# Patient Record
Sex: FEMALE | Race: WHITE | NOT HISPANIC OR LATINO | URBAN - METROPOLITAN AREA
[De-identification: names, ages, dates, MRNs, and addresses within clinical notes are randomized per-mention and may not be internally consistent; named-entity substitution may affect disease eponyms.]

---

## 2020-11-07 ENCOUNTER — INPATIENT (INPATIENT)
Facility: HOSPITAL | Age: 55
LOS: 3 days | Discharge: HOME HEALTH | DRG: 981 | End: 2020-11-11
Attending: STUDENT IN AN ORGANIZED HEALTH CARE EDUCATION/TRAINING PROGRAM | Admitting: INTERNAL MEDICINE
Payer: MEDICARE

## 2020-11-07 VITALS
SYSTOLIC BLOOD PRESSURE: 112 MMHG | DIASTOLIC BLOOD PRESSURE: 83 MMHG | OXYGEN SATURATION: 95 % | TEMPERATURE: 98 F | RESPIRATION RATE: 6 BRPM

## 2020-11-07 LAB
ALBUMIN SERPL ELPH-MCNC: 2.3 G/DL — LOW (ref 3.3–5)
ALBUMIN SERPL ELPH-MCNC: 3 G/DL — LOW (ref 3.3–5)
ALP SERPL-CCNC: 71 U/L — SIGNIFICANT CHANGE UP (ref 40–120)
ALP SERPL-CCNC: 81 U/L — SIGNIFICANT CHANGE UP (ref 40–120)
ALT FLD-CCNC: 10 U/L — SIGNIFICANT CHANGE UP (ref 10–45)
ALT FLD-CCNC: 9 U/L — LOW (ref 10–45)
ANION GAP SERPL CALC-SCNC: 14 MMOL/L — SIGNIFICANT CHANGE UP (ref 5–17)
ANION GAP SERPL CALC-SCNC: 16 MMOL/L — SIGNIFICANT CHANGE UP (ref 5–17)
APPEARANCE UR: CLEAR — SIGNIFICANT CHANGE UP
APTT BLD: 31 SEC — SIGNIFICANT CHANGE UP (ref 27.5–35.5)
AST SERPL-CCNC: 19 U/L — SIGNIFICANT CHANGE UP (ref 10–40)
AST SERPL-CCNC: 19 U/L — SIGNIFICANT CHANGE UP (ref 10–40)
BASE EXCESS BLDA CALC-SCNC: -0.4 MMOL/L — SIGNIFICANT CHANGE UP (ref -2–3)
BASE EXCESS BLDV CALC-SCNC: 4.8 MMOL/L — SIGNIFICANT CHANGE UP
BASOPHILS # BLD AUTO: 0.01 K/UL — SIGNIFICANT CHANGE UP (ref 0–0.2)
BASOPHILS NFR BLD AUTO: 0.1 % — SIGNIFICANT CHANGE UP (ref 0–2)
BILIRUB SERPL-MCNC: 0.3 MG/DL — SIGNIFICANT CHANGE UP (ref 0.2–1.2)
BILIRUB SERPL-MCNC: 0.3 MG/DL — SIGNIFICANT CHANGE UP (ref 0.2–1.2)
BILIRUB UR-MCNC: NEGATIVE — SIGNIFICANT CHANGE UP
BUN SERPL-MCNC: 20 MG/DL — SIGNIFICANT CHANGE UP (ref 7–23)
BUN SERPL-MCNC: 20 MG/DL — SIGNIFICANT CHANGE UP (ref 7–23)
CALCIUM SERPL-MCNC: 7 MG/DL — LOW (ref 8.4–10.5)
CALCIUM SERPL-MCNC: 7.9 MG/DL — LOW (ref 8.4–10.5)
CHLORIDE SERPL-SCNC: 101 MMOL/L — SIGNIFICANT CHANGE UP (ref 96–108)
CHLORIDE SERPL-SCNC: 105 MMOL/L — SIGNIFICANT CHANGE UP (ref 96–108)
CO2 SERPL-SCNC: 23 MMOL/L — SIGNIFICANT CHANGE UP (ref 22–31)
CO2 SERPL-SCNC: 26 MMOL/L — SIGNIFICANT CHANGE UP (ref 22–31)
COLOR SPEC: YELLOW — SIGNIFICANT CHANGE UP
CREAT SERPL-MCNC: 0.68 MG/DL — SIGNIFICANT CHANGE UP (ref 0.5–1.3)
CREAT SERPL-MCNC: 0.72 MG/DL — SIGNIFICANT CHANGE UP (ref 0.5–1.3)
CRP SERPL-MCNC: 2.86 MG/DL — HIGH (ref 0–0.4)
D DIMER BLD IA.RAPID-MCNC: 282 NG/ML DDU — HIGH
DIFF PNL FLD: ABNORMAL
EOSINOPHIL # BLD AUTO: 0 K/UL — SIGNIFICANT CHANGE UP (ref 0–0.5)
EOSINOPHIL NFR BLD AUTO: 0 % — SIGNIFICANT CHANGE UP (ref 0–6)
ETHANOL SERPL-MCNC: <10 MG/DL — SIGNIFICANT CHANGE UP (ref 0–10)
FERRITIN SERPL-MCNC: 33 NG/ML — SIGNIFICANT CHANGE UP (ref 15–150)
FIBRINOGEN PPP-MCNC: 353 MG/DL — SIGNIFICANT CHANGE UP (ref 258–438)
GAS PNL BLDA: SIGNIFICANT CHANGE UP
GAS PNL BLDV: SIGNIFICANT CHANGE UP
GLUCOSE SERPL-MCNC: 124 MG/DL — HIGH (ref 70–99)
GLUCOSE SERPL-MCNC: 127 MG/DL — HIGH (ref 70–99)
GLUCOSE UR QL: NEGATIVE — SIGNIFICANT CHANGE UP
HCO3 BLDA-SCNC: 26 MMOL/L — SIGNIFICANT CHANGE UP (ref 21–28)
HCO3 BLDV-SCNC: 30 MMOL/L — HIGH (ref 20–27)
HCT VFR BLD CALC: 44.5 % — SIGNIFICANT CHANGE UP (ref 34.5–45)
HGB BLD-MCNC: 14.3 G/DL — SIGNIFICANT CHANGE UP (ref 11.5–15.5)
IMM GRANULOCYTES NFR BLD AUTO: 0.5 % — SIGNIFICANT CHANGE UP (ref 0–1.5)
INR BLD: 1.24 — HIGH (ref 0.88–1.16)
KETONES UR-MCNC: NEGATIVE — SIGNIFICANT CHANGE UP
LACTATE SERPL-SCNC: 5.3 MMOL/L — CRITICAL HIGH (ref 0.5–2)
LDH SERPL L TO P-CCNC: 287 U/L — HIGH (ref 50–242)
LEUKOCYTE ESTERASE UR-ACNC: ABNORMAL
LYMPHOCYTES # BLD AUTO: 0.4 K/UL — LOW (ref 1–3.3)
LYMPHOCYTES # BLD AUTO: 4.9 % — LOW (ref 13–44)
MCHC RBC-ENTMCNC: 32.1 GM/DL — SIGNIFICANT CHANGE UP (ref 32–36)
MCHC RBC-ENTMCNC: 32.1 PG — SIGNIFICANT CHANGE UP (ref 27–34)
MCV RBC AUTO: 100 FL — SIGNIFICANT CHANGE UP (ref 80–100)
MONOCYTES # BLD AUTO: 0.22 K/UL — SIGNIFICANT CHANGE UP (ref 0–0.9)
MONOCYTES NFR BLD AUTO: 2.7 % — SIGNIFICANT CHANGE UP (ref 2–14)
NEUTROPHILS # BLD AUTO: 7.52 K/UL — HIGH (ref 1.8–7.4)
NEUTROPHILS NFR BLD AUTO: 91.8 % — HIGH (ref 43–77)
NITRITE UR-MCNC: NEGATIVE — SIGNIFICANT CHANGE UP
NRBC # BLD: 0 /100 WBCS — SIGNIFICANT CHANGE UP (ref 0–0)
PCO2 BLDA: 48 MMHG — HIGH (ref 32–45)
PCO2 BLDV: 44 MMHG — SIGNIFICANT CHANGE UP (ref 41–51)
PCP SPEC-MCNC: SIGNIFICANT CHANGE UP
PH BLDA: 7.35 — SIGNIFICANT CHANGE UP (ref 7.35–7.45)
PH BLDV: 7.45 — HIGH (ref 7.32–7.43)
PH UR: 6.5 — SIGNIFICANT CHANGE UP (ref 5–8)
PLATELET # BLD AUTO: 212 K/UL — SIGNIFICANT CHANGE UP (ref 150–400)
PO2 BLDA: 236 MMHG — HIGH (ref 83–108)
PO2 BLDV: 47 MMHG — SIGNIFICANT CHANGE UP
POTASSIUM SERPL-MCNC: 4.2 MMOL/L — SIGNIFICANT CHANGE UP (ref 3.5–5.3)
POTASSIUM SERPL-MCNC: 4.4 MMOL/L — SIGNIFICANT CHANGE UP (ref 3.5–5.3)
POTASSIUM SERPL-SCNC: 4.2 MMOL/L — SIGNIFICANT CHANGE UP (ref 3.5–5.3)
POTASSIUM SERPL-SCNC: 4.4 MMOL/L — SIGNIFICANT CHANGE UP (ref 3.5–5.3)
PROCALCITONIN SERPL-MCNC: 0.16 NG/ML — HIGH (ref 0.02–0.1)
PROT SERPL-MCNC: 5.1 G/DL — LOW (ref 6–8.3)
PROT SERPL-MCNC: 5.7 G/DL — LOW (ref 6–8.3)
PROT UR-MCNC: ABNORMAL MG/DL
PROTHROM AB SERPL-ACNC: 14.7 SEC — HIGH (ref 10.6–13.6)
RBC # BLD: 4.45 M/UL — SIGNIFICANT CHANGE UP (ref 3.8–5.2)
RBC # FLD: 12.8 % — SIGNIFICANT CHANGE UP (ref 10.3–14.5)
SAO2 % BLDA: 100 % — SIGNIFICANT CHANGE UP (ref 95–100)
SAO2 % BLDV: 80 % — SIGNIFICANT CHANGE UP
SODIUM SERPL-SCNC: 140 MMOL/L — SIGNIFICANT CHANGE UP (ref 135–145)
SODIUM SERPL-SCNC: 145 MMOL/L — SIGNIFICANT CHANGE UP (ref 135–145)
SP GR SPEC: 1.01 — SIGNIFICANT CHANGE UP (ref 1–1.03)
TROPONIN T SERPL-MCNC: 0.02 NG/ML — HIGH (ref 0–0.01)
UROBILINOGEN FLD QL: 0.2 E.U./DL — SIGNIFICANT CHANGE UP
WBC # BLD: 8.19 K/UL — SIGNIFICANT CHANGE UP (ref 3.8–10.5)
WBC # FLD AUTO: 8.19 K/UL — SIGNIFICANT CHANGE UP (ref 3.8–10.5)

## 2020-11-07 PROCEDURE — 70450 CT HEAD/BRAIN W/O DYE: CPT | Mod: 26,59

## 2020-11-07 PROCEDURE — 99285 EMERGENCY DEPT VISIT HI MDM: CPT | Mod: CS,25

## 2020-11-07 PROCEDURE — 0042T: CPT

## 2020-11-07 PROCEDURE — 70498 CT ANGIOGRAPHY NECK: CPT | Mod: 26

## 2020-11-07 PROCEDURE — 93010 ELECTROCARDIOGRAM REPORT: CPT | Mod: XU

## 2020-11-07 PROCEDURE — 70496 CT ANGIOGRAPHY HEAD: CPT | Mod: 26

## 2020-11-07 PROCEDURE — 31500 INSERT EMERGENCY AIRWAY: CPT

## 2020-11-07 PROCEDURE — 99291 CRITICAL CARE FIRST HOUR: CPT

## 2020-11-07 PROCEDURE — 71045 X-RAY EXAM CHEST 1 VIEW: CPT | Mod: 26

## 2020-11-07 PROCEDURE — 74177 CT ABD & PELVIS W/CONTRAST: CPT | Mod: 26

## 2020-11-07 RX ORDER — CEFTRIAXONE 500 MG/1
1000 INJECTION, POWDER, FOR SOLUTION INTRAMUSCULAR; INTRAVENOUS ONCE
Refills: 0 | Status: COMPLETED | OUTPATIENT
Start: 2020-11-07 | End: 2020-11-07

## 2020-11-07 RX ORDER — PANTOPRAZOLE SODIUM 20 MG/1
40 TABLET, DELAYED RELEASE ORAL ONCE
Refills: 0 | Status: COMPLETED | OUTPATIENT
Start: 2020-11-07 | End: 2020-11-07

## 2020-11-07 RX ORDER — NALOXONE HYDROCHLORIDE 4 MG/.1ML
0.4 SPRAY NASAL ONCE
Refills: 0 | Status: COMPLETED | OUTPATIENT
Start: 2020-11-07 | End: 2020-11-07

## 2020-11-07 RX ORDER — SUCCINYLCHOLINE CHLORIDE 100 MG/5ML
125 SYRINGE (ML) INTRAVENOUS ONCE
Refills: 0 | Status: COMPLETED | OUTPATIENT
Start: 2020-11-07 | End: 2020-11-07

## 2020-11-07 RX ORDER — SODIUM BICARBONATE 1 MEQ/ML
100 SYRINGE (ML) INTRAVENOUS ONCE
Refills: 0 | Status: COMPLETED | OUTPATIENT
Start: 2020-11-07 | End: 2020-11-07

## 2020-11-07 RX ORDER — PROPOFOL 10 MG/ML
5 INJECTION, EMULSION INTRAVENOUS
Qty: 500 | Refills: 0 | Status: DISCONTINUED | OUTPATIENT
Start: 2020-11-07 | End: 2020-11-08

## 2020-11-07 RX ORDER — ETOMIDATE 2 MG/ML
20 INJECTION INTRAVENOUS ONCE
Refills: 0 | Status: COMPLETED | OUTPATIENT
Start: 2020-11-07 | End: 2020-11-07

## 2020-11-07 RX ORDER — SODIUM CHLORIDE 9 MG/ML
1000 INJECTION INTRAMUSCULAR; INTRAVENOUS; SUBCUTANEOUS
Refills: 0 | Status: DISCONTINUED | OUTPATIENT
Start: 2020-11-07 | End: 2020-11-08

## 2020-11-07 RX ORDER — METRONIDAZOLE 500 MG
500 TABLET ORAL ONCE
Refills: 0 | Status: COMPLETED | OUTPATIENT
Start: 2020-11-07 | End: 2020-11-07

## 2020-11-07 RX ORDER — SODIUM CHLORIDE 9 MG/ML
1700 INJECTION, SOLUTION INTRAVENOUS ONCE
Refills: 0 | Status: DISCONTINUED | OUTPATIENT
Start: 2020-11-07 | End: 2020-11-08

## 2020-11-07 RX ORDER — SODIUM CHLORIDE 9 MG/ML
1000 INJECTION INTRAMUSCULAR; INTRAVENOUS; SUBCUTANEOUS ONCE
Refills: 0 | Status: COMPLETED | OUTPATIENT
Start: 2020-11-07 | End: 2020-11-07

## 2020-11-07 RX ORDER — IPRATROPIUM/ALBUTEROL SULFATE 18-103MCG
3 AEROSOL WITH ADAPTER (GRAM) INHALATION ONCE
Refills: 0 | Status: COMPLETED | OUTPATIENT
Start: 2020-11-07 | End: 2020-11-07

## 2020-11-07 RX ADMIN — Medication 125 MILLIGRAM(S): at 19:12

## 2020-11-07 RX ADMIN — SODIUM CHLORIDE 1000 MILLILITER(S): 9 INJECTION INTRAMUSCULAR; INTRAVENOUS; SUBCUTANEOUS at 21:36

## 2020-11-07 RX ADMIN — Medication 125 MILLIGRAM(S): at 19:15

## 2020-11-07 RX ADMIN — Medication 500 MILLIGRAM(S): at 22:20

## 2020-11-07 RX ADMIN — ETOMIDATE 20 MILLIGRAM(S): 2 INJECTION INTRAVENOUS at 19:12

## 2020-11-07 RX ADMIN — SODIUM CHLORIDE 1000 MILLILITER(S): 9 INJECTION INTRAMUSCULAR; INTRAVENOUS; SUBCUTANEOUS at 21:35

## 2020-11-07 RX ADMIN — Medication 3 MILLILITER(S): at 21:35

## 2020-11-07 RX ADMIN — Medication 100 MILLIGRAM(S): at 20:47

## 2020-11-07 RX ADMIN — SODIUM CHLORIDE 1000 MILLILITER(S): 9 INJECTION INTRAMUSCULAR; INTRAVENOUS; SUBCUTANEOUS at 20:38

## 2020-11-07 RX ADMIN — CEFTRIAXONE 100 MILLIGRAM(S): 500 INJECTION, POWDER, FOR SOLUTION INTRAMUSCULAR; INTRAVENOUS at 20:36

## 2020-11-07 RX ADMIN — SODIUM CHLORIDE 1000 MILLILITER(S): 9 INJECTION INTRAMUSCULAR; INTRAVENOUS; SUBCUTANEOUS at 23:06

## 2020-11-07 RX ADMIN — NALOXONE HYDROCHLORIDE 0.4 MILLIGRAM(S): 4 SPRAY NASAL at 19:09

## 2020-11-07 RX ADMIN — PROPOFOL 2.1 MICROGRAM(S)/KG/MIN: 10 INJECTION, EMULSION INTRAVENOUS at 20:37

## 2020-11-07 RX ADMIN — CEFTRIAXONE 1000 MILLIGRAM(S): 500 INJECTION, POWDER, FOR SOLUTION INTRAMUSCULAR; INTRAVENOUS at 20:48

## 2020-11-07 RX ADMIN — PANTOPRAZOLE SODIUM 40 MILLIGRAM(S): 20 TABLET, DELAYED RELEASE ORAL at 20:47

## 2020-11-07 RX ADMIN — SODIUM CHLORIDE 1000 MILLILITER(S): 9 INJECTION INTRAMUSCULAR; INTRAVENOUS; SUBCUTANEOUS at 22:20

## 2020-11-07 RX ADMIN — Medication 100 MILLIEQUIVALENT(S): at 19:09

## 2020-11-07 RX ADMIN — SODIUM CHLORIDE 1000 MILLILITER(S): 9 INJECTION INTRAMUSCULAR; INTRAVENOUS; SUBCUTANEOUS at 20:37

## 2020-11-07 NOTE — CONSULT NOTE ADULT - SUBJECTIVE AND OBJECTIVE BOX
**STROKE CODE CONSULT NOTE**    Last known well time/Time of onset of symptoms:    HPI: 55y Female with PMHx of       T(C): --  HR: --  BP: 112/83 (11-07-20 @ 19:09) (112/83 - 112/83)  RR: --  SpO2: 95% (11-07-20 @ 19:09) (95% - 95%)    PAST MEDICAL & SURGICAL HISTORY:      FAMILY HISTORY:      SOCIAL HISTORY:    ROS: ***  Constitutional: No fever, weight loss or fatigue  Eyes: No eye pain, visual disturbances, or discharge  ENMT:  No difficulty hearing, tinnitus, vertigo; No sinus or throat pain  Neck: No pain or stiffness  Respiratory: No cough, wheezing, chills or hemoptysis  Cardiovascular: No chest pain, palpitations, shortness of breath, dizziness or leg swelling  Gastrointestinal: No abdominal pain. No nausea, vomiting or hematemesis; No diarrhea or constipation. Nohematochezia.  Genitourinary: No dysuria, frequency, hematuria or incontinence  Neurological: As per HPI  Skin: No itching, burning, rashes or lesions   Endocrine: No heat or cold intolerance; No hair loss  Musculoskeletal: No joint pain or swelling; No muscle, back or extremity pain  Psychiatric: No depression, anxiety, mood swings or difficulty sleeping  Heme/Lymph: No easy bruising or bleeding gums    MEDICATIONS  (STANDING):    MEDICATIONS  (PRN):    Allergies    No Known Allergies    Intolerances      Vital Signs Last 24 Hrs  T(C): --  T(F): --  HR: --  BP: 112/83 (07 Nov 2020 19:09) (112/83 - 112/83)  BP(mean): --  RR: --  SpO2: 95% (07 Nov 2020 19:09) (95% - 95%)    Physical exam:  Constitutional: Respiratory distress  Eyes: Anicteric sclerae, moist conjunctivae, see below for CNs  Cardiovascular: Regular rate and rhythm  Pulmonary: Wheezing, using accessory muscles  GI: Abdomen soft non-distended    Neurologic:  -Mental status: Comatose, no sedation, does not open eyes to voice or noxious stimuli, does not follow commands or track, mute  -Cranial nerves:   II: Blink to threat intact   III, IV, VI: Oculocephalic reflex intact, disconjugate gaze. Pupils equally round and reactive to light 6mm to 3mm  VII: Face appears symmetric, being bagged  IX, X: Cough and gag absent  Motor/Sensation: Absent withdrawal bilateral arms and legs to noxious stimuli.   Reflexes: Mute toes bilaterally       NIHSS: 30    Fingerstick Blood Glucose: CAPILLARY BLOOD GLUCOSE  133 (07 Nov 2020 19:18)      POCT Blood Glucose.: 162 mg/dL (07 Nov 2020 19:04)    LABS:                    RADIOLOGY & ADDITIONAL STUDIES:      -----------------------------------------------------------------------------------------------------------------  IV-tPA (Y/N):    no  Reason IV-tPA not given: out of window   **STROKE CODE CONSULT NOTE**    Last known well time/Time of onset of symptoms: 11/7/2020 unknown    HPI: 55y Female with unclear PMH, bedbound at baseline with home health aide, who presents with AMS, unresponsive. Per EMS patient with home health aide at home, patient is bedbound at baseline and had become more and more unresponsive throughout the day. Aide got concerned and called EMS, patient was found to be unresponsive to noxious. Per EMS they noted patient's pupils were "fixed and dilated." On arrival to ED patient was noted to have a sat of 93% with RR 6. Stroke code called, NIHSS 30. Patient was intubated for airway protection, of note pupils were reactive prior to sedation. HCT showed....       T(C): --  HR: --  BP: 112/83 (11-07-20 @ 19:09) (112/83 - 112/83)  RR: --  SpO2: 95% (11-07-20 @ 19:09) (95% - 95%)    PAST MEDICAL & SURGICAL HISTORY:      FAMILY HISTORY:      SOCIAL HISTORY:    ROS: Unable to obtain    MEDICATIONS  (STANDING):    MEDICATIONS  (PRN):    Allergies    No Known Allergies    Intolerances      Vital Signs Last 24 Hrs  T(C): --  T(F): --  HR: --  BP: 112/83 (07 Nov 2020 19:09) (112/83 - 112/83)  BP(mean): --  RR: --  SpO2: 95% (07 Nov 2020 19:09) (95% - 95%)    Physical exam:  Constitutional: Respiratory distress  Eyes: Anicteric sclerae, moist conjunctivae, see below for CNs  Cardiovascular: Regular rate and rhythm  Pulmonary: Wheezing, using accessory muscles  GI: Abdomen soft non-distended    Neurologic:  -Mental status: Comatose, no sedation, does not open eyes to voice or noxious stimuli, does not follow commands or track, mute  -Cranial nerves:   II: Blink to threat intact   III, IV, VI: Oculocephalic reflex intact, disconjugate gaze. Pupils equally round and reactive to light 6mm to 3mm  VII: Face appears symmetric, being bagged  IX, X: Cough and gag absent  Motor/Sensation: Absent withdrawal bilateral arms and legs to noxious stimuli.   Reflexes: Mute toes bilaterally       NIHSS: 30    Fingerstick Blood Glucose: CAPILLARY BLOOD GLUCOSE  133 (07 Nov 2020 19:18)      POCT Blood Glucose.: 162 mg/dL (07 Nov 2020 19:04)    LABS:                    RADIOLOGY & ADDITIONAL STUDIES:      -----------------------------------------------------------------------------------------------------------------  IV-tPA (Y/N):    no  Reason IV-tPA not given: out of window   **STROKE CODE CONSULT NOTE**    Last known well time/Time of onset of symptoms: 11/7/2020 unknown    HPI: 55y Female with unclear PMH, bedbound at baseline with home health aide, who presents with AMS, unresponsive. Per EMS patient with home health aide at home, patient is bedbound at baseline and had become more and more unresponsive throughout the day. Aide got concerned and called EMS, patient was found to be unresponsive to noxious. Per EMS they noted patient's pupils were "fixed and dilated." On arrival to ED patient was noted to have a sat of 93% with RR 6. Stroke code called, NIHSS 30. Patient was intubated for airway protection, of note pupils were reactive prior to sedation. HCT showed No acute intracranial hemorrhage or cortical infection. Of note, pt's HCT evidencing right hemicraniectomy with multiple areas of chronic appearing gliosis and encephalomalacic change in the right frontal, temporal, parietal, and occipital lobes with ex vacuo dilatation of the right occipital and temporal horns.         T(C): --  HR: --  BP: 112/83 (11-07-20 @ 19:09) (112/83 - 112/83)  RR: --  SpO2: 95% (11-07-20 @ 19:09) (95% - 95%)    PAST MEDICAL & SURGICAL HISTORY:      FAMILY HISTORY:      SOCIAL HISTORY:    ROS: Unable to obtain    MEDICATIONS  (STANDING):    MEDICATIONS  (PRN):    Allergies    No Known Allergies    Intolerances      Vital Signs Last 24 Hrs  T(C): --  T(F): --  HR: --  BP: 112/83 (07 Nov 2020 19:09) (112/83 - 112/83)  BP(mean): --  RR: --  SpO2: 95% (07 Nov 2020 19:09) (95% - 95%)    Physical exam:  Constitutional: Respiratory distress  Eyes: Anicteric sclerae, moist conjunctivae, see below for CNs  Cardiovascular: Regular rate and rhythm  Pulmonary: Wheezing, using accessory muscles  GI: Abdomen soft non-distended    Neurologic:  -Mental status: Comatose, no sedation, does not open eyes to voice or noxious stimuli, does not follow commands or track, mute  -Cranial nerves:   II: Blink to threat intact   III, IV, VI: Oculocephalic reflex intact, disconjugate gaze. Pupils equally round and reactive to light 6mm to 3mm  VII: Face appears symmetric, being bagged  IX, X: Cough and gag absent  Motor/Sensation: Absent withdrawal bilateral arms and legs to noxious stimuli.   Reflexes: Mute toes bilaterally       NIHSS: 30    Fingerstick Blood Glucose: CAPILLARY BLOOD GLUCOSE  133 (07 Nov 2020 19:18)      POCT Blood Glucose.: 162 mg/dL (07 Nov 2020 19:04)    LABS:      RADIOLOGY & ADDITIONAL STUDIES:  < from: CT Brain Stroke Protocol (11.07.20 @ 19:55) >  IMPRESSION:    No acute intracranial hemorrhage or cortical infection.    Status post right hemicraniectomy with multiple areas of chronic appearing gliosis and encephalomalacic change in the right frontal, temporal, parietal, and occipital lobes with ex vacuo dilatation of the right occipital and temporal horns.    < end of copied text >      -----------------------------------------------------------------------------------------------------------------  IV-tPA (Y/N):    no  Reason IV-tPA not given: out of window   **STROKE CODE CONSULT NOTE**    Last known well time/Time of onset of symptoms: 11/7/2020 unknown    HPI: 55y Female with unclear PMH but confirmed MS, bedbound at baseline with home health aide, R hemicrani and gliosis throughout R cerebrum, frequent UTI's who presents with AMS, unresponsive. Per EMS patient with home health aide at home, patient is bedbound at baseline and had become more and more unresponsive throughout the day. Aide got concerned and called EMS, patient was found to be unresponsive to noxious. Per EMS they noted patient's pupils were "fixed and dilated." On arrival to ED patient was noted to have a sat of 93% with RR 6. Stroke code called, NIHSS 30. Patient was intubated for airway protection, of note pupils were reactive prior to sedation. Therefore there was a delay in bringing pt to CT to stabilize ABC's. HCT showed No acute intracranial hemorrhage or cortical infection. Of note, pt's HCT evidencing right hemicraniectomy with multiple areas of chronic appearing gliosis and encephalomalacic change in the right frontal, temporal, parietal, and occipital lobes with ex vacuo dilatation of the right occipital and temporal horns. CTP unremarkable, CTA with no LVO or significant stenosis. ROS limited at pt unresponsive.         T(C): --  HR: --  BP: 112/83 (11-07-20 @ 19:09) (112/83 - 112/83)  RR: --  SpO2: 95% (11-07-20 @ 19:09) (95% - 95%)    PAST MEDICAL & SURGICAL HISTORY:      FAMILY HISTORY:      SOCIAL HISTORY:    ROS: Unable to obtain    MEDICATIONS  (STANDING):    MEDICATIONS  (PRN):    Allergies    No Known Allergies    Intolerances      Vital Signs Last 24 Hrs  T(C): --  T(F): --  HR: --  BP: 112/83 (07 Nov 2020 19:09) (112/83 - 112/83)  BP(mean): --  RR: --  SpO2: 95% (07 Nov 2020 19:09) (95% - 95%)    Physical exam:  Constitutional: Respiratory distress  Eyes: Anicteric sclerae, moist conjunctivae, see below for CNs  Cardiovascular: Regular rate and rhythm  Pulmonary: Wheezing, using accessory muscles  GI: Abdomen soft non-distended    Neurologic:  -Mental status: Comatose, no sedation, does not open eyes to voice or noxious stimuli, does not follow commands or track, mute  -Cranial nerves:   II: Blink to threat intact   III, IV, VI: Oculocephalic reflex intact, disconjugate gaze. Pupils equally round and reactive to light 6mm to 3mm  VII: Face appears symmetric, being bagged  IX, X: Cough and gag absent  Motor/Sensation: Absent withdrawal bilateral arms and legs to noxious stimuli.   Reflexes: Mute toes bilaterally       NIHSS: 30    Fingerstick Blood Glucose: CAPILLARY BLOOD GLUCOSE  133 (07 Nov 2020 19:18)      POCT Blood Glucose.: 162 mg/dL (07 Nov 2020 19:04)    LABS:      RADIOLOGY & ADDITIONAL STUDIES:  < from: CT Brain Stroke Protocol (11.07.20 @ 19:55) >  IMPRESSION:    No acute intracranial hemorrhage or cortical infection.    Status post right hemicraniectomy with multiple areas of chronic appearing gliosis and encephalomalacic change in the right frontal, temporal, parietal, and occipital lobes with ex vacuo dilatation of the right occipital and temporal horns.    < end of copied text >      < from: CT Angio Head w/ IV Cont (11.07.20 @ 19:58) >  IMPRESSION: Unremarkable CTA examination of the brain.    < end of copied text >      < from: CT Angio Head w/ IV Cont (11.07.20 @ 19:58) >  IMPRESSION:    No high-grade stenosis, occlusion, or dissection of the cervical carotid or vertebral arteries.    Endotracheal tube noted in the right mainstem bronchus.    Dilated, fluid-filled esophagus in the chest in addition to a small amount of fluid seen in the posterior oropharynx.    < end of copied text >  -----------------------------------------------------------------------------------------------------------------  IV-tPA (Y/N):    no  Reason IV-tPA not given: out of window    Assessment/Plan:   55y Female with unclear PMH but confirmed MS, bedbound at baseline with home health aide, R hemicrani and gliosis throughout R cerebrum, frequent UTI's who presents with AMS, unresponsive. intubated in ED for O2 sat of 93% with RR 6. Stroke code called, NIHSS 30 given unresponsiveness. HCT showed No acute intracranial hemorrhage or cortical infection. Of note, pt's HCT evidencing right hemicraniectomy with multiple areas of chronic appearing gliosis and encephalomalacic change in the right frontal, temporal, parietal, and occipital lobes with ex vacuo dilatation of the right occipital and temporal horns. CTP unremarkable, CTA with no LVO or significant stenosis. Low suspicion for acute stroke. Concern for underlying toxic metabolic infectious encephalopathy, r/o covid, UTI. Care per ED.

## 2020-11-07 NOTE — ED ADULT NURSE NOTE - OBJECTIVE STATEMENT
Pt presents biba, found unresponsive in bed by night aid who reports previous aid stated she 'hasn't been herself all day." Pt presents with dilated non reactive pupils, breathing 6x/min with minimal chest rise, unresponsive, pale cool skin, edema to bilateral legs extending from ankles to hips. Per aid pt has hx of MS, is bedbound at baseline.   Pt [id number] to MD gibbs, BVM initiated. Following bicarb and narcan admin pt's color became flushed, respiratory effort improved slightly, pt still unresponsive with weak thready pulse. Lung sounds wheezy, pt given solumedrol 125 @ 1912. EKG performed at 1915, signed by sai. Neuro status evaluated by stroke provider prior to giving RSI meds.  At 1919 pt intubated using 20mg etomiate followed by 125 mg succinocholine. 7.5 tube 27@ the lip. Lung sounds equal bilaterally, chest rise visible, O2% 100%. Pt brought to CT head with respiratory and RN for monitoring.

## 2020-11-07 NOTE — CONSULT NOTE ADULT - ATTENDING COMMENTS
55 F quadriplegic with MS, hx of ICH, seizure in setting of THC use, brought into ED after being found unresponsive, lip-fluttering, found to be bradypneic in ED, febrile, no acuity on CTH/CTA/CTP, and admitted to MICU for acute respiratory failure in setting of non-convulsive seizure    Acute Respiratory Failure  Toxic/metabolic encephalopathy  Seizure  lactic acidosis  SBO  UTI    Neuro: vEEG, keppra 750 q12, wean propofol gtt once connected to EEG  CV: MAP>65, lactate cleared  Pulm: lung-protective ventilation, HOB>30, aspiration precautions, pulmonary toilet, CXR reviewed  GI: high-grade SBO, sump tube for decompression- monitor NG output, lactate cleared, IVF, non-op at this point, PPI  Renal: euvolemia, IVF with plasma-lyte, optimize electrolytes  ID: low-grade fever in ED, wbc likely from solu-medrol in ED, UA+, rocephin 1g daily   Endo: ISS, FSBS 140-180   Heme: stable cbc/coags, lovenox daily    Access/catheter: PIVs

## 2020-11-07 NOTE — CONSULT NOTE ADULT - ASSESSMENT
Ms. Reilly is a 54 yo F with PMH of MS who presents with coma, CT without new acute findings but notable for significant gliosis and encephalomalacia; given stated history of seizure in setting of cannabis use and otherwise isolated lactic acidosis clinical suspicion for seizure is high. UTI noted may have precipitated seizure.         Ms. Reilly is a 56 yo F with PMH of MS who presents with coma, CT without new acute findings but notable for significant gliosis and encephalomalacia; given stated history of seizure in setting of cannabis use and otherwise isolated lactic acidosis clinical suspicion for seizure is high. UTI noted may have precipitated seizure.    CARDIOVASCULAR  # HTN  - holding antihypertensives    PULMONARY  - intubated for airway protection    GASTROINTESTINAL  # pSBO  - has had BM x2 in ED, will hold off on NGT to suction for now    RENAL  - no evidence of MELL    Neurology  # C    INFECTIOUS DISEASE  #  ENDOCRINE  #  HEME  #  FLUIDS/ELECTROLYTES/NUTRITION  -IVF  -Monitor, Replete to K>4 and Mg>2  -Diet  PROPHYLAXIS  -DVT  -GI    DISPO  CODE STATUS   Ms. Reilly is a 56 yo F with PMH of MS who presents with coma, CT without new acute findings but notable for significant gliosis and encephalomalacia; given stated history of seizure in setting of cannabis use and otherwise isolated lactic acidosis clinical suspicion for seizure is high. UTI noted may have precipitated seizure.    CARDIOVASCULAR  # HTN  - holding antihypertensives    PULMONARY  - intubated for airway protection    GASTROINTESTINAL  # SBO  - has had BM x2 in ED, will hold off on NGT to suction for now after multiple attempts to place, per gen surg her lap band may be cause of repeat kinking of NGT/OGT (see CXR in ED after ED provider attempted, CXR after MICU resident attempted, both similar placement, could not aspirate gastric contents)    RENAL  - no evidence of MELL, trend BMP    Neurology  # Coma (GCS 4, grimace to sternal rub)  - concern for seizure  - propofol for sedation   - s/p Keppra load with 1 g, then started 750 mg q12h   - vEEG ordered, tech to come in AM    INFECTIOUS DISEASE  # UTI   In spite of lactic acidosis only meets 1/4 SIRS (low-grade fever) so not consistent with sepsis. Note that leukocytosis only occurred due to demargination of PMNs after patient received an amp of Solu-medrol in ED, not secondary to infection.  - CTX 1 g q24h (11/7 - )     ENDOCRINE  - LDSSI     FLUIDS/ELECTROLYTES/NUTRITION  -IVF: s/p 3L NS in ED  -Monitor, Replete to K>4 and Mg>2  -Diet: NPO  PROPHYLAXIS  -DVT: Lovenox  -GI: PPI    DISPO: MICU  CODE STATUS: FULL CODE   Ms. Reilly is a 56 yo F with PMH of MS who presents with coma, CT without new acute findings but notable for significant gliosis and encephalomalacia; given stated history of seizure in setting of cannabis use and otherwise isolated lactic acidosis clinical suspicion for seizure is high. UTI noted may have precipitated seizure.    CARDIOVASCULAR  # HTN  - holding antihypertensives    PULMONARY  - intubated for airway protection    GASTROINTESTINAL  # SBO  - has had BM x2 in ED, will hold off on NGT to suction for now after multiple attempts to place, per gen surg her lap band may be cause of repeat kinking of NGT/OGT (see CXR in ED after ED provider attempted, CXR after MICU resident attempted, both similar placement, could not aspirate gastric contents)    RENAL  - no evidence of MELL, trend BMP    Neurology  # Coma (GCS 4, grimace to sternal rub)  - concern for seizure  - propofol for sedation   - s/p Keppra load with 1 g, then started 750 mg q12h   - vEEG ordered, tech to come in AM    # Hx ICH     INFECTIOUS DISEASE  # UTI   In spite of lactic acidosis only meets 1/4 SIRS (low-grade fever) so not consistent with sepsis. Note that leukocytosis only occurred due to demargination of PMNs after patient received an amp of Solu-medrol in ED, not secondary to infection.  - CTX 1 g q24h (11/7 - )     ENDOCRINE  - LDSSI     HEME   # Chronic anticoagulation  - holding home Eliquis, unclear indication; obtain collateral in AM    FLUIDS/ELECTROLYTES/NUTRITION  -IVF: s/p 3L NS in ED  -Monitor, Replete to K>4 and Mg>2  -Diet: NPO  PROPHYLAXIS  -DVT: Lovenox  -GI: PPI    DISPO: MICU  CODE STATUS: FULL CODE

## 2020-11-07 NOTE — ED PROVIDER NOTE - ENMT, MLM
Airway patent, Nasal mucosa clear. Mouth with normal mucosa. Airway patent, Nasal mucosa clear. Mouth with normal mucosa. Throat has no vesicles, no oropharyngeal exudates and uvula is midline.

## 2020-11-07 NOTE — CONSULT NOTE ADULT - SUBJECTIVE AND OBJECTIVE BOX
CONSULT NOTE: CRITICAL CARE MEDICINE    HPI: Ms. Reilly is a 53 yo F with PMH of MS (dx age 35, bedbound, AOx3), intracranial bleed many years ago, HTN, presents after being found poorly responsive but breathing in her bed at home. She presents with her night HHA who has known her for 5 years. She says she arrived at 18:00 to begin her shift and the day HHA, who is new and does not know the patient very well, told her the patient had been "sleeping" for most of the day. The night HHA tried to rouse patient including applying ice to her face and when she did not rouse she called 911. 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 HCP is her stepbrother Fransico Mckay [phone].    ED COURSE: Patient BIBEMS and had to be intubated on arrival as she was breathing at approx 5/min and not protecting airway. NIHSS 30 on arrival, no new CVA or ICH on CTH/CTA/CTP.    ROS: Patient is unable to participate due to clinical condition.      PAST MEDICAL & SURGICAL HISTORY:  MS (multiple sclerosis) with bedbound status due to weakness  Intracranial hemorrhage  No significant past surgical history    Social History:  Non-smoker  No EtOH use  Uses MJ recreationally  No other illicit drug use    FAMILY HISTORY:  No significant family history    THIS IS TEMPLATED LANGUGE OF GENERIC PHYSICAL EXAM. DOCUMENT TO BE UPDATED WITH PATIENT'S ACTUAL PHYSICAL EXAM FINDINGS.    VITAL SIGNS:  T(C): 36.6 (20 @ 19:09), Max: 36.6 (20 @ 19:09)  T(F): 97.9 (20 @ 19:09), Max: 97.9 (20 @ 19:09)  HR: 78 (20 @ 21:08) (78 - 92)  BP: 126/65 (20 @ 21:08) (112/55 - 186/81)  BP(mean): --  RR: 12 (20 @ 21:08) (6 - 16)  SpO2: 100% (20 @ 21:08) (95% - 100%)  Wt(kg): --    PHYSICAL EXAM:    Constitutional: Adult White female, intubated, sedated with propofol  Head: NC/AT  Eyes: PERRL  Neck: no JVD  Respiratory: breath sounds present bilaterally  Cardiac: RRR  Gastrointestinal: obese, moderately distended but not taught, no appreciable rebound tenderness or guarding but sedated  Extremities: cool  Vascular: palpable peripheral pulses  Dermatologic: skin warm, dry and intact  Neurologic: intubated, sedated, my exam overall is consistent with exam reported in stroke neuro NP note     LABS:         140  |  101  |  20  ----------------------------<  124<H>  4.4   |  23  |  0.68    Ca    7.0<L>      2020 20:21    TPro  5.1<L>  /  Alb  2.3<L>  /  TBili  0.3  /  DBili  x   /  AST  19  /  ALT  9<L>  /  AlkPhos  71        Urinalysis Basic - ( 2020 21:39 )    Color: Yellow / Appearance: Clear / S.015 / pH: x  Gluc: x / Ketone: NEGATIVE  / Bili: Negative / Urobili: 0.2 E.U./dL   Blood: x / Protein: Trace mg/dL / Nitrite: NEGATIVE   Leuk Esterase: Moderate / RBC: x / WBC x   Sq Epi: x / Non Sq Epi: x / Bacteria: x          Serum Pro-Brain Natriuretic Peptide: 326 pg/mL ( @ 20:21)    Lactate, Blood: 7.0 mmol/L ( @ 20:21)      RADIOLOGY, EKG & ADDITIONAL TESTS: Reviewed. CONSULT NOTE: CRITICAL CARE MEDICINE    HPI: Ms. Reilly is a 54 yo F with PMH of MS (dx age 35, bedbound, AOx3), intracranial bleed many years ago, HTN, presents after being found poorly responsive but breathing in her bed at home. She presents with her night HHA who has known her for 5 years. She says she arrived at 18:00 to begin her shift and the day HHA, who is new and does not know the patient very well, told her the patient had been "sleeping" for most of the day. The night HHA tried to rouse patient including applying ice to her face and when she did not rouse she called 911. 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 HCP is her stepbrother Fransico Mckay [phone].    ED COURSE: Patient BIBEMS and had to be intubated on arrival as she was breathing at approx 5/min and not protecting airway. NIHSS 30 on arrival, no new CVA or ICH on CTH/CTA/CTP. Labs notable for lactate 7.0.    ROS: Patient is unable to participate due to clinical condition.      PAST MEDICAL & SURGICAL HISTORY:  MS (multiple sclerosis) with bedbound status due to weakness  Intracranial hemorrhage  No significant past surgical history    Social History:  Non-smoker  No EtOH use  Uses MJ recreationally  No other illicit drug use    FAMILY HISTORY:  No significant family history    VITAL SIGNS:  T(C): 36.6 (20 @ 19:09), Max: 36.6 (20 @ 19:09)  T(F): 97.9 (20 @ 19:09), Max: 97.9 (20 @ 19:09)  HR: 78 (20 @ 21:08) (78 - 92)  BP: 126/65 (20 @ 21:08) (112/55 - 186/81)  BP(mean): --  RR: 12 (20 @ 21:08) (6 - 16)  SpO2: 100% (20 21:08) (95% - 100%)  Wt(kg): --    PHYSICAL EXAM:    Constitutional: Adult White female, intubated, sedated with propofol  Head: NC/AT  Eyes: PERRL  Neck: no JVD  Respiratory: breath sounds present bilaterally  Cardiac: RRR  Gastrointestinal: obese, moderately distended but not taught, no appreciable rebound tenderness or guarding but sedated  Extremities: cool  Vascular: palpable peripheral pulses  Dermatologic: skin warm, dry and intact  Neurologic: intubated, sedated, my exam overall is consistent with exam reported in stroke neuro NP note     LABS:         140  |  101  |  20  ----------------------------<  124<H>  4.4   |  23  |  0.68    Ca    7.0<L>      2020 20:21    TPro  5.1<L>  /  Alb  2.3<L>  /  TBili  0.3  /  DBili  x   /  AST  19  /  ALT  9<L>  /  AlkPhos  71        Urinalysis Basic - ( 2020 21:39 )    Color: Yellow / Appearance: Clear / S.015 / pH: x  Gluc: x / Ketone: NEGATIVE  / Bili: Negative / Urobili: 0.2 E.U./dL   Blood: x / Protein: Trace mg/dL / Nitrite: NEGATIVE   Leuk Esterase: Moderate / RBC: x / WBC x   Sq Epi: x / Non Sq Epi: x / Bacteria: x          Serum Pro-Brain Natriuretic Peptide: 326 pg/mL ( @ 20:21)    Lactate, Blood: 7.0 mmol/L ( @ 20:21)      RADIOLOGY, EKG & ADDITIONAL TESTS: Reviewed. CONSULT NOTE: CRITICAL CARE MEDICINE    HPI: Ms. Reilly is a 54 yo F with PMH of MS (dx age 35, bedbound, AOx3), intracranial bleed many years ago, HTN, presents after being found poorly responsive but breathing in her bed at home. She presents with her night HHA who has known her for 5 years. She says she arrived at 18:00 to begin her shift and the day HHA, who is new and does not know the patient very well, told her the patient had been "sleeping" for most of the day. The night HHA tried to rouse patient including applying ice to her face and when she did not rouse she called 911. 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 HCP is her stepbrother Fransico Mckay [phone].    ED COURSE: Patient BIBEMS and had to be intubated on arrival as she was breathing at approx 5/min and not protecting airway. NIHSS 30 on arrival, no new CVA or ICH on CTH/CTA/CTP. Labs notable for lactate 7.0.    ROS: Patient is unable to participate due to clinical condition.      PAST MEDICAL & SURGICAL HISTORY:  MS (multiple sclerosis) with bedbound status due to weakness  Intracranial hemorrhage  No significant past surgical history    Social History:  Non-smoker  No EtOH use  Uses MJ recreationally, vape pen   No other illicit drug use    FAMILY HISTORY:  No significant family history    VITAL SIGNS:  T(C): 36.6 (20 @ 19:09), Max: 36.6 (20 @ 19:09)  T(F): 97.9 (20 @ 19:09), Max: 97.9 (20 @ 19:09)  HR: 78 (20 21:08) (78 - 92)  BP: 126/65 (20 @ 21:08) (112/55 - 186/81)  BP(mean): --  RR: 12 (20 @ 21:08) (6 - 16)  SpO2: 100% (20 21:08) (95% - 100%)  Wt(kg): --    PHYSICAL EXAM:    Constitutional: Adult White female, intubated, sedated with propofol  Head: NC/AT  Eyes: PERRL  Neck: no JVD  Respiratory: breath sounds present bilaterally  Cardiac: RRR  Gastrointestinal: obese, moderately distended but not taught, no appreciable rebound tenderness or guarding but sedated  Extremities: cool  Vascular: palpable peripheral pulses  Dermatologic: skin warm, dry and intact  Neurologic: intubated, sedated, my exam overall is consistent with exam reported in stroke neuro NP note     LABS:         140  |  101  |  20  ----------------------------<  124<H>  4.4   |  23  |  0.68    Ca    7.0<L>      2020 20:21    TPro  5.1<L>  /  Alb  2.3<L>  /  TBili  0.3  /  DBili  x   /  AST  19  /  ALT  9<L>  /  AlkPhos  71        Urinalysis Basic - ( 2020 21:39 )    Color: Yellow / Appearance: Clear / S.015 / pH: x  Gluc: x / Ketone: NEGATIVE  / Bili: Negative / Urobili: 0.2 E.U./dL   Blood: x / Protein: Trace mg/dL / Nitrite: NEGATIVE   Leuk Esterase: Moderate / RBC: x / WBC x   Sq Epi: x / Non Sq Epi: x / Bacteria: x          Serum Pro-Brain Natriuretic Peptide: 326 pg/mL ( @ 20:21)    Lactate, Blood: 7.0 mmol/L ( @ 20:21)      RADIOLOGY, EKG & ADDITIONAL TESTS: Reviewed. CONSULT NOTE: CRITICAL CARE MEDICINE    HPI: Ms. Reilly is a 54 yo F with PMH of MS (dx age 35, bedbound, AOx3), intracranial bleed many years ago, HTN, presents after being found poorly responsive but breathing in her bed at home. She presents with her night HHA who has known her for 5 years. She says she arrived at 18:00 to begin her shift and the day HHA, who is new and does not know the patient very well, told her the patient had been "sleeping" for most of the day. The night HHA tried to rouse patient including applying ice to her face and when she did not rouse she called 911. 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 HCP is her stepbrother Fransico Mckay [phone].    ED COURSE: Patient BIBEMS and had to be intubated on arrival as she was breathing at approx 5/min and not protecting airway. NIHSS 30 on arrival, no new CVA or ICH on CTH/CTA/CTP. Labs notable for lactate 7.0.    ROS: Patient is unable to participate due to clinical condition.      PAST MEDICAL & SURGICAL HISTORY:  MS (multiple sclerosis) with bedbound status due to weakness  Intracranial hemorrhage  No significant past surgical history    Social History:  Non-smoker  No EtOH use  Uses MJ recreationally, vape pen   No other illicit drug use    FAMILY HISTORY:  No significant family history    VITAL SIGNS:  T(C): 36.6 (20 @ 19:09), Max: 36.6 (20 @ 19:09)  T(F): 97.9 (20 @ 19:09), Max: 97.9 (20 @ 19:09)  HR: 78 (20 21:08) (78 - 92)  BP: 126/65 (20 @ 21:08) (112/55 - 186/81)  BP(mean): --  RR: 12 (20 @ 21:08) (6 - 16)  SpO2: 100% (20 21:08) (95% - 100%)  Wt(kg): --    PHYSICAL EXAM:    Constitutional: Adult White female, intubated, sedated with propofol  Head: NC/AT  Eyes: PERRL, dysconjugate, +doll's  Neck: no JVD  Respiratory: breath sounds present bilaterally  Cardiac: RRR  Gastrointestinal: obese, moderately distended but not taught, no appreciable rebound tenderness or guarding but sedated  Extremities: cool  Vascular: palpable peripheral pulses  Dermatologic: skin warm, dry and intact  Neurologic: intubated, sedated, grimaces to deep noxious, no eye-opening, no cough/gag, +corneal, quadriplegic/flaccid extremities    LABS:         140  |  101  |  20  ----------------------------<  124<H>  4.4   |  23  |  0.68    Ca    7.0<L>      2020 20:21    TPro  5.1<L>  /  Alb  2.3<L>  /  TBili  0.3  /  DBili  x   /  AST  19  /  ALT  9<L>  /  AlkPhos  71        Urinalysis Basic - ( 2020 21:39 )    Color: Yellow / Appearance: Clear / S.015 / pH: x  Gluc: x / Ketone: NEGATIVE  / Bili: Negative / Urobili: 0.2 E.U./dL   Blood: x / Protein: Trace mg/dL / Nitrite: NEGATIVE   Leuk Esterase: Moderate / RBC: x / WBC x   Sq Epi: x / Non Sq Epi: x / Bacteria: x          Serum Pro-Brain Natriuretic Peptide: 326 pg/mL ( @ 20:21)    Lactate, Blood: 7.0 mmol/L ( @ 20:21)      RADIOLOGY, EKG & ADDITIONAL TESTS: Reviewed. CONSULT NOTE: CRITICAL CARE MEDICINE    HPI: Ms. Reilly is a 56 yo F with PMH of MS (dx age 35, bedbound, AOx3), intracranial bleed many years ago, HTN, presents after being found poorly responsive but breathing in her bed at home. She presents with her night HHA who has known her for 5 years. She says she arrived at 18:00 to begin her shift and the day HHA, who is new and does not know the patient very well, told her the patient had been "sleeping" for most of the day. The night HHA tried to rouse patient including applying ice to her face and when she did not rouse she called 911. 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 HCP is her stepbrother Fransico Mckay [phone].    ED COURSE: Patient BIBEMS and had to be intubated on arrival as she was breathing at approx 5/min and not protecting airway. ED Vitals 97.9 (rectal), 92, 112/83, 6, 95% on 4L. NIHSS 30 on arrival, no new CVA or ICH on CTH/CTA/CTP. VBG initially pH 7.45 but repeat 7.26, pCO2 71, HCO3 32. Lactate 7.0. UA grossly positive for UTI. CT abd with high grade SBO and dilated loops of bowel. Received succinylcholine, etomidate, HCO3 x2 amps, CTX/Flagyl, NS 3 L, Narcan.     ROS: Patient is unable to participate due to clinical condition.      PAST MEDICAL & SURGICAL HISTORY:  MS (multiple sclerosis) with bedbound status due to weakness  Intracranial hemorrhage  No significant past surgical history    Social History:  Non-smoker  No EtOH use  Uses MJ recreationally, vape pen   No other illicit drug use    FAMILY HISTORY:  No significant family history    VITAL SIGNS:  T(C): 36.6 (20 @ 19:09), Max: 36.6 (20 @ 19:09)  T(F): 97.9 (20 @ 19:09), Max: 97.9 (20 @ 19:09)  HR: 78 (20 @ 21:08) (78 - 92)  BP: 126/65 (20 @ 21:08) (112/55 - 186/81)  BP(mean): --  RR: 12 (20 @ 21:08) (6 - 16)  SpO2: 100% (20 @ 21:08) (95% - 100%)  Wt(kg): --    PHYSICAL EXAM:    Constitutional: Adult White female, intubated, sedated with propofol  Head: NC/AT  Eyes: PERRL, dysconjugate, +doll's  Neck: no JVD  Respiratory: breath sounds present bilaterally  Cardiac: RRR  Gastrointestinal: obese, moderately distended but not taught, no appreciable rebound tenderness or guarding but sedated  Extremities: cool  Vascular: palpable peripheral pulses  Dermatologic: skin warm, dry and intact  Neurologic: intubated, sedated, grimaces to deep noxious, no eye-opening, no cough/gag, +corneal, quadriplegic/flaccid extremities    LABS:         140  |  101  |  20  ----------------------------<  124<H>  4.4   |  23  |  0.68    Ca    7.0<L>      2020 20:21    TPro  5.1<L>  /  Alb  2.3<L>  /  TBili  0.3  /  DBili  x   /  AST  19  /  ALT  9<L>  /  AlkPhos  71        Urinalysis Basic - ( 2020 21:39 )    Color: Yellow / Appearance: Clear / S.015 / pH: x  Gluc: x / Ketone: NEGATIVE  / Bili: Negative / Urobili: 0.2 E.U./dL   Blood: x / Protein: Trace mg/dL / Nitrite: NEGATIVE   Leuk Esterase: Moderate / RBC: x / WBC x   Sq Epi: x / Non Sq Epi: x / Bacteria: x          Serum Pro-Brain Natriuretic Peptide: 326 pg/mL ( @ 20:21)    Lactate, Blood: 7.0 mmol/L ( @ 20:21)      RADIOLOGY, EKG & ADDITIONAL TESTS: Reviewed. CONSULT NOTE: CRITICAL CARE MEDICINE    HPI: Ms. Reilly is a 56 yo F with PMH of MS (dx age 35, bedbound, AOx3), intracranial bleed many years ago, HTN, presents after being found poorly responsive but breathing in her bed at home. She presents with her night HHA who has known her for 5 years. She says she arrived at 18:00 to begin her shift and the day HHA, who is new and does not know the patient very well, told her the patient had been "sleeping" for most of the day. The night HHA tried to rouse patient including applying ice to her face and when she did not rouse she called 911. 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 HCP is her stepbrother Fransico Mckay [phone].    ED COURSE: Patient BIBEMS and had to be intubated on arrival as she was breathing at approx 5/min and not protecting airway. ED Vitals 97.9 (rectal), 92, 112/83, 6, 95% on 4L. NIHSS 30 on arrival, no new CVA or ICH on CTH/CTA/CTP. VBG initially pH 7.45 but repeat 7.26, pCO2 71, HCO3 32. Lactate 7.0. UA grossly positive for UTI. CT abd with high grade SBO and dilated loops of bowel. Received succinylcholine, etomidate, HCO3 x2 amps, CTX/Flagyl, NS 3 L, Narcan.     ROS: Patient is unable to participate due to clinical condition.      PAST MEDICAL & SURGICAL HISTORY:  MS (multiple sclerosis) with bedbound status due to weakness  Intracranial hemorrhage  Status post lap band    Home Medications:  acyclovir:  (2020 04:58)  baclofen:  (2020 04:58)  buPROPion:  (2020 04:58)  clonazePAM:  (2020 04:58)  Eliquis 2.5 mg oral tablet: 1 tab(s) orally 2 times a day (2020 04:58)  escitalopram:  (2020 04:58)  famotidine:  (2020 04:58)  Florastor:  (2020 04:58)  oxybutynin:  (2020 04:58)  pregabalin:  (2020 04:58)  Probiotic Formula:  (2020 04:58)  tamsulosin:  (2020 04:58)  traZODone:  (2020 04:58)  verapamil:  (2020 04:58)    Allergies:  No Known Allergies    Social History:  Non-smoker  No EtOH use  Uses MJ recreationally, vape pen   No other illicit drug use    FAMILY HISTORY:  No significant family history    VITAL SIGNS:  T(C): 36.6 (20 @ 19:09), Max: 36.6 (20 @ 19:09)  T(F): 97.9 (20 @ 19:09), Max: 97.9 (20 @ 19:09)  HR: 78 (20 @ 21:08) (78 - 92)  BP: 126/65 (20 @ 21:08) (112/55 - 186/81)  BP(mean): --  RR: 12 (20 @ 21:08) (6 - 16)  SpO2: 100% (20 @ 21:08) (95% - 100%)  Wt(kg): --    PHYSICAL EXAM:    Constitutional: Adult White female, intubated, sedated with propofol  Head: NC/AT  Eyes: PERRL, dysconjugate, +doll's  Neck: no JVD  Respiratory: breath sounds present bilaterally  Cardiac: RRR  Gastrointestinal: obese, moderately distended but not taught, no appreciable rebound tenderness or guarding but sedated  Extremities: cool  Vascular: palpable peripheral pulses  Dermatologic: skin warm, dry and intact  Neurologic: intubated, sedated, grimaces to deep noxious, no eye-opening, no cough/gag, +corneal, quadriplegic/flaccid extremities    LABS:         140  |  101  |  20  ----------------------------<  124<H>  4.4   |  23  |  0.68    Ca    7.0<L>      2020 20:21    TPro  5.1<L>  /  Alb  2.3<L>  /  TBili  0.3  /  DBili  x   /  AST  19  /  ALT  9<L>  /  AlkPhos  71  11-07      Urinalysis Basic - ( 2020 21:39 )    Color: Yellow / Appearance: Clear / S.015 / pH: x  Gluc: x / Ketone: NEGATIVE  / Bili: Negative / Urobili: 0.2 E.U./dL   Blood: x / Protein: Trace mg/dL / Nitrite: NEGATIVE   Leuk Esterase: Moderate / RBC: x / WBC x   Sq Epi: x / Non Sq Epi: x / Bacteria: x          Serum Pro-Brain Natriuretic Peptide: 326 pg/mL ( @ 20:21)    Lactate, Blood: 7.0 mmol/L ( @ 20:21)      RADIOLOGY, EKG & ADDITIONAL TESTS: Reviewed.

## 2020-11-07 NOTE — ED PROVIDER NOTE - NEUROLOGICAL, MLM
No spontaneous movement of extremities. no spontaneous movement of extremities, moving mouth and pupils reactive

## 2020-11-07 NOTE — ED PROVIDER NOTE - CONSTITUTIONAL, MLM
normal... Unresponsive, hypoventilating, shallow respirations with RR of 6/min. unresponsive, hypoventilating, no spontaneous extremity movement,

## 2020-11-07 NOTE — ED ADULT TRIAGE NOTE - BP NONINVASIVE DIASTOLIC (MM HG)
"Hospital Medicine Daily Progress Note    Date of Service  10/27/2020    Chief Complaint  49 y.o. male admitted 9/9/2020 with altered mental status    Hospital Course    49M PMH hypertension, chronic pain, alcohol use disorder presented 09/09/2020 for altered mental status.  Patient was found by ex-wife obtunded and unclear as to reason.  Patient came in with GCS of 7 had to be intubated brought to the ICU.  EEG was performed did not show any seizure-like activity.  Patient was extubated 09/11/2020.  Patient was treated with benzodiazepines and IV thiamine for possible EtOH intoxication.  Patient has been impulsive while in the hospital while on the medical floor, he has required restraints intermittently.  He was started on Risperdal.  Patient was treated with Bactrim for UTI.      Interval Problem Update  I have seen and examined this patient this morning. Complaining of anxiety, stated he used to take \"Xanax 0.5mg\" twice a day.  Believes he had his marriage today at a [employer].  Believes it is May 2000. Unable to accurately state president of [place].    As per nursing, no acute events overnight    Care plan discussed with patient in detail.  Care team notified of plan as well.    Consultants/Specialty  Critical care, psychiatry    Code Status  Full Code    Disposition  Pending guardianship, patient has no insurance which is a barrier.    Review of Systems  Review of Systems   Unable to perform ROS: Mental acuity        Physical Exam  Temp:  [36.5 °C (97.7 °F)-37.1 °C (98.7 °F)] 36.5 °C (97.7 °F)  Pulse:  [82-94] 94  Resp:  [16-18] 18  BP: (125-146)/(75-85) 140/83  SpO2:  [95 %-97 %] 95 %    Physical Exam  Vitals signs and nursing note reviewed.   Constitutional:       General: He is not in acute distress.     Appearance: Normal appearance. He is not diaphoretic.      Comments: In vest and B/L wrist restraints   HENT:      Head: Normocephalic and atraumatic.   Cardiovascular:      Rate and Rhythm: Normal rate and " regular rhythm.      Pulses: Normal pulses.      Heart sounds: Normal heart sounds. No murmur. No friction rub. No gallop.    Pulmonary:      Effort: Pulmonary effort is normal. No respiratory distress.      Breath sounds: Normal breath sounds. No wheezing.   Abdominal:      General: Abdomen is flat. There is no distension.      Palpations: Abdomen is soft.      Tenderness: There is no abdominal tenderness.   Musculoskeletal: Normal range of motion.         General: No swelling, tenderness or deformity.   Skin:     General: Skin is warm.      Capillary Refill: Capillary refill takes less than 2 seconds.      Coloration: Skin is not jaundiced or pale.      Findings: No rash.   Neurological:      General: No focal deficit present.      Mental Status: He is alert. Mental status is at baseline.      Comments: Believes it is May 2000   Psychiatric:         Mood and Affect: Mood normal.         Behavior: Behavior normal.         Fluids    Intake/Output Summary (Last 24 hours) at 10/27/2020 2021  Last data filed at 10/27/2020 1330  Gross per 24 hour   Intake 358 ml   Output 800 ml   Net -442 ml       Laboratory                        Imaging  MR-BRAIN-W/O   Final Result      1.  Moderate diffuse cerebral atrophy.      2.  Minimal periventricular and juxtacortical white matter changes consistent with chronic microvascular ischemic gliosis.      3.  No evidence of acute infarction in the brain parenchyma.      DX-ABDOMEN FOR TUBE PLACEMENT   Final Result      Feeding tube tip overlies the second portion of the duodenum.      DX-CHEST-PORTABLE (1 VIEW)   Final Result         1.  Patchy left lung base opacity, new since prior, could represent early infiltrate            DX-CHEST-PORTABLE (1 VIEW)   Final Result         1.  Patchy left lung base opacity, new since prior, could represent early infiltrate         EA-TOJDNYK-3 VIEW   Final Result      Enteric tube projects over the distal stomach/pylorus.         EC-ECHOCARDIOGRAM  COMPLETE W/O CONT   Final Result      DX-CHEST-PORTABLE (1 VIEW)   Final Result         1.  No acute cardiopulmonary disease.      CT-ABDOMEN-PELVIS W/O   Final Result      1.  No renal stone or hydronephrosis.   2.  Normal appendix.   3.  No focal mesenteric inflammatory process.      DX-ABDOMEN FOR TUBE PLACEMENT   Final Result      NG tube tip projects over expected location the gastric fundus.      US-ABDOMEN COMPLETE SURVEY   Final Result         1.  Echogenic liver compatible with fatty change versus fibrosis.   2.  Gallbladder sludge without additional sonographic findings of cholecystitis.   3.  Partial atrophic bilateral kidneys with lobulated contour         CT-HEAD W/O   Final Result         1.  No acute intracranial abnormality is identified, there are nonspecific white matter changes, commonly associated with small vessel ischemic disease.  Associated mild cerebral atrophy is noted.   2.  Atherosclerosis.      DX-CHEST-PORTABLE (1 VIEW)   Final Result         1.  No acute cardiopulmonary disease.           Assessment/Plan  * Toxic encephalopathy- (present on admission)  Assessment & Plan  Unknown etiology whether this is hypoxic induced brain injury versus Wernicke encephalopathy  LP- negative  MRI brain negative  EEG was negative for seizures  TSH normal  HIV/RPR negative  - Psych md consult--> they believe it is etoh related however at some point may need to get them back involved  - Prn haldol  - Po risperdal 1mg BID  - Continue restraints as needed for patient safety    Normocytic anemia  Assessment & Plan  Mild, unknown etiology  No active bleeding  CTM    Urinary retention- (present on admission)  Assessment & Plan  Improved w/ Flomax, continue medication    Alcohol abuse- (present on admission)  Assessment & Plan  Reported hx of abuse. S/p etoh w/d protocol.  - Continue Vitamins, thiamine  - High dose thiamine  - Seizure precautions    JEWELS (acute kidney injury) (HCC)- (present on  admission)  Assessment & Plan  Consistent with ATN 2/2 hypotension, dehydration and sepsis  Avoid nephrotoxins.  Renal dose all medications.  Currently problem is resolved    Hypokalemia- (present on admission)  Assessment & Plan  Replace as needed, monitor BMP intermittently     VTE prophylaxis: Lovenox       83

## 2020-11-07 NOTE — ED PROVIDER NOTE - MUSCULOSKELETAL, MLM
Spine appears normal, range of motion is not limited, no muscle or joint tenderness. Pulses palpable x4, trace edema to b/l LE no spontaneous movement, trace lower ext edema, all pulses palable,

## 2020-11-07 NOTE — ED PROVIDER NOTE - PROGRESS NOTE DETAILS
ET tube noted deep on XR, withdrawn by 2cm from 25-23 ET tube noted deep on XR, withdrawn by 2cm from 25-23, ICU consulted

## 2020-11-07 NOTE — ED PROVIDER NOTE - PSYCHIATRIC, MLM
Alert and oriented to person, place, time/situation. normal mood and affect. no apparent risk to self or others. unable to assess, comatose

## 2020-11-07 NOTE — ED PROVIDER NOTE - CLINICAL SUMMARY MEDICAL DECISION MAKING FREE TEXT BOX
56 y/o F with unclear PMHx, unable to obtain further hx, attempted calling contact # in chart. Patient is unresponsive and hypoventilating on arrival, no spontaneous movement and dilated pupils. Consider ICH. Code called, consider cardiac event, EKG with TWI in V1 V2, no STEMI. consider respiratory failure, possible CO2 retention, consider metabolic abnormalities, consider tox, consider COVID. Thorough workup ordered, stroke code called on arrival, given Narcan with minimal response. Bicarb given due to prolonged downtime, hypoventilation, high suspicion that patient is acidotic. Intubated for airway protection. Discussed case with tita, pts  (# [phone]) who saw her yesterday, as per  patient frequently has UTIs, also with hx of coma in the past, 8yo ago after marijuana overdose.  notes patient just received vape pen with marijuana yesterday, unclear if used, no other drugs found. Patient does not administer her own medications as per aide who is now here. Morning and Noon meds noted to be taken out of blister pack for medications, however aide states she was not given these meds as she was unresponsive and sleeping all day. 54 y/o F with unclear PMHx, unable to obtain further hx, attempted calling contact # in chart. Patient is unresponsive and hypoventilating on arrival, no spontaneous movement and dilated pupils. Consider ICH. Code called, consider cardiac event, EKG with TWI in V1 V2, no STEMI. consider respiratory failure, possible CO2 retention, consider metabolic abnormalities,consider seizure,  consider tox, consider COVID. Thorough workup ordered, stroke code called on arrival, given Narcan with minimal response. Bicarb given due to prolonged downtime, hypoventilation, high suspicion that patient is acidotic. Intubated for airway protection. Discussed case with tita, pts  (# [phone]) who saw her yesterday, as per  patient frequently has UTIs, also with hx of coma in the past, 6yo ago after marijuana overdose.  notes patient just received vape pen with marijuana yesterday, unclear if used, no other drugs found. Patient does not administer her own medications as per aide who is now here. Morning and Noon meds noted to be taken out of blister pack for medications, however aide states she was not given these meds as she was unresponsive and sleeping all day. 56 y/o F with unclear PMHx, unable to obtain further hx, attempted calling contact # in chart. Patient is unresponsive and hypoventilating on arrival, no spontaneous movement and dilated pupils. Consider ICH. Code called, consider cardiac event, EKG with TWI in V1 V2, no STEMI. consider respiratory failure, possible CO2 retention, consider metabolic abnormalities, consider seizure,  consider tox, consider COVID. Thorough workup ordered, stroke code called on arrival, given Narcan with minimal response. Bicarb given due to prolonged downtime, hypoventilation, high suspicion that patient is acidotic. Intubated for airway protection. Discussed case with tita, pts  (# [phone]) who saw her yesterday, as per  patient frequently has UTIs, also with hx of coma in the past, 6yo ago after marijuana overdose.  notes patient just received vape pen with marijuana yesterday, unclear if used, no other drugs found. Patient does not administer her own medications as per aide who is now here. Morning and Noon meds noted to be taken out of blister pack for medications, however aide states she was not given these meds as she was unresponsive and sleeping all day.

## 2020-11-07 NOTE — ED PROVIDER NOTE - SKIN, MLM
Skin normal color for race, warm, dry and intact. No evidence of rash. Skin normal color for race, warm, dry and intact. No evidence of rash. slightly flushed appearance

## 2020-11-07 NOTE — ED PROVIDER NOTE - DIAGNOSTIC INTERPRETATION
CXR read: ET tube in place, however far, will retract 2cm. The heart appears to be within normal limits in transverse diameter.  No acute infiltrates, effusions or evidence of pulmonary vascular congestion.  The chest wall and surrounding bony structures appear normal.  ED Physician: Maria Eugenia Calvillo)

## 2020-11-08 DIAGNOSIS — Z98.890 OTHER SPECIFIED POSTPROCEDURAL STATES: Chronic | ICD-10-CM

## 2020-11-08 LAB
ANION GAP SERPL CALC-SCNC: 13 MMOL/L — SIGNIFICANT CHANGE UP (ref 5–17)
APTT BLD: 33.3 SEC — SIGNIFICANT CHANGE UP (ref 27.5–35.5)
BASE EXCESS BLDA CALC-SCNC: 3.7 MMOL/L — HIGH (ref -2–3)
BASE EXCESS BLDA CALC-SCNC: 5.2 MMOL/L — HIGH (ref -2–3)
BASOPHILS # BLD AUTO: 0.02 K/UL — SIGNIFICANT CHANGE UP (ref 0–0.2)
BASOPHILS NFR BLD AUTO: 0.2 % — SIGNIFICANT CHANGE UP (ref 0–2)
BUN SERPL-MCNC: 18 MG/DL — SIGNIFICANT CHANGE UP (ref 7–23)
CALCIUM SERPL-MCNC: 8.1 MG/DL — LOW (ref 8.4–10.5)
CHLORIDE SERPL-SCNC: 106 MMOL/L — SIGNIFICANT CHANGE UP (ref 96–108)
CO2 SERPL-SCNC: 26 MMOL/L — SIGNIFICANT CHANGE UP (ref 22–31)
CREAT SERPL-MCNC: 0.64 MG/DL — SIGNIFICANT CHANGE UP (ref 0.5–1.3)
EOSINOPHIL # BLD AUTO: 0 K/UL — SIGNIFICANT CHANGE UP (ref 0–0.5)
EOSINOPHIL NFR BLD AUTO: 0 % — SIGNIFICANT CHANGE UP (ref 0–6)
GAS PNL BLDA: SIGNIFICANT CHANGE UP
GAS PNL BLDA: SIGNIFICANT CHANGE UP
GLUCOSE SERPL-MCNC: 137 MG/DL — HIGH (ref 70–99)
GRAM STN FLD: SIGNIFICANT CHANGE UP
HCO3 BLDA-SCNC: 27 MMOL/L — SIGNIFICANT CHANGE UP (ref 21–28)
HCO3 BLDA-SCNC: 27 MMOL/L — SIGNIFICANT CHANGE UP (ref 21–28)
HCT VFR BLD CALC: 44.6 % — SIGNIFICANT CHANGE UP (ref 34.5–45)
HGB BLD-MCNC: 14.8 G/DL — SIGNIFICANT CHANGE UP (ref 11.5–15.5)
IMM GRANULOCYTES NFR BLD AUTO: 0.3 % — SIGNIFICANT CHANGE UP (ref 0–1.5)
INR BLD: 1.29 — HIGH (ref 0.88–1.16)
LACTATE SERPL-SCNC: 2 MMOL/L — SIGNIFICANT CHANGE UP (ref 0.5–2)
LYMPHOCYTES # BLD AUTO: 0.52 K/UL — LOW (ref 1–3.3)
LYMPHOCYTES # BLD AUTO: 3.9 % — LOW (ref 13–44)
MCHC RBC-ENTMCNC: 32 PG — SIGNIFICANT CHANGE UP (ref 27–34)
MCHC RBC-ENTMCNC: 33.2 GM/DL — SIGNIFICANT CHANGE UP (ref 32–36)
MCV RBC AUTO: 96.5 FL — SIGNIFICANT CHANGE UP (ref 80–100)
MONOCYTES # BLD AUTO: 0.7 K/UL — SIGNIFICANT CHANGE UP (ref 0–0.9)
MONOCYTES NFR BLD AUTO: 5.3 % — SIGNIFICANT CHANGE UP (ref 2–14)
NEUTROPHILS # BLD AUTO: 11.92 K/UL — HIGH (ref 1.8–7.4)
NEUTROPHILS NFR BLD AUTO: 90.3 % — HIGH (ref 43–77)
NRBC # BLD: 0 /100 WBCS — SIGNIFICANT CHANGE UP (ref 0–0)
PCO2 BLDA: 30 MMHG — LOW (ref 32–45)
PCO2 BLDA: 38 MMHG — SIGNIFICANT CHANGE UP (ref 32–45)
PH BLDA: 7.48 — HIGH (ref 7.35–7.45)
PH BLDA: 7.56 — HIGH (ref 7.35–7.45)
PLATELET # BLD AUTO: 228 K/UL — SIGNIFICANT CHANGE UP (ref 150–400)
PO2 BLDA: 74 MMHG — LOW (ref 83–108)
PO2 BLDA: 87 MMHG — SIGNIFICANT CHANGE UP (ref 83–108)
POTASSIUM SERPL-MCNC: 3.7 MMOL/L — SIGNIFICANT CHANGE UP (ref 3.5–5.3)
POTASSIUM SERPL-SCNC: 3.7 MMOL/L — SIGNIFICANT CHANGE UP (ref 3.5–5.3)
PROTHROM AB SERPL-ACNC: 15.3 SEC — HIGH (ref 10.6–13.6)
RBC # BLD: 4.62 M/UL — SIGNIFICANT CHANGE UP (ref 3.8–5.2)
RBC # FLD: 12.9 % — SIGNIFICANT CHANGE UP (ref 10.3–14.5)
SAO2 % BLDA: 96 % — SIGNIFICANT CHANGE UP (ref 95–100)
SAO2 % BLDA: 97 % — SIGNIFICANT CHANGE UP (ref 95–100)
SODIUM SERPL-SCNC: 145 MMOL/L — SIGNIFICANT CHANGE UP (ref 135–145)
SPECIMEN SOURCE: SIGNIFICANT CHANGE UP
WBC # BLD: 13.2 K/UL — HIGH (ref 3.8–10.5)
WBC # FLD AUTO: 13.2 K/UL — HIGH (ref 3.8–10.5)

## 2020-11-08 PROCEDURE — 99291 CRITICAL CARE FIRST HOUR: CPT

## 2020-11-08 PROCEDURE — 99222 1ST HOSP IP/OBS MODERATE 55: CPT

## 2020-11-08 PROCEDURE — 71045 X-RAY EXAM CHEST 1 VIEW: CPT | Mod: 26,76

## 2020-11-08 PROCEDURE — 71045 X-RAY EXAM CHEST 1 VIEW: CPT | Mod: 26,77

## 2020-11-08 RX ORDER — PANTOPRAZOLE SODIUM 20 MG/1
40 TABLET, DELAYED RELEASE ORAL EVERY 24 HOURS
Refills: 0 | Status: DISCONTINUED | OUTPATIENT
Start: 2020-11-08 | End: 2020-11-09

## 2020-11-08 RX ORDER — PROPOFOL 10 MG/ML
5 INJECTION, EMULSION INTRAVENOUS
Qty: 1000 | Refills: 0 | Status: DISCONTINUED | OUTPATIENT
Start: 2020-11-08 | End: 2020-11-08

## 2020-11-08 RX ORDER — CEFTRIAXONE 500 MG/1
1000 INJECTION, POWDER, FOR SOLUTION INTRAMUSCULAR; INTRAVENOUS ONCE
Refills: 0 | Status: COMPLETED | OUTPATIENT
Start: 2020-11-08 | End: 2020-11-08

## 2020-11-08 RX ORDER — LEVETIRACETAM 250 MG/1
1000 TABLET, FILM COATED ORAL ONCE
Refills: 0 | Status: COMPLETED | OUTPATIENT
Start: 2020-11-08 | End: 2020-11-08

## 2020-11-08 RX ORDER — CHLORHEXIDINE GLUCONATE 213 G/1000ML
15 SOLUTION TOPICAL EVERY 12 HOURS
Refills: 0 | Status: DISCONTINUED | OUTPATIENT
Start: 2020-11-08 | End: 2020-11-09

## 2020-11-08 RX ORDER — CEFTRIAXONE 500 MG/1
1000 INJECTION, POWDER, FOR SOLUTION INTRAMUSCULAR; INTRAVENOUS EVERY 24 HOURS
Refills: 0 | Status: DISCONTINUED | OUTPATIENT
Start: 2020-11-08 | End: 2020-11-08

## 2020-11-08 RX ORDER — SODIUM CHLORIDE 9 MG/ML
1000 INJECTION, SOLUTION INTRAVENOUS
Refills: 0 | Status: DISCONTINUED | OUTPATIENT
Start: 2020-11-08 | End: 2020-11-08

## 2020-11-08 RX ORDER — LEVETIRACETAM 250 MG/1
750 TABLET, FILM COATED ORAL EVERY 12 HOURS
Refills: 0 | Status: DISCONTINUED | OUTPATIENT
Start: 2020-11-08 | End: 2020-11-08

## 2020-11-08 RX ORDER — CHLORHEXIDINE GLUCONATE 213 G/1000ML
1 SOLUTION TOPICAL
Refills: 0 | Status: DISCONTINUED | OUTPATIENT
Start: 2020-11-08 | End: 2020-11-11

## 2020-11-08 RX ORDER — CEFTRIAXONE 500 MG/1
2000 INJECTION, POWDER, FOR SOLUTION INTRAMUSCULAR; INTRAVENOUS EVERY 24 HOURS
Refills: 0 | Status: DISCONTINUED | OUTPATIENT
Start: 2020-11-08 | End: 2020-11-11

## 2020-11-08 RX ORDER — INSULIN LISPRO 100/ML
VIAL (ML) SUBCUTANEOUS EVERY 6 HOURS
Refills: 0 | Status: DISCONTINUED | OUTPATIENT
Start: 2020-11-08 | End: 2020-11-11

## 2020-11-08 RX ORDER — ENOXAPARIN SODIUM 100 MG/ML
40 INJECTION SUBCUTANEOUS EVERY 24 HOURS
Refills: 0 | Status: DISCONTINUED | OUTPATIENT
Start: 2020-11-08 | End: 2020-11-09

## 2020-11-08 RX ADMIN — PROPOFOL 2.1 MICROGRAM(S)/KG/MIN: 10 INJECTION, EMULSION INTRAVENOUS at 08:08

## 2020-11-08 RX ADMIN — CHLORHEXIDINE GLUCONATE 15 MILLILITER(S): 213 SOLUTION TOPICAL at 06:52

## 2020-11-08 RX ADMIN — ENOXAPARIN SODIUM 40 MILLIGRAM(S): 100 INJECTION SUBCUTANEOUS at 06:51

## 2020-11-08 RX ADMIN — PANTOPRAZOLE SODIUM 40 MILLIGRAM(S): 20 TABLET, DELAYED RELEASE ORAL at 06:52

## 2020-11-08 RX ADMIN — CHLORHEXIDINE GLUCONATE 1 APPLICATION(S): 213 SOLUTION TOPICAL at 06:52

## 2020-11-08 RX ADMIN — SODIUM CHLORIDE 120 MILLILITER(S): 9 INJECTION, SOLUTION INTRAVENOUS at 02:52

## 2020-11-08 RX ADMIN — LEVETIRACETAM 400 MILLIGRAM(S): 250 TABLET, FILM COATED ORAL at 02:45

## 2020-11-08 RX ADMIN — CEFTRIAXONE 100 MILLIGRAM(S): 500 INJECTION, POWDER, FOR SOLUTION INTRAMUSCULAR; INTRAVENOUS at 11:47

## 2020-11-08 RX ADMIN — CEFTRIAXONE 100 MILLIGRAM(S): 500 INJECTION, POWDER, FOR SOLUTION INTRAMUSCULAR; INTRAVENOUS at 19:24

## 2020-11-08 RX ADMIN — CHLORHEXIDINE GLUCONATE 15 MILLILITER(S): 213 SOLUTION TOPICAL at 17:49

## 2020-11-08 NOTE — CONSULT NOTE ADULT - SUBJECTIVE AND OBJECTIVE BOX
HPI:  56 yo F with PMH of MS (dx age 35, bedbound, AOx3), intracranial bleed many years ago, HTN, presents after being found poorly responsive but breathing in her bed at home. She presents with her night HHA who has known her for 5 years. She says she arrived at 18:00 to begin her shift and the day HHA, who is new and does not know the patient very well, told her the patient had been "sleeping" for most of the day. The night HHA tried to rouse patient including applying ice to her face and when she did not rouse called 911. 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 HCP is her stepbrother Fransico Mckay [phone].    ED COURSE: Patient BIBEMS and had to be intubated on arrival as she was breathing at approx 5/min and not protecting airway. ED Vitals 97.9 (rectal), 92, 112/83, 6, 95% on 4L. NIHSS 30 on arrival, no new CVA or ICH on CTH/CTA/CTP. Seen by stroke team, deemed no stroke.    Admitted to ICU. Extubated today.    ROS:  unable    PMHX:  RESPIRATORY FAILURE    No pertinent family history in first degree relatives    Handoff    MEWS Score    ICH (intracerebral hemorrhage)    MS (multiple sclerosis)    No pertinent past medical history    Respiratory failure    Status post craniectomy    No significant past surgical history    No significant past surgical history    AMS    90+    [email or web address]        MEDS:  cefTRIAXone   IVPB 2000 milliGRAM(s) IV Intermittent every 24 hours  chlorhexidine 0.12% Liquid 15 milliLiter(s) Oral Mucosa every 12 hours  chlorhexidine 2% Cloths 1 Application(s) Topical <User Schedule>  enoxaparin Injectable 40 milliGRAM(s) SubCutaneous every 24 hours  insulin lispro (ADMELOG) corrective regimen sliding scale   SubCutaneous every 6 hours  pantoprazole  Injectable 40 milliGRAM(s) IV Push every 24 hours      SHX: nc    No Known Allergies      FHX: nc    Vitals:  T(C): 37.5 (11-08-20 @ 13:13), Max: 38 (11-08-20 @ 00:25)  HR: 83 (11-08-20 @ 14:00) (61 - 98)  BP: 119/67 (11-08-20 @ 14:00) (94/53 - 186/81)  RR: 13 (11-08-20 @ 14:00) (6 - 17)  SpO2: 97% (11-08-20 @ 14:00) (95% - 100%)    Exam:  somewhat sleepy with nonrebreather on.  opens eyes and attends to examiner. oriented X3  some perseveration with regards to language suggestive of some mild dysphasia - pt reports she chronically has word production difficulties  face symmetric  moves eyes well but difficult to assess if full movements.  moves both arms antigrav with good strength. can wiggle toes and flap feet well but cannot lift at hips - pt reports this is chronic.  some spontaneous ankle clonus    Studies:    CTH, CTA H+N - no acute changes    AP: 56 yo F with PMH of MS (dx age 35, bedbound, AOx3), intracranial bleed many years ago, HTN, presents after being found poorly responsive but breathing in her bed at home. She presents with her night HHA who has known her for 5 years. She says she arrived at 18:00 to begin her shift and the day HHA, who is new and does not know the patient very well, told her the patient had been "sleeping" for most of the day. The night HHA tried to rouse patient including applying ice to her face and when she did not rouse called 911. 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 HCP is her stepbrother Fransico Mckay [phone]. ED COURSE: Patient BIBEMS and had to be intubated on arrival as she was breathing at approx 5/min and not protecting airway. ED Vitals 97.9 (rectal), 92, 112/83, 6, 95% on 4L. NIHSS 30 on arrival, no new CVA or ICH on CTH/CTA/CTP. Seen by stroke team, deemed no stroke. Admitted to ICU. Extubated today.    Improving obtundation, likely toxic metabolic related to either UTI or meds/drug. Speech dysfunction could be related to overall global encephalopathy affecting someone with imperfect neurological baseline. F/u EEG. Will follow.

## 2020-11-08 NOTE — H&P ADULT - ATTENDING COMMENTS
Through the day we were able to have her wake up and extubate her. Surgery has consulted and do not believe there is a clinical SBO; her lap band was deflated but NGT could not pass past upper stomach; nonetheless she is stable and will have swallow evaluation tomorrow. Continue EEG off keppra with resolving toxic encephalopathy.  Critical care time rendered 50 minutes

## 2020-11-08 NOTE — H&P ADULT - NSHPSOCIALHISTORY_GEN_ALL_CORE
Lives at home with 24h HHA  Non-smoker  No EtOH use  Uses MJ recreationally, vape pen   No other illicit drug use

## 2020-11-08 NOTE — H&P ADULT - NSHPLABSRESULTS_GEN_ALL_CORE
.  LABS:                         14.8   13.20 )-----------( 228      ( 2020 02:26 )             44.6         145  |  106  |  18  ----------------------------<  137<H>  3.7   |  26  |  0.64    Ca    8.1<L>      2020 02:26    TPro  5.7<L>  /  Alb  3.0<L>  /  TBili  0.3  /  DBili  x   /  AST  19  /  ALT  10  /  AlkPhos  81  11-07    PT/INR - ( 2020 02:26 )   PT: 15.3 sec;   INR: 1.29          PTT - ( 2020 02:26 )  PTT:33.3 sec  Urinalysis Basic - ( 2020 21:39 )    Color: Yellow / Appearance: Clear / S.015 / pH: x  Gluc: x / Ketone: NEGATIVE  / Bili: Negative / Urobili: 0.2 E.U./dL   Blood: x / Protein: Trace mg/dL / Nitrite: NEGATIVE   Leuk Esterase: Moderate / RBC: < 5 /HPF / WBC Many /HPF   Sq Epi: x / Non Sq Epi: x / Bacteria: Many /HPF      CARDIAC MARKERS ( 2020 23:01 )  x     / 0.02 ng/mL / x     / x     / x          Serum Pro-Brain Natriuretic Peptide: 326 pg/mL ( @ 20:21)    Lactate, Blood: 2.0 mmol/L ( @ 02:26)  Lactate, Blood: 5.3 mmol/L ( @ 22:55)  Lactate, Blood: 7.0 mmol/L ( @ 20:21)      RADIOLOGY, EKG & ADDITIONAL TESTS: Reviewed.       CT abd/pelvis 20:  IMPRESSION:  1. Diffuse thickening of wall of the rectum, without associated perirectal fat stranding, most  compatible with proctitis, less likely neoplasm.  2. The ascending, transverse, descending, and most of the sigmoid colon are mildly to moderately  distended with very low-attenuation stool suggesting steatorrhea which implies malabsorption.. There  is an abrupt short-segment narrowing between the stool distended colon and the inflamed rectum  which may be incidental peristalsis but could represent an obstruction secondary to wall thickening of  the rectum or less likely adhesion.  3. High-grade distal small-bowel obstruction with transition point in the right lower quadrant,  presumably secondary to adhesion.  4. Gastric banding device present. Thickening of the wall of the distal esophagus compatible with  esophagitis. Mild distention of the lumen with gas and fluid suggests gastroesophageal reflux.  5. Incompletely visualized lobar atelectasis of the left lower lobe. Concomitant pneumonia/aspiration  not excluded.

## 2020-11-08 NOTE — PROGRESS NOTE ADULT - SUBJECTIVE AND OBJECTIVE BOX
SUBJECTIVE: Surgery consulted for high grade SBO on CT. Pt seen and examined at bedside this am by surgery team. Does not appear to be in distress. Having adequate bowel function.    MEDICATIONS  (STANDING):  cefTRIAXone   IVPB 2000 milliGRAM(s) IV Intermittent every 24 hours  chlorhexidine 0.12% Liquid 15 milliLiter(s) Oral Mucosa every 12 hours  chlorhexidine 2% Cloths 1 Application(s) Topical <User Schedule>  enoxaparin Injectable 40 milliGRAM(s) SubCutaneous every 24 hours  insulin lispro (ADMELOG) corrective regimen sliding scale   SubCutaneous every 6 hours  pantoprazole  Injectable 40 milliGRAM(s) IV Push every 24 hours    MEDICATIONS  (PRN):    Vital Signs Last 24 Hrs  T(C): 37.5 (2020 13:13), Max: 38 (2020 00:25)  T(F): 99.5 (2020 13:13), Max: 100.4 (2020 00:25)  HR: 83 (2020 14:00) (61 - 98)  BP: 119/67 (2020 14:00) (94/53 - 186/81)  BP(mean): 86 (2020 14:00) (68 - 102)  RR: 13 (2020 14:00) (6 - 17)  SpO2: 97% (2020 14:00) (95% - 100%)    PHYSICAL EXAM:    Constitutional: Intubated and sedated    Respiratory: non labored breathing, no respiratory distress    Cardiovascular: NSR, RRR    Gastrointestinal: abdomen soft, distended, nt. well healed old lap scars, Band SQ port can be palpated around the epigastric area    Extremities: wwp, (-) edema      I&O's Detail    2020 07:01  -  2020 07:00  --------------------------------------------------------  IN:    IV PiggyBack: 100 mL    multiple electrolytes Injection Type 1.: 240 mL    Propofol: 45 mL    Propofol: 30 mL    Propofol: 45 mL  Total IN: 460 mL    OUT:    Indwelling Catheter - Urethral (mL): 1560 mL  Total OUT: 1560 mL    Total NET: -1100 mL      2020 07:01  -  2020 14:24  --------------------------------------------------------  IN:    IV PiggyBack: 50 mL    Propofol: 25.5 mL  Total IN: 75.5 mL    OUT:    Indwelling Catheter - Urethral (mL): 195 mL  Total OUT: 195 mL    Total NET: -119.5 mL          LABS:                        14.8   13.20 )-----------( 228      ( 2020 02:26 )             44.6     11-08    145  |  106  |  18  ----------------------------<  137<H>  3.7   |  26  |  0.64    Ca    8.1<L>      2020 02:26    TPro  5.7<L>  /  Alb  3.0<L>  /  TBili  0.3  /  DBili  x   /  AST  19  /  ALT  10  /  AlkPhos  81  11-07    PT/INR - ( 2020 02:26 )   PT: 15.3 sec;   INR: 1.29          PTT - ( 2020 02:26 )  PTT:33.3 sec  Urinalysis Basic - ( 2020 21:39 )    Color: Yellow / Appearance: Clear / S.015 / pH: x  Gluc: x / Ketone: NEGATIVE  / Bili: Negative / Urobili: 0.2 E.U./dL   Blood: x / Protein: Trace mg/dL / Nitrite: NEGATIVE   Leuk Esterase: Moderate / RBC: < 5 /HPF / WBC Many /HPF   Sq Epi: x / Non Sq Epi: x / Bacteria: Many /HPF        RADIOLOGY & ADDITIONAL STUDIES:

## 2020-11-08 NOTE — CONSULT NOTE ADULT - ASSESSMENT
56 yo F with PMH of MS (dx age 35, bedbound, AOx3), intracranial bleed many years ago, HTN, gastric band, presents after being found poorly responsive but breathing in her bed at home. Pt was intubated on arrival for airway protection. UA grossly positive for UTI. CT abd with small bowel obstruction with transition point distal ileum, likely due to adhesions and Long segment of rectal wall thickening. Team had difficulty placing the NGT for decompression with looping in the esophagus. GI was consulted for help with possible EGD guided NGT (Sump) placement.     # SBO  likely partial given BM yday and gas seen in the colon.   Seen by Dr Ascencio (bariatric sx) who deflated the gastric band reattempted the NGT placement. The last CXR showed NGT traversing below the diaphragm however no NGT aspirated on suction  no significant abdominal distension on exam  - now extubated and saturating well  - monitor clinically for passage of flatus and BMs  - once more stable and able to take prep will plan for colonoscopic evaluation next week    Recommendations discussed with primary team  Plan discussed with GI service attending    Brian Ocampo MD  PGY-4 GI fellow  Pager: [phone]

## 2020-11-08 NOTE — H&P ADULT - NSHPPHYSICALEXAM_GEN_ALL_CORE
ICU Vital Signs Last 24 Hrs  T(C): 38 (08 Nov 2020 00:25), Max: 38 (08 Nov 2020 00:25)  T(F): 100.4 (08 Nov 2020 00:25), Max: 100.4 (08 Nov 2020 00:25)  HR: 80 (08 Nov 2020 04:10) (76 - 98)  BP: 106/62 (08 Nov 2020 04:00) (99/58 - 186/81)  BP(mean): 79 (08 Nov 2020 04:00) (73 - 91)  ABP: --  ABP(mean): --  RR: 17 (08 Nov 2020 04:10) (6 - 17)  SpO2: 97% (08 Nov 2020 04:10) (95% - 100%)    PHYSICAL EXAM:    Constitutional: Adult White female, intubated, sedated with propofol  Head: NC/AT  Eyes: PERRL  Neck: no JVD  Respiratory: breath sounds present bilaterally  Cardiac: RRR  Gastrointestinal: obese, moderately distended but not taught, no appreciable rebound tenderness or guarding but sedated  Extremities: cool  Vascular: palpable peripheral pulses  Dermatologic: skin warm, dry and intact  Neurologic: intubated, sedated, my exam overall is consistent with exam reported in stroke neuro NP note

## 2020-11-08 NOTE — PROGRESS NOTE ADULT - ASSESSMENT
54 y/o F with history of MS  was found unresponsive at home, intubated on arrival;, code stroke, CT now signs of ICH.  Lactate was 7 improved to 2. CT scan showed high grade SBO with transition point in RLQ.  Patient is having BMS.    - 8cc fluid removed from band by Dr. Ascencio today   - Keep NPO  - Recommend NGT   - No acute surgical intervention at this time   - Continue serial abdominal exams   - Trend labs  - Surgery team 2 will continue to follow     d/w attending Dr. Ascencio

## 2020-11-08 NOTE — CONSULT NOTE ADULT - ASSESSMENT
This is a 56 y/o F with history of MS  was found unresponsive at home, intubated on arrival;, code stroke, CT now signs of ICH.  Lactate was 7 improved to 2. CT scan showed high grade SBO with transition point in RLQ.  Patient is having BMS.    Gastric distension ?Band is too tight? having difficulty getting the NGT into the stomach after multiple trials by ED, MICU and surgery teams    Wbc 14, afebrile, UTI on Rocephin     Discussed with chief resident and attending:    - Keep NPO  - NGT is needed  - Serial abdominal exams  - trend labs  - Surgery team 2 will continue to FU This is a 54 y/o F with history of MS  was found unresponsive at home, intubated on arrival;, code stroke, CT now signs of ICH.  Lactate was 7 improved to 2. CT scan showed high grade SBO with transition point in RLQ.  Patient is having BMS.    Gastric distension ?Band is too tight? having difficulty getting the NGT into the stomach after multiple trials by ED, MICU and surgery teams    Wbc 14, afebrile, UTI on Rocephin     Discussed with chief resident and attending:    Dr. Ascencio will come to bedside to deflate the band at the bedside to be able to pass the NGT    - Keep NPO  - NGT is needed  - Serial abdominal exams  - trend labs  - Surgery team 2 will continue to FU

## 2020-11-08 NOTE — AIRWAY REMOVAL NOTE  ADULT & PEDS - ARTIFICAL AIRWAY REMOVAL COMMENTS
Pt extubated and  placed on 40% AM. vitals are stable, no respiratory distress noted, we will continue to monitor.

## 2020-11-08 NOTE — CONSULT NOTE ADULT - SUBJECTIVE AND OBJECTIVE BOX
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 Her HH nurse found her comatose at the beginning of the night shift but was breathing, her day shift nurse is new and though she was just sleeping all day, then 911 was called and was [facility] to ED, suspicion of seizure on the way to [facility], then she was intubated upon arrival as she was not responsive this whole time.  Code stroke was announced, CT scan head was done and did not show any evidence of new stroke, just gliosis and encephalomalacia  Lactate was 7.2 initially, CT scan abdomen and pelvis with IV contrast only was done and showed high grade SBO with transition point in the RLQ.  The patient had a massive BM in the ED according to the medicine resident.    Was not able to obtain history from the patient as she was intubated.    Off note, patient seems to have had lap gastric band insertion as seen on CT scan (unknown date)    PAST MEDICAL & SURGICAL HISTORY:  ICH (intracerebral hemorrhage)    MS (multiple sclerosis)    Status post craniectomy    Social History:  Non-smoker  No EtOH use  Uses MJ recreationally, vape pen   No other illicit drug use    FAMILY HISTORY:  No significant family history    ICU Vital Signs Last 24 Hrs  T(C): 38 (08 Nov 2020 00:25), Max: 38 (08 Nov 2020 00:25)  T(F): 100.4 (08 Nov 2020 00:25), Max: 100.4 (08 Nov 2020 00:25)  HR: 74 (08 Nov 2020 05:00) (74 - 98)  BP: 94/53 (08 Nov 2020 05:00) (94/53 - 186/81)  BP(mean): 68 (08 Nov 2020 05:00) (68 - 91)  ABP: --  ABP(mean): --  RR: 12 (08 Nov 2020 05:00) (6 - 17)  SpO2: 97% (08 Nov 2020 05:00) (95% - 100%)    Gen: Patient is intubated and sedated  Pulm: intubated AC/VC mode  C/V: NSR  Abd: Soft, no grimacing on deep sedation, mild to moderate distention No rebound, no guarding, well healed old lap scars, Band SQ port can be palpated around the epigastric area  Extrem: WWP, no edema                               MEDICATIONS  (STANDING):  cefTRIAXone   IVPB 1000 milliGRAM(s) IV Intermittent every 24 hours  chlorhexidine 0.12% Liquid 15 milliLiter(s) Oral Mucosa every 12 hours  chlorhexidine 2% Cloths 1 Application(s) Topical <User Schedule>  enoxaparin Injectable 40 milliGRAM(s) SubCutaneous every 24 hours  insulin lispro (ADMELOG) corrective regimen sliding scale   SubCutaneous every 6 hours  levETIRAcetam  IVPB 750 milliGRAM(s) IV Intermittent every 12 hours  multiple electrolytes Injection Type 1 1000 milliLiter(s) (120 mL/Hr) IV Continuous <Continuous>  pantoprazole  Injectable 40 milliGRAM(s) IV Push every 24 hours  propofol Infusion 5 MICROgram(s)/kG/Min (2.1 mL/Hr) IV Continuous <Continuous>    I&O's Detail    07 Nov 2020 07:01  -  08 Nov 2020 05:46  --------------------------------------------------------  IN:    IV PiggyBack: 100 mL    multiple electrolytes Injection Type 1.: 240 mL    Propofol: 45 mL    Propofol: 30 mL  Total IN: 415 mL    OUT:    Indwelling Catheter - Urethral (mL): 1420 mL  Total OUT: 1420 mL    Total NET: -1005 mL         14.8   13.20 )-----------( 228      ( 08 Nov 2020 02:26 )             44.6   11-08    145  |  106  |  18  ----------------------------<  137<H>  3.7   |  26  |  0.64    Ca    8.1<L>      08 Nov 2020 02:26    TPro  5.7<L>  /  Alb  3.0<L>  /  TBili  0.3  /  DBili  x   /  AST  19  /  ALT  10  /  AlkPhos  81  11-07    Lactate, Blood (11.08.20 @ 02:26)    Lactate, Blood: 2.0 mmol/L    < from: CT Abdomen and Pelvis w/ IV Cont (11.07.20 @ 22:17) >    FINDINGS:  Lungs: Incompletely visualized lobar atelectasis of the left lower lobe. Concomitant  pneumonia/aspiration not excluded.  Liver: Normal. No mass.  Gallbladder and bile ducts: Normal. No calcified stones. No ductal dilation.  Pancreas: Unremarkable.  Spleen: Normal.  Adrenal glands: Normal. No mass.  Kidneys and ureters: Normal. No hydronephrosis.  Stomach and bowel: Diffuse thickening of wall of the rectum, without associated perirectal fat  stranding, most compatible with proctitis, less likely neoplasm. The ascending, transverse,  descending, and most of the sigmoid colon are mildly to moderately distended with very lowattenuation stool suggesting steatorrhea which implies malabsorption.. There is an abrupt shortsegment narrowing between the stool distended colon and the inflamed rectum which may be  incidental peristalsis but could represent an obstruction secondary to wall thickening of the rectum or  less likely adhesion. High-grade distal small-bowel obstruction with transition point in the right lower  quadrant, presumably secondary to adhesion. Gastric banding device present. Thickening of the wall  of the distal esophagus compatible with esophagitis. Mild distention of the lumen with gas and fluid  suggests gastroesophageal reflux.  Appendix: Normal appendix.  Intraperitoneal space: No pneumoperitoneum or abscess.  Vasculature: No pneumatosis or portal/mesenteric venous gas. IVC filter present.  Lymph nodes: Unremarkable.  Urinary bladder: Unremarkable as visualized.  Reproductive: Prior hysterectomy.  Bones/joints: Unremarkable. No acute fracture.  Soft tissues: Unremarkable.    IMPRESSION:  1. Diffuse thickening of wall of the rectum, without associated perirectal fat stranding, most  compatible with proctitis, less likely neoplasm.  2. The ascending, transverse, descending, and most of the sigmoid colon are mildly to moderately  distended with very low-attenuation stool suggesting steatorrhea which implies malabsorption.. There  is an abrupt short-segment narrowing between the stool distended colon and the inflamed rectum  which may be incidental peristalsis but could represent an obstruction secondary to wall thickening of  the rectum or less likely adhesion.  3. High-grade distal small-bowel obstruction with transition point in the right lower quadrant,  presumably secondary to adhesion.  4. Gastric banding device present. Thickening of the wall of the distal esophagus compatible with  esophagitis. Mild distention of the lumen with gas and fluid suggests gastroesophageal reflux.  5. Incompletely visualized lobar atelectasis of the left lower lobe. 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 Her HH nurse found her comatose at the beginning of the night shift but was breathing, her day shift nurse is new and though she was just sleeping all day, then 911 was called and was [facility] to ED, suspicion of seizure on the way to [facility], then she was intubated upon arrival as she was not responsive this whole time. had 2 episodes of NBNB emesis.  Code stroke was announced, CT scan head was done and did not show any evidence of new stroke, just gliosis and encephalomalacia  Lactate was 7.2 initially, CT scan abdomen and pelvis with IV contrast only was done and showed high grade SBO with transition point in the RLQ.  The patient had a massive BM in the ED according to the medicine resident.    Was not able to obtain history from the patient as she was intubated.    Off note, patient seems to have had lap gastric band insertion as seen on CT scan (unknown date)    PAST MEDICAL & SURGICAL HISTORY:  ICH (intracerebral hemorrhage)    MS (multiple sclerosis)    Status post craniectomy    Social History:  Non-smoker  No EtOH use  Uses MJ recreationally, vape pen   No other illicit drug use    FAMILY HISTORY:  No significant family history    ICU Vital Signs Last 24 Hrs  T(C): 38 (08 Nov 2020 00:25), Max: 38 (08 Nov 2020 00:25)  T(F): 100.4 (08 Nov 2020 00:25), Max: 100.4 (08 Nov 2020 00:25)  HR: 74 (08 Nov 2020 05:00) (74 - 98)  BP: 94/53 (08 Nov 2020 05:00) (94/53 - 186/81)  BP(mean): 68 (08 Nov 2020 05:00) (68 - 91)  ABP: --  ABP(mean): --  RR: 12 (08 Nov 2020 05:00) (6 - 17)  SpO2: 97% (08 Nov 2020 05:00) (95% - 100%)    Gen: Patient is intubated and sedated  Pulm: intubated AC/VC mode  C/V: NSR  Abd: Soft, no grimacing on deep sedation, mild to moderate distention No rebound, no guarding, well healed old lap scars, Band SQ port can be palpated around the epigastric area  Extrem: WWP, no edema                               MEDICATIONS  (STANDING):  cefTRIAXone   IVPB 1000 milliGRAM(s) IV Intermittent every 24 hours  chlorhexidine 0.12% Liquid 15 milliLiter(s) Oral Mucosa every 12 hours  chlorhexidine 2% Cloths 1 Application(s) Topical <User Schedule>  enoxaparin Injectable 40 milliGRAM(s) SubCutaneous every 24 hours  insulin lispro (ADMELOG) corrective regimen sliding scale   SubCutaneous every 6 hours  levETIRAcetam  IVPB 750 milliGRAM(s) IV Intermittent every 12 hours  multiple electrolytes Injection Type 1 1000 milliLiter(s) (120 mL/Hr) IV Continuous <Continuous>  pantoprazole  Injectable 40 milliGRAM(s) IV Push every 24 hours  propofol Infusion 5 MICROgram(s)/kG/Min (2.1 mL/Hr) IV Continuous <Continuous>    I&O's Detail    07 Nov 2020 07:01  -  08 Nov 2020 05:46  --------------------------------------------------------  IN:    IV PiggyBack: 100 mL    multiple electrolytes Injection Type 1.: 240 mL    Propofol: 45 mL    Propofol: 30 mL  Total IN: 415 mL    OUT:    Indwelling Catheter - Urethral (mL): 1420 mL  Total OUT: 1420 mL    Total NET: -1005 mL         14.8   13.20 )-----------( 228      ( 08 Nov 2020 02:26 )             44.6   11-08    145  |  106  |  18  ----------------------------<  137<H>  3.7   |  26  |  0.64    Ca    8.1<L>      08 Nov 2020 02:26    TPro  5.7<L>  /  Alb  3.0<L>  /  TBili  0.3  /  DBili  x   /  AST  19  /  ALT  10  /  AlkPhos  81  11-07    Lactate, Blood (11.08.20 @ 02:26)    Lactate, Blood: 2.0 mmol/L    < from: CT Abdomen and Pelvis w/ IV Cont (11.07.20 @ 22:17) >    FINDINGS:  Lungs: Incompletely visualized lobar atelectasis of the left lower lobe. Concomitant  pneumonia/aspiration not excluded.  Liver: Normal. No mass.  Gallbladder and bile ducts: Normal. No calcified stones. No ductal dilation.  Pancreas: Unremarkable.  Spleen: Normal.  Adrenal glands: Normal. No mass.  Kidneys and ureters: Normal. No hydronephrosis.  Stomach and bowel: Diffuse thickening of wall of the rectum, without associated perirectal fat  stranding, most compatible with proctitis, less likely neoplasm. The ascending, transverse,  descending, and most of the sigmoid colon are mildly to moderately distended with very lowattenuation stool suggesting steatorrhea which implies malabsorption.. There is an abrupt shortsegment narrowing between the stool distended colon and the inflamed rectum which may be  incidental peristalsis but could represent an obstruction secondary to wall thickening of the rectum or  less likely adhesion. High-grade distal small-bowel obstruction with transition point in the right lower  quadrant, presumably secondary to adhesion. Gastric banding device present. Thickening of the wall  of the distal esophagus compatible with esophagitis. Mild distention of the lumen with gas and fluid  suggests gastroesophageal reflux.  Appendix: Normal appendix.  Intraperitoneal space: No pneumoperitoneum or abscess.  Vasculature: No pneumatosis or portal/mesenteric venous gas. IVC filter present.  Lymph nodes: Unremarkable.  Urinary bladder: Unremarkable as visualized.  Reproductive: Prior hysterectomy.  Bones/joints: Unremarkable. No acute fracture.  Soft tissues: Unremarkable.    IMPRESSION:  1. Diffuse thickening of wall of the rectum, without associated perirectal fat stranding, most  compatible with proctitis, less likely neoplasm.  2. The ascending, transverse, descending, and most of the sigmoid colon are mildly to moderately  distended with very low-attenuation stool suggesting steatorrhea which implies malabsorption.. There  is an abrupt short-segment narrowing between the stool distended colon and the inflamed rectum  which may be incidental peristalsis but could represent an obstruction secondary to wall thickening of  the rectum or less likely adhesion.  3. High-grade distal small-bowel obstruction with transition point in the right lower quadrant,  presumably secondary to adhesion.  4. Gastric banding device present. Thickening of the wall of the distal esophagus compatible with  esophagitis. Mild distention of the lumen with gas and fluid suggests gastroesophageal reflux.  5. Incompletely visualized lobar atelectasis of the left lower lobe. Concomitant pneumonia/aspiration    < end of copied text >

## 2020-11-08 NOTE — PROGRESS NOTE ADULT - ASSESSMENT
Ms. Reilly is a 56 yo F with PMH of MS, previous ICH, and toxin induced coma previously who presents with coma, CT without new acute findings but notable for significant gliosis and encephalomalacia; given stated history of seizure/coma in setting of cannabis use and otherwise isolated lactic acidosis clinical suspicion for seizure is high. UTI noted may have precipitated seizure.    Neurology  #Intubated and Sedated. Previously was on propofol with concern for seizure activity as well.  - vEEG placed.  - removed prop sedation and keppra to eval on vEEG.  - epilepsy and gen neurology consulted.     CARDIOVASCULAR  #HTN.   - holding home verapamil, restart PRN as BP requires    PULMONARY  #Acute Respiratory Failure 2/2 airway protection. Pt intubated overnight with concern for poor airway protection.  - remove sedation and keprra.   - trial CPAP and extubate today if patient tolerates    GASTROINTESTINAL  # Partial Ileus or SBO. As patient has very dilated loops of bowel and small bowel with fluid within it. However pt did have vomitus and BMs in ED overnight. Attempted NGT to suction after multiple attempts to place, per gen surg her lap band may be cause of repeat kinking.  - gen surg eval in AM along with bariatric surgery. Dr. Ascencio deflated lap band NGT was placed however nothing was drained so likely not functioning  - GI consult for NGT placement.     RENAL  - no evidence of MELL, trend BMP    RHEUM  # MS  - holding home antidepressants and baclofen while patient is NPO.  - proper med recc required.      # Spastic bladder  - holding home oxybutynin, tamsulosin for spastic bladder while [place] in place for urine output monitoring in critically ill        INFECTIOUS DISEASE  # UTI   In spite of lactic acidosis only meets 1/4 SIRS (low-grade fever) so not consistent with sepsis. Note that leukocytosis poss  after patient received Solu-medrol in ED, not secondary to infection.  - CTX 2 g q24h (11/7 - )     ENDOCRINE  - LDSSI     HEME   # Chronic anticoagulation  - was previously on eliquis 2.5 bid for DVT in the past, but per step brother she had dopplers last month that showed no DVT present.  - c/w eliquis 2.5 BID    FLUIDS/ELECTROLYTES/NUTRITION  -IVF: s/p 3L NS in ED  -Monitor, Replete to K>4 and Mg>2  -Diet: NPO  PROPHYLAXIS  -DVT: Eliquis 2.5  -GI: PPI    DISPO: MICU  CODE STATUS: FULL CODE

## 2020-11-08 NOTE — H&P ADULT - HISTORY OF PRESENT ILLNESS
Ms. Reilly is a 56 yo F with PMH of MS (dx age 35, bedbound, AOx3), intracranial bleed many years ago, HTN, presents after being found poorly responsive but breathing in her bed at home. She presents with her night HHA who has known her for 5 years. She says she arrived at 18:00 to begin her shift and the day HHA, who is new and does not know the patient very well, told her the patient had been "sleeping" for most of the day. The night HHA tried to rouse patient including applying ice to her face and when she did not rouse called 911. 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 HCP is her stepbrother Fransico Mckay [phone].    ED COURSE: Patient BIBEMS and had to be intubated on arrival as she was breathing at approx 5/min and not protecting airway. ED Vitals 97.9 (rectal), 92, 112/83, 6, 95% on 4L. NIHSS 30 on arrival, no new CVA or ICH on CTH/CTA/CTP. VBG initially pH 7.45 but repeat 7.26, pCO2 71, HCO3 32. Lactate 7.0. UA grossly positive for UTI. CT abd with high grade SBO and dilated loops of bowel. Received succinylcholine, etomidate, HCO3 x2 amps, CTX/Flagyl, NS 3 L, Narcan.

## 2020-11-08 NOTE — PROGRESS NOTE ADULT - SUBJECTIVE AND OBJECTIVE BOX
KIMBERLY NEGRO, 55y, Female  MRN-[id number]  Patient is a 55y old  Female who presents with a chief complaint of possible seizure bowel distension (2020 08:44)      OVERNIGHT EVENTS:     SUBJECTIVE:  -------------------------------------------------------------------------------  VITAL SIGNS:  Vital Signs Last 24 Hrs  T(C): 37.3 (2020 10:00), Max: 38 (2020 00:25)  T(F): 99.1 (2020 10:00), Max: 100.4 (2020 00:25)  HR: 74 (2020 12:) (61 - 98)  BP: 124/64 (:) (94/53 - 186/81)  BP(mean): 87 (:) (68 - 102)  RR: 12 (2020 12:) (6 - 17)  SpO2: 98% (2020 12:) (95% - 100%)  Mode: AC/ CMV (Assist Control/ Continuous Mandatory Ventilation), RR (machine): 12, TV (machine): 380, FiO2: 40, PEEP: 5, ITime: 1, MAP: 7.2, PIP: 15    I&O's Summary    2020 07:  -  2020 07:00  --------------------------------------------------------  IN: 460 mL / OUT: 1560 mL / NET: -1100 mL    :  -  2020 12:39  --------------------------------------------------------  IN: 75.5 mL / OUT: 195 mL / NET: -119.5 mL        PHYSICAL EXAM:    General: NAD, well developed  HEENT: NC/AT; EOMI, PERRLA, anicteric sclera; moist mucosal membranes.  Neck: supple, trachea midline  Cardiovascular: RRR, +S1/S2; NO M/R/G  Respiratory: CTA B/L; no W/R/R  Gastrointestinal: soft, NT/ND; +BSx4  Extremities: WWP; no edema or cyanosis  Vascular: 2+ radial, DP/PT pulses B/L  Neurological: AAOx3; no focal deficits    ALLERGIES:  Allergies    No Known Allergies    Intolerances        MEDICATIONS:  MEDICATIONS  (STANDING):  cefTRIAXone   IVPB 2000 milliGRAM(s) IV Intermittent every 24 hours  chlorhexidine 0.12% Liquid 15 milliLiter(s) Oral Mucosa every 12 hours  chlorhexidine 2% Cloths 1 Application(s) Topical <User Schedule>  enoxaparin Injectable 40 milliGRAM(s) SubCutaneous every 24 hours  insulin lispro (ADMELOG) corrective regimen sliding scale   SubCutaneous every 6 hours  pantoprazole  Injectable 40 milliGRAM(s) IV Push every 24 hours    MEDICATIONS  (PRN):      -------------------------------------------------------------------------------  LABS:                        14.8   13.20 )-----------( 228      ( 2020 02:26 )             44.6     11-08    145  |  106  |  18  ----------------------------<  137<H>  3.7   |  26  |  0.64    Ca    8.1<L>      2020 02:26    TPro  5.7<L>  /  Alb  3.0<L>  /  TBili  0.3  /  DBili  x   /  AST  19  /  ALT  10  /  AlkPhos  81  11-07    LIVER FUNCTIONS - ( 2020 23:01 )  Alb: 3.0 g/dL / Pro: 5.7 g/dL / ALK PHOS: 81 U/L / ALT: 10 U/L / AST: 19 U/L / GGT: x           PT/INR - ( 2020 02:26 )   PT: 15.3 sec;   INR: 1.29          PTT - ( 2020 02:26 )  PTT:33.3 sec  ABG - ( 2020 04:36 )  pH, Arterial: 7.48  pH, Blood: x     /  pCO2: 38    /  pO2: 87    / HCO3: 27    / Base Excess: 3.7   /  SaO2: 97                Lactate, Blood: 2.0 mmol/L ( @ 02:26)  Lactate, Blood: 5.3 mmol/L ( @ 22:55)  Lactate, Blood: 7.0 mmol/L ( @ 20:21)    CARDIAC MARKERS ( 2020 02:26 )  x     / x     / 110 U/L / x     / x      CARDIAC MARKERS ( 2020 23:01 )  x     / 0.02 ng/mL / x     / x     / x          Urinalysis Basic - ( 2020 21:39 )    Color: Yellow / Appearance: Clear / S.015 / pH: x  Gluc: x / Ketone: NEGATIVE  / Bili: Negative / Urobili: 0.2 E.U./dL   Blood: x / Protein: Trace mg/dL / Nitrite: NEGATIVE   Leuk Esterase: Moderate / RBC: < 5 /HPF / WBC Many /HPF   Sq Epi: x / Non Sq Epi: x / Bacteria: Many /HPF      CAPILLARY BLOOD GLUCOSE  133 (2020 19:18)      POCT Blood Glucose.: 105 mg/dL (2020 11:35)  POCT Blood Glucose.: 126 mg/dL (2020 06:23)  POCT Blood Glucose.: 162 mg/dL (2020 19:04)        Culture - Blood (collected 2020 23:52)  Source: .Blood Blood  Preliminary Report (2020 12:00):    No growth at 12 hours    Culture - Blood (collected 2020 23:52)  Source: .Blood Blood  Preliminary Report (2020 12:00):    No growth at 12 hours    Culture - Urine (collected 2020 21:58)  Source: .Urine Clean Catch (Midstream)  Preliminary Report (2020 12:01):    90,000 CFU/ml Gram Negative Rods    Identification and susceptibility to follow.        RADIOLOGY & ADDITIONAL TESTS: Reviewed.   MARKY KIMBERLY, 55y, Female  MRN-[id number]  Patient is a 55y old  Female who presents with a chief complaint of possible seizure bowel distension (2020 08:44)      OVERNIGHT EVENTS: Pt intubated and sedated overnight on propofol for concern of airway protection as pt RR was 5 in ED.    SUBJECTIVE: Pt seen and examined in AM. Pt was intubated and sedated on propofol at time of my exam.   -------------------------------------------------------------------------------  VITAL SIGNS:  Vital Signs Last 24 Hrs  T(C): 37.3 (2020 10:00), Max: 38 (2020 00:25)  T(F): 99.1 (2020 10:00), Max: 100.4 (2020 00:25)  HR: 74 (2020 12:00) (61 - 98)  BP: 124/64 (2020 12:00) (94/53 - 186/81)  BP(mean): 87 (2020 12:) (68 - 102)  RR: 12 (2020 12:00) (6 - 17)  SpO2: 98% (2020 12:00) (95% - 100%)  Mode: AC/ CMV (Assist Control/ Continuous Mandatory Ventilation), RR (machine): 12, TV (machine): 380, FiO2: 40, PEEP: 5, ITime: 1, MAP: 7.2, PIP: 15    I&O's Summary    2020 07:01  -  2020 07:00  --------------------------------------------------------  IN: 460 mL / OUT: 1560 mL / NET: -1100 mL    2020 07:01  -  2020 12:39  --------------------------------------------------------  IN: 75.5 mL / OUT: 195 mL / NET: -119.5 mL        PHYSICAL EXAM:    General: intubated and sedated female.  HEENT: NC/AT; pupils 5mm and PERRLA, anicteric sclera; moist mucosal membranes.  Cardiovascular: RRR, +S1/S2; NO M/R/G  Respiratory: CTA B/L; no W/R/R  Gastrointestinal: soft, NT/ND; hypoactive bowel sounds  Extremities: WWP; 2+ pitting edema to ankles.  Vascular: 2+ radial, DP/PT pulses B/L  Neurological: AAOx0; + Babinksi bilaterally. + sustained clonus on R foot, + 4-5 beats clonus on L foot.    ALLERGIES:  Allergies    No Known Allergies    Intolerances        MEDICATIONS:  MEDICATIONS  (STANDING):  cefTRIAXone   IVPB 2000 milliGRAM(s) IV Intermittent every 24 hours  chlorhexidine 0.12% Liquid 15 milliLiter(s) Oral Mucosa every 12 hours  chlorhexidine 2% Cloths 1 Application(s) Topical <User Schedule>  enoxaparin Injectable 40 milliGRAM(s) SubCutaneous every 24 hours  insulin lispro (ADMELOG) corrective regimen sliding scale   SubCutaneous every 6 hours  pantoprazole  Injectable 40 milliGRAM(s) IV Push every 24 hours    MEDICATIONS  (PRN):      -------------------------------------------------------------------------------  LABS:                        14.8   13.20 )-----------( 228      ( 2020 02:26 )             44.6     11-08    145  |  106  |  18  ----------------------------<  137<H>  3.7   |  26  |  0.64    Ca    8.1<L>      2020 02:26    TPro  5.7<L>  /  Alb  3.0<L>  /  TBili  0.3  /  DBili  x   /  AST  19  /  ALT  10  /  AlkPhos  81  11-07    LIVER FUNCTIONS - ( 2020 23:01 )  Alb: 3.0 g/dL / Pro: 5.7 g/dL / ALK PHOS: 81 U/L / ALT: 10 U/L / AST: 19 U/L / GGT: x           PT/INR - ( 2020 02:26 )   PT: 15.3 sec;   INR: 1.29          PTT - ( 2020 02:26 )  PTT:33.3 sec  ABG - ( 2020 04:36 )  pH, Arterial: 7.48  pH, Blood: x     /  pCO2: 38    /  pO2: 87    / HCO3: 27    / Base Excess: 3.7   /  SaO2: 97                Lactate, Blood: 2.0 mmol/L ( @ 02:26)  Lactate, Blood: 5.3 mmol/L ( @ 22:55)  Lactate, Blood: 7.0 mmol/L ( @ 20:21)    CARDIAC MARKERS ( 2020 02:26 )  x     / x     / 110 U/L / x     / x      CARDIAC MARKERS ( 2020 23:01 )  x     / 0.02 ng/mL / x     / x     / x          Urinalysis Basic - ( 2020 21:39 )    Color: Yellow / Appearance: Clear / S.015 / pH: x  Gluc: x / Ketone: NEGATIVE  / Bili: Negative / Urobili: 0.2 E.U./dL   Blood: x / Protein: Trace mg/dL / Nitrite: NEGATIVE   Leuk Esterase: Moderate / RBC: < 5 /HPF / WBC Many /HPF   Sq Epi: x / Non Sq Epi: x / Bacteria: Many /HPF      CAPILLARY BLOOD GLUCOSE  133 (2020 19:18)      POCT Blood Glucose.: 105 mg/dL (2020 11:35)  POCT Blood Glucose.: 126 mg/dL (2020 06:23)  POCT Blood Glucose.: 162 mg/dL (2020 19:04)        Culture - Blood (collected 2020 23:52)  Source: .Blood Blood  Preliminary Report (2020 12:00):    No growth at 12 hours    Culture - Blood (collected 2020 23:52)  Source: .Blood Blood  Preliminary Report (2020 12:00):    No growth at 12 hours    Culture - Urine (collected 2020 21:58)  Source: .Urine Clean Catch (Midstream)  Preliminary Report (2020 12:01):    90,000 CFU/ml Gram Negative Rods    Identification and susceptibility to follow.        RADIOLOGY & ADDITIONAL TESTS: Reviewed.

## 2020-11-08 NOTE — H&P ADULT - ASSESSMENT
Ms. Reilly is a 56 yo F with PMH of MS who presents with coma, CT without new acute findings but notable for significant gliosis and encephalomalacia; given stated history of seizure in setting of cannabis use and otherwise isolated lactic acidosis clinical suspicion for seizure is high. UTI noted may have precipitated seizure.    CARDIOVASCULAR  # HTN  - holding antihypertensives    PULMONARY  - intubated for airway protection    GASTROINTESTINAL  # SBO  - has had BM x2 in ED, will hold off on NGT to suction for now after multiple attempts to place, per gen surg her lap band may be cause of repeat kinking of NGT/OGT (see CXR in ED after ED provider attempted, CXR after MICU resident attempted, both similar placement, could not aspirate gastric contents)    RENAL  - no evidence of MELL, trend BMP    Neurology  # Coma (GCS 4, grimace to sternal rub)  - concern for seizure  - propofol for sedation   - s/p Keppra load with 1 g, then started 750 mg q12h   - vEEG ordered, tech to come in AM    INFECTIOUS DISEASE  # UTI   In spite of lactic acidosis only meets 1/4 SIRS (low-grade fever) so not consistent with sepsis. Note that leukocytosis only occurred due to demargination of PMNs after patient received an amp of Solu-medrol in ED, not secondary to infection.  - CTX 1 g q24h (11/7 - )     ENDOCRINE  - LDSSI     FLUIDS/ELECTROLYTES/NUTRITION  -IVF: s/p 3L NS in ED  -Monitor, Replete to K>4 and Mg>2  -Diet: NPO  PROPHYLAXIS  -DVT: Lovenox  -GI: PPI    DISPO: MICU  CODE STATUS: FULL CODE   Ms. Reilly is a 54 yo F with PMH of MS who presents with coma, CT without new acute findings but notable for significant gliosis and encephalomalacia; given stated history of seizure in setting of cannabis use and otherwise isolated lactic acidosis clinical suspicion for seizure is high. UTI noted may have precipitated seizure.    CARDIOVASCULAR  # HTN  - holding antihypertensives    PULMONARY  - intubated for airway protection    GASTROINTESTINAL  # SBO  - has had BM x2 in ED, will hold off on NGT to suction for now after multiple attempts to place, per gen surg her lap band may be cause of repeat kinking of NGT/OGT (see CXR in ED after ED provider attempted, CXR after MICU resident attempted, both similar placement, could not aspirate gastric contents)    RENAL  - no evidence of MELL, trend BMP    Neurology  # Coma (GCS 4, grimace to sternal rub)  - concern for seizure  - propofol for sedation   - s/p Keppra load with 1 g, then started 750 mg q12h   - vEEG ordered, tech to come in AM    INFECTIOUS DISEASE  # UTI   In spite of lactic acidosis only meets 1/4 SIRS (low-grade fever) so not consistent with sepsis. Note that leukocytosis only occurred due to demargination of PMNs after patient received an amp of Solu-medrol in ED, not secondary to infection.  - CTX 1 g q24h (11/7 - )     ENDOCRINE  - LDSSI     HEME   # Chronic anticoagulation  - holding home Eliquis, unclear indication; obtain collateral in AM    FLUIDS/ELECTROLYTES/NUTRITION  -IVF: s/p 3L NS in ED  -Monitor, Replete to K>4 and Mg>2  -Diet: NPO  PROPHYLAXIS  -DVT: Lovenox  -GI: PPI    DISPO: MICU  CODE STATUS: FULL CODE   Ms. Reilly is a 56 yo F with PMH of MS who presents with coma, CT without new acute findings but notable for significant gliosis and encephalomalacia; given stated history of seizure in setting of cannabis use and otherwise isolated lactic acidosis clinical suspicion for seizure is high. UTI noted may have precipitated seizure.    CARDIOVASCULAR  # HTN  - holding home verapamil, restart PRN as BP requires    PULMONARY  - intubated for airway protection    GASTROINTESTINAL  # SBO  - has had BM x2 in ED, will hold off on NGT to suction for now after multiple attempts to place, per gen surg her lap band may be cause of repeat kinking of NGT/OGT (see CXR in ED after ED provider attempted, CXR after MICU resident attempted, both similar placement, could not aspirate gastric contents)    RENAL  - no evidence of MELL, trend BMP    RHEUM  # MS  - holding home antidepressants, Baclofen while NPO      # Spastic bladder  - holding home oxybutynin, tamsulosin for spastic bladder while [place] in place for urine output monitoring in critically ill    Neurology  # Coma (GCS 4, grimace to sternal rub)  - concern for seizure  - propofol for sedation   - s/p Keppra load with 1 g, then started 750 mg q12h   - vEEG ordered, tech to come in AM    INFECTIOUS DISEASE  # UTI   In spite of lactic acidosis only meets 1/4 SIRS (low-grade fever) so not consistent with sepsis. Note that leukocytosis only occurred due to demargination of PMNs after patient received an amp of Solu-medrol in ED, not secondary to infection.  - CTX 1 g q24h (11/7 - )     ENDOCRINE  - LDSSI     HEME   # Chronic anticoagulation  - holding home Eliquis, unclear indication; obtain collateral in AM    FLUIDS/ELECTROLYTES/NUTRITION  -IVF: s/p 3L NS in ED  -Monitor, Replete to K>4 and Mg>2  -Diet: NPO  PROPHYLAXIS  -DVT: Lovenox  -GI: PPI    DISPO: MICU  CODE STATUS: FULL CODE

## 2020-11-08 NOTE — CONSULT NOTE ADULT - SUBJECTIVE AND OBJECTIVE BOX
Called [facility] about this women who came unconscious to ED and had distended abdomen IN ED  she is sp lap band and ct was read as possible obstruction  On my review she has distended stomach distended colon and air in rectum  I do not see evidence of obstruction with so much air distal and bowel function  do think should place ng and then reassess  ng unable to be placed by housestaff because of the band   and they were unable to deflate the band  I removed 8 cc of fluid from the band   Aspiration because of the high pressure from band certainly possible  will place ngt now

## 2020-11-09 LAB
-  AMPICILLIN/SULBACTAM: SIGNIFICANT CHANGE UP
-  AMPICILLIN: SIGNIFICANT CHANGE UP
-  CEFAZOLIN: SIGNIFICANT CHANGE UP
-  CEFTRIAXONE: SIGNIFICANT CHANGE UP
-  CIPROFLOXACIN: SIGNIFICANT CHANGE UP
-  GENTAMICIN: SIGNIFICANT CHANGE UP
-  NITROFURANTOIN: SIGNIFICANT CHANGE UP
-  PIPERACILLIN/TAZOBACTAM: SIGNIFICANT CHANGE UP
-  TOBRAMYCIN: SIGNIFICANT CHANGE UP
-  TRIMETHOPRIM/SULFAMETHOXAZOLE: SIGNIFICANT CHANGE UP
ALBUMIN SERPL ELPH-MCNC: 2.9 G/DL — LOW (ref 3.3–5)
ALP SERPL-CCNC: 64 U/L — SIGNIFICANT CHANGE UP (ref 40–120)
ALT FLD-CCNC: 11 U/L — SIGNIFICANT CHANGE UP (ref 10–45)
ANION GAP SERPL CALC-SCNC: 11 MMOL/L — SIGNIFICANT CHANGE UP (ref 5–17)
AST SERPL-CCNC: 18 U/L — SIGNIFICANT CHANGE UP (ref 10–40)
BASOPHILS # BLD AUTO: 0.01 K/UL — SIGNIFICANT CHANGE UP (ref 0–0.2)
BASOPHILS NFR BLD AUTO: 0.1 % — SIGNIFICANT CHANGE UP (ref 0–2)
BILIRUB SERPL-MCNC: 0.2 MG/DL — SIGNIFICANT CHANGE UP (ref 0.2–1.2)
BUN SERPL-MCNC: 10 MG/DL — SIGNIFICANT CHANGE UP (ref 7–23)
CALCIUM SERPL-MCNC: 8.6 MG/DL — SIGNIFICANT CHANGE UP (ref 8.4–10.5)
CHLORIDE SERPL-SCNC: 109 MMOL/L — HIGH (ref 96–108)
CO2 SERPL-SCNC: 25 MMOL/L — SIGNIFICANT CHANGE UP (ref 22–31)
CREAT SERPL-MCNC: 0.45 MG/DL — LOW (ref 0.5–1.3)
CULTURE RESULTS: SIGNIFICANT CHANGE UP
EOSINOPHIL # BLD AUTO: 0.01 K/UL — SIGNIFICANT CHANGE UP (ref 0–0.5)
EOSINOPHIL NFR BLD AUTO: 0.1 % — SIGNIFICANT CHANGE UP (ref 0–6)
GLUCOSE BLDC GLUCOMTR-MCNC: 75 MG/DL — SIGNIFICANT CHANGE UP (ref 70–99)
GLUCOSE SERPL-MCNC: 78 MG/DL — SIGNIFICANT CHANGE UP (ref 70–99)
HCT VFR BLD CALC: 41.8 % — SIGNIFICANT CHANGE UP (ref 34.5–45)
HCV AB S/CO SERPL IA: 0.07 S/CO — SIGNIFICANT CHANGE UP
HCV AB SERPL-IMP: SIGNIFICANT CHANGE UP
HGB BLD-MCNC: 13.4 G/DL — SIGNIFICANT CHANGE UP (ref 11.5–15.5)
IMM GRANULOCYTES NFR BLD AUTO: 0.2 % — SIGNIFICANT CHANGE UP (ref 0–1.5)
LACTATE SERPL-SCNC: 0.9 MMOL/L — SIGNIFICANT CHANGE UP (ref 0.5–2)
LYMPHOCYTES # BLD AUTO: 1.19 K/UL — SIGNIFICANT CHANGE UP (ref 1–3.3)
LYMPHOCYTES # BLD AUTO: 14.4 % — SIGNIFICANT CHANGE UP (ref 13–44)
MAGNESIUM SERPL-MCNC: 2.1 MG/DL — SIGNIFICANT CHANGE UP (ref 1.6–2.6)
MCHC RBC-ENTMCNC: 31.8 PG — SIGNIFICANT CHANGE UP (ref 27–34)
MCHC RBC-ENTMCNC: 32.1 GM/DL — SIGNIFICANT CHANGE UP (ref 32–36)
MCV RBC AUTO: 99.1 FL — SIGNIFICANT CHANGE UP (ref 80–100)
METHOD TYPE: SIGNIFICANT CHANGE UP
MONOCYTES # BLD AUTO: 0.54 K/UL — SIGNIFICANT CHANGE UP (ref 0–0.9)
MONOCYTES NFR BLD AUTO: 6.5 % — SIGNIFICANT CHANGE UP (ref 2–14)
NEUTROPHILS # BLD AUTO: 6.49 K/UL — SIGNIFICANT CHANGE UP (ref 1.8–7.4)
NEUTROPHILS NFR BLD AUTO: 78.7 % — HIGH (ref 43–77)
NRBC # BLD: 0 /100 WBCS — SIGNIFICANT CHANGE UP (ref 0–0)
ORGANISM # SPEC MICROSCOPIC CNT: SIGNIFICANT CHANGE UP
ORGANISM # SPEC MICROSCOPIC CNT: SIGNIFICANT CHANGE UP
PHOSPHATE SERPL-MCNC: 1.2 MG/DL — LOW (ref 2.5–4.5)
PLATELET # BLD AUTO: 197 K/UL — SIGNIFICANT CHANGE UP (ref 150–400)
POTASSIUM SERPL-MCNC: 3.6 MMOL/L — SIGNIFICANT CHANGE UP (ref 3.5–5.3)
POTASSIUM SERPL-SCNC: 3.6 MMOL/L — SIGNIFICANT CHANGE UP (ref 3.5–5.3)
PROT SERPL-MCNC: 5.8 G/DL — LOW (ref 6–8.3)
RBC # BLD: 4.22 M/UL — SIGNIFICANT CHANGE UP (ref 3.8–5.2)
RBC # FLD: 12.9 % — SIGNIFICANT CHANGE UP (ref 10.3–14.5)
SODIUM SERPL-SCNC: 145 MMOL/L — SIGNIFICANT CHANGE UP (ref 135–145)
SPECIMEN SOURCE: SIGNIFICANT CHANGE UP
WBC # BLD: 8.26 K/UL — SIGNIFICANT CHANGE UP (ref 3.8–10.5)
WBC # FLD AUTO: 8.26 K/UL — SIGNIFICANT CHANGE UP (ref 3.8–10.5)

## 2020-11-09 PROCEDURE — 99233 SBSQ HOSP IP/OBS HIGH 50: CPT | Mod: GC

## 2020-11-09 PROCEDURE — 95720 EEG PHY/QHP EA INCR W/VEEG: CPT

## 2020-11-09 PROCEDURE — 99232 SBSQ HOSP IP/OBS MODERATE 35: CPT

## 2020-11-09 PROCEDURE — 99233 SBSQ HOSP IP/OBS HIGH 50: CPT

## 2020-11-09 RX ORDER — BUPROPION HYDROCHLORIDE 150 MG/1
1 TABLET, EXTENDED RELEASE ORAL
Qty: 0 | Refills: 0 | DISCHARGE

## 2020-11-09 RX ORDER — ESCITALOPRAM OXALATE 10 MG/1
0 TABLET, FILM COATED ORAL
Qty: 0 | Refills: 0 | DISCHARGE

## 2020-11-09 RX ORDER — BUPROPION HYDROCHLORIDE 150 MG/1
0 TABLET, EXTENDED RELEASE ORAL
Qty: 0 | Refills: 0 | DISCHARGE

## 2020-11-09 RX ORDER — ACYCLOVIR SODIUM 500 MG
0 VIAL (EA) INTRAVENOUS
Qty: 0 | Refills: 0 | DISCHARGE

## 2020-11-09 RX ORDER — BACLOFEN 100 %
0 POWDER (GRAM) MISCELLANEOUS
Qty: 0 | Refills: 0 | DISCHARGE

## 2020-11-09 RX ORDER — ESCITALOPRAM OXALATE 10 MG/1
40 TABLET, FILM COATED ORAL DAILY
Refills: 0 | Status: DISCONTINUED | OUTPATIENT
Start: 2020-11-09 | End: 2020-11-11

## 2020-11-09 RX ORDER — CLONAZEPAM 1 MG
0 TABLET ORAL
Qty: 0 | Refills: 0 | DISCHARGE

## 2020-11-09 RX ORDER — LACTOBACILLUS ACIDOPHILUS 100MM CELL
1 CAPSULE ORAL DAILY
Refills: 0 | Status: DISCONTINUED | OUTPATIENT
Start: 2020-11-09 | End: 2020-11-11

## 2020-11-09 RX ORDER — VALACYCLOVIR 500 MG/1
500 TABLET, FILM COATED ORAL EVERY 12 HOURS
Refills: 0 | Status: DISCONTINUED | OUTPATIENT
Start: 2020-11-09 | End: 2020-11-11

## 2020-11-09 RX ORDER — BUPROPION HYDROCHLORIDE 150 MG/1
200 TABLET, EXTENDED RELEASE ORAL
Qty: 0 | Refills: 0 | DISCHARGE

## 2020-11-09 RX ORDER — ESCITALOPRAM OXALATE 10 MG/1
40 TABLET, FILM COATED ORAL
Qty: 0 | Refills: 0 | DISCHARGE

## 2020-11-09 RX ORDER — TAMSULOSIN HYDROCHLORIDE 0.4 MG/1
0.4 CAPSULE ORAL AT BEDTIME
Refills: 0 | Status: DISCONTINUED | OUTPATIENT
Start: 2020-11-09 | End: 2020-11-09

## 2020-11-09 RX ORDER — TAMSULOSIN HYDROCHLORIDE 0.4 MG/1
0.4 CAPSULE ORAL
Qty: 0 | Refills: 0 | DISCHARGE

## 2020-11-09 RX ORDER — CLONAZEPAM 1 MG
1.5 TABLET ORAL
Qty: 0 | Refills: 0 | DISCHARGE

## 2020-11-09 RX ORDER — VERAPAMIL HCL 240 MG
0 CAPSULE, EXTENDED RELEASE PELLETS 24 HR ORAL
Qty: 0 | Refills: 0 | DISCHARGE

## 2020-11-09 RX ORDER — TAMSULOSIN HYDROCHLORIDE 0.4 MG/1
0 CAPSULE ORAL
Qty: 0 | Refills: 0 | DISCHARGE

## 2020-11-09 RX ORDER — SACCHAROMYCES BOULARDII 250 MG
0 POWDER IN PACKET (EA) ORAL
Qty: 0 | Refills: 0 | DISCHARGE

## 2020-11-09 RX ORDER — TRAZODONE HCL 50 MG
0 TABLET ORAL
Qty: 0 | Refills: 0 | DISCHARGE

## 2020-11-09 RX ORDER — MAGNESIUM OXIDE 400 MG ORAL TABLET 241.3 MG
400 TABLET ORAL
Refills: 0 | Status: DISCONTINUED | OUTPATIENT
Start: 2020-11-09 | End: 2020-11-09

## 2020-11-09 RX ORDER — LEVETIRACETAM 250 MG/1
500 TABLET, FILM COATED ORAL
Refills: 0 | Status: DISCONTINUED | OUTPATIENT
Start: 2020-11-09 | End: 2020-11-11

## 2020-11-09 RX ORDER — APIXABAN 2.5 MG/1
2.5 TABLET, FILM COATED ORAL EVERY 12 HOURS
Refills: 0 | Status: DISCONTINUED | OUTPATIENT
Start: 2020-11-09 | End: 2020-11-11

## 2020-11-09 RX ORDER — FAMOTIDINE 10 MG/ML
0 INJECTION INTRAVENOUS
Qty: 0 | Refills: 0 | DISCHARGE

## 2020-11-09 RX ORDER — OXYBUTYNIN CHLORIDE 5 MG
10 TABLET ORAL DAILY
Refills: 0 | Status: DISCONTINUED | OUTPATIENT
Start: 2020-11-09 | End: 2020-11-09

## 2020-11-09 RX ORDER — BUPROPION HYDROCHLORIDE 150 MG/1
100 TABLET, EXTENDED RELEASE ORAL DAILY
Refills: 0 | Status: DISCONTINUED | OUTPATIENT
Start: 2020-11-09 | End: 2020-11-09

## 2020-11-09 RX ORDER — OXYBUTYNIN CHLORIDE 5 MG
10 TABLET ORAL
Qty: 0 | Refills: 0 | DISCHARGE

## 2020-11-09 RX ORDER — OXYBUTYNIN CHLORIDE 5 MG
5 TABLET ORAL
Refills: 0 | Status: DISCONTINUED | OUTPATIENT
Start: 2020-11-09 | End: 2020-11-11

## 2020-11-09 RX ORDER — VERAPAMIL HCL 240 MG
120 CAPSULE, EXTENDED RELEASE PELLETS 24 HR ORAL
Qty: 0 | Refills: 0 | DISCHARGE

## 2020-11-09 RX ORDER — BUPROPION HYDROCHLORIDE 150 MG/1
200 TABLET, EXTENDED RELEASE ORAL DAILY
Refills: 0 | Status: DISCONTINUED | OUTPATIENT
Start: 2020-11-09 | End: 2020-11-11

## 2020-11-09 RX ORDER — OXYBUTYNIN CHLORIDE 5 MG
0 TABLET ORAL
Qty: 0 | Refills: 0 | DISCHARGE

## 2020-11-09 RX ORDER — POTASSIUM PHOSPHATE, MONOBASIC POTASSIUM PHOSPHATE, DIBASIC 236; 224 MG/ML; MG/ML
30 INJECTION, SOLUTION INTRAVENOUS ONCE
Refills: 0 | Status: COMPLETED | OUTPATIENT
Start: 2020-11-09 | End: 2020-11-09

## 2020-11-09 RX ORDER — DEXTROSE 50 % IN WATER 50 %
25 SYRINGE (ML) INTRAVENOUS ONCE
Refills: 0 | Status: COMPLETED | OUTPATIENT
Start: 2020-11-09 | End: 2020-11-09

## 2020-11-09 RX ORDER — TAMSULOSIN HYDROCHLORIDE 0.4 MG/1
0.8 CAPSULE ORAL AT BEDTIME
Refills: 0 | Status: DISCONTINUED | OUTPATIENT
Start: 2020-11-09 | End: 2020-11-11

## 2020-11-09 RX ORDER — FAMOTIDINE 10 MG/ML
20 INJECTION INTRAVENOUS DAILY
Refills: 0 | Status: DISCONTINUED | OUTPATIENT
Start: 2020-11-09 | End: 2020-11-11

## 2020-11-09 RX ORDER — L.ACIDOPH/B.ANIMALIS/B.LONGUM 15B CELL
0 CAPSULE ORAL
Qty: 0 | Refills: 0 | DISCHARGE

## 2020-11-09 RX ADMIN — Medication 5 MILLIGRAM(S): at 18:09

## 2020-11-09 RX ADMIN — FAMOTIDINE 20 MILLIGRAM(S): 10 INJECTION INTRAVENOUS at 18:08

## 2020-11-09 RX ADMIN — Medication 1 TABLET(S): at 18:09

## 2020-11-09 RX ADMIN — ESCITALOPRAM OXALATE 40 MILLIGRAM(S): 10 TABLET, FILM COATED ORAL at 18:07

## 2020-11-09 RX ADMIN — Medication 150 MILLIGRAM(S): at 18:37

## 2020-11-09 RX ADMIN — PANTOPRAZOLE SODIUM 40 MILLIGRAM(S): 20 TABLET, DELAYED RELEASE ORAL at 05:51

## 2020-11-09 RX ADMIN — APIXABAN 2.5 MILLIGRAM(S): 2.5 TABLET, FILM COATED ORAL at 18:14

## 2020-11-09 RX ADMIN — LEVETIRACETAM 500 MILLIGRAM(S): 250 TABLET, FILM COATED ORAL at 22:41

## 2020-11-09 RX ADMIN — POTASSIUM PHOSPHATE, MONOBASIC POTASSIUM PHOSPHATE, DIBASIC 83.33 MILLIMOLE(S): 236; 224 INJECTION, SOLUTION INTRAVENOUS at 08:07

## 2020-11-09 RX ADMIN — TAMSULOSIN HYDROCHLORIDE 0.8 MILLIGRAM(S): 0.4 CAPSULE ORAL at 22:41

## 2020-11-09 RX ADMIN — Medication 25 MILLILITER(S): at 11:37

## 2020-11-09 RX ADMIN — CHLORHEXIDINE GLUCONATE 1 APPLICATION(S): 213 SOLUTION TOPICAL at 05:51

## 2020-11-09 RX ADMIN — CEFTRIAXONE 100 MILLIGRAM(S): 500 INJECTION, POWDER, FOR SOLUTION INTRAMUSCULAR; INTRAVENOUS at 22:41

## 2020-11-09 RX ADMIN — VALACYCLOVIR 500 MILLIGRAM(S): 500 TABLET, FILM COATED ORAL at 18:08

## 2020-11-09 RX ADMIN — ENOXAPARIN SODIUM 40 MILLIGRAM(S): 100 INJECTION SUBCUTANEOUS at 05:51

## 2020-11-09 RX ADMIN — BUPROPION HYDROCHLORIDE 200 MILLIGRAM(S): 150 TABLET, EXTENDED RELEASE ORAL at 18:09

## 2020-11-09 RX ADMIN — CHLORHEXIDINE GLUCONATE 15 MILLILITER(S): 213 SOLUTION TOPICAL at 05:51

## 2020-11-09 NOTE — PROGRESS NOTE ADULT - SUBJECTIVE AND OBJECTIVE BOX
GASTROENTEROLOGY PROGRESS NOTE  Patient seen and examined at bedside. The patient had bowel movement today, endorses flatus and tolerates mechanical soft diet w/o nausea or vomiting. She was stepped down from SICU.    PERTINENT REVIEW OF SYSTEMS:  As noted above    Allergies    No Known Allergies    Intolerances      MEDICATIONS:  MEDICATIONS  (STANDING):  apixaban 2.5 milliGRAM(s) Oral every 12 hours  buPROPion  milliGRAM(s) Oral daily  cefTRIAXone   IVPB 2000 milliGRAM(s) IV Intermittent every 24 hours  chlorhexidine 2% Cloths 1 Application(s) Topical <User Schedule>  escitalopram 40 milliGRAM(s) Oral daily  famotidine    Tablet 20 milliGRAM(s) Oral daily  insulin lispro (ADMELOG) corrective regimen sliding scale   SubCutaneous every 6 hours  lactobacillus acidophilus 1 Tablet(s) Oral daily  oxybutynin 5 milliGRAM(s) Oral two times a day  pregabalin 150 milliGRAM(s) Oral two times a day  tamsulosin 0.8 milliGRAM(s) Oral at bedtime  valACYclovir 500 milliGRAM(s) Oral every 12 hours  vitamin B complex with vitamin C 1 Tablet(s) Oral daily    MEDICATIONS  (PRN):    Vital Signs Last 24 Hrs  T(C): 36.9 (2020 12:15), Max: 37.6 (2020 21:56)  T(F): 98.5 (2020 12:15), Max: 99.6 (2020 21:56)  HR: 107 (2020 12:15) (72 - 107)  BP: 120/77 (2020 12:15) (110/75 - 143/61)  BP(mean): 95 (2020 11:00) (82 - 99)  RR: 18 (2020 12:15) (10 - 20)  SpO2: 92% (2020 12:15) (92% - 97%)     @ 07:01  -   @ 07:00  --------------------------------------------------------  IN: 75.5 mL / OUT: 885 mL / NET: -809.5 mL     @ 07:01  -   @ 17:37  --------------------------------------------------------  IN: 255 mL / OUT: 140 mL / NET: 115 mL      PHYSICAL EXAM:    General: Well developed; well nourished; in no acute distress  HEENT: MMM, conjunctiva and sclera clear  Gastrointestinal: Soft non-tender non-distended; No rebound or guarding  Skin: Warm and dry. No obvious rash    LABS:                        13.4   8.26  )-----------( 197      ( 2020 05:51 )             41.8         145  |  109<H>  |  10  ----------------------------<  78  3.6   |  25  |  0.45<L>    Ca    8.6      2020 05:51  Phos  1.2       Mg     2.1         TPro  5.8<L>  /  Alb  2.9<L>  /  TBili  0.2  /  DBili  x   /  AST  18  /  ALT  11  /  AlkPhos  64      PT/INR - ( 2020 02:26 )   PT: 15.3 sec;   INR: 1.29          PTT - ( 2020 02:26 )  PTT:33.3 sec      Urinalysis Basic - ( 2020 21:39 )    Color: Yellow / Appearance: Clear / S.015 / pH: x  Gluc: x / Ketone: NEGATIVE  / Bili: Negative / Urobili: 0.2 E.U./dL   Blood: x / Protein: Trace mg/dL / Nitrite: NEGATIVE   Leuk Esterase: Moderate / RBC: < 5 /HPF / WBC Many /HPF   Sq Epi: x / Non Sq Epi: x / Bacteria: Many /HPF                Culture - Sputum (collected 2020 18:25)  Source: .Sputum Sputum  Gram Stain (2020 21:08):    No epithelial cells seen    Few WBC's    Few Yeast  Preliminary Report (2020 08:09):    Normal Respiratory Gloria present to date    Culture - Blood (collected 2020 23:52)  Source: .Blood Blood  Preliminary Report (2020 00:00):    No growth at 1 day.    Culture - Blood (collected 2020 23:52)  Source: .Blood Blood  Preliminary Report (2020 00:00):    No growth at 1 day.    Culture - Urine (collected 2020 21:58)  Source: .Urine Clean Catch (Midstream)  Final Report (2020 09:03):    90,000 CFU/ml Escherichia coli  Organism: Escherichia coli (2020 09:03)  Organism: Escherichia coli (2020 09:03)      RADIOLOGY & ADDITIONAL STUDIES:  Reviewed

## 2020-11-09 NOTE — PHYSICAL THERAPY INITIAL EVALUATION ADULT - PERTINENT HX OF CURRENT PROBLEM, REHAB EVAL
56 yo F with PMH of MS (dx age 35, bedbound, AOx3), intracranial bleed many years ago, HTN, presents after being found poorly responsive but breathing in her bed at home. She presents with her night HHA who has known her for 5 years. She says she arrived at 18:00 to begin her shift and the day HHA, who is new and does not know the patient very well, told her the patient had been "sleeping" for most of the day.

## 2020-11-09 NOTE — PROGRESS NOTE ADULT - SUBJECTIVE AND OBJECTIVE BOX
KIMBERLY NEGRO, 55y, Female  MRN-[id number]  Patient is a 55y old  Female who presents with a chief complaint of TME (2020 07:15)      OVERNIGHT EVENTS:     SUBJECTIVE:  -------------------------------------------------------------------------------  VITAL SIGNS:  Vital Signs Last 24 Hrs  T(C): 36.9 (2020 09:46), Max: 37.6 (2020 21:56)  T(F): 98.5 (2020 09:46), Max: 99.6 (2020 21:56)  HR: 94 (2020 11:00) (72 - 94)  BP: 129/72 (2020 11:00) (105/62 - 143/61)  BP(mean): 95 (2020 11:00) (78 - 99)  RR: 18 (2020 11:00) (10 - 20)  SpO2: 96% (2020 11:00) (92% - 100%)  Mode: standby    I&O's Summary    :  -  2020 07:00  --------------------------------------------------------  IN: 75.5 mL / OUT: 885 mL / NET: -809.5 mL    2020 07:  -  2020 11:37  --------------------------------------------------------  IN: 255 mL / OUT: 140 mL / NET: 115 mL        PHYSICAL EXAM:    General: NAD, well developed  HEENT: NC/AT; EOMI, PERRLA, anicteric sclera; moist mucosal membranes.  Neck: supple, trachea midline  Cardiovascular: RRR, +S1/S2; NO M/R/G  Respiratory: CTA B/L; no W/R/R  Gastrointestinal: soft, NT/ND; +BSx4  Extremities: WWP; no edema or cyanosis  Vascular: 2+ radial, DP/PT pulses B/L  Neurological: AAOx3; no focal deficits    ALLERGIES:  Allergies    No Known Allergies    Intolerances        MEDICATIONS:  MEDICATIONS  (STANDING):  apixaban 2.5 milliGRAM(s) Oral every 12 hours  cefTRIAXone   IVPB 2000 milliGRAM(s) IV Intermittent every 24 hours  chlorhexidine 2% Cloths 1 Application(s) Topical <User Schedule>  dextrose 50% Injectable 25 milliLiter(s) IV Push once  insulin lispro (ADMELOG) corrective regimen sliding scale   SubCutaneous every 6 hours    MEDICATIONS  (PRN):      -------------------------------------------------------------------------------  LABS:                        13.4   8.26  )-----------( 197      ( 2020 05:51 )             41.8     11-    145  |  109<H>  |  10  ----------------------------<  78  3.6   |  25  |  0.45<L>    Ca    8.6      2020 05:51  Phos  1.2       Mg     2.1         TPro  5.8<L>  /  Alb  2.9<L>  /  TBili  0.2  /  DBili  x   /  AST  18  /  ALT  11  /  AlkPhos  64  1109    LIVER FUNCTIONS - ( 2020 05:51 )  Alb: 2.9 g/dL / Pro: 5.8 g/dL / ALK PHOS: 64 U/L / ALT: 11 U/L / AST: 18 U/L / GGT: x           PT/INR - ( 2020 02:26 )   PT: 15.3 sec;   INR: 1.29          PTT - ( 2020 02:26 )  PTT:33.3 sec  ABG - ( 2020 04:36 )  pH, Arterial: 7.48  pH, Blood: x     /  pCO2: 38    /  pO2: 87    / HCO3: 27    / Base Excess: 3.7   /  SaO2: 97                Lactate, Blood: 0.9 mmol/L ( @ 05:51)  Lactate, Blood: 2.0 mmol/L ( @ 02:26)  Lactate, Blood: 5.3 mmol/L ( @ 22:55)  Lactate, Blood: 7.0 mmol/L ( @ 20:21)    CARDIAC MARKERS ( 2020 02:26 )  x     / x     / 110 U/L / x     / x      CARDIAC MARKERS ( 2020 23:01 )  x     / 0.02 ng/mL / x     / x     / x          Urinalysis Basic - ( 2020 21:39 )    Color: Yellow / Appearance: Clear / S.015 / pH: x  Gluc: x / Ketone: NEGATIVE  / Bili: Negative / Urobili: 0.2 E.U./dL   Blood: x / Protein: Trace mg/dL / Nitrite: NEGATIVE   Leuk Esterase: Moderate / RBC: < 5 /HPF / WBC Many /HPF   Sq Epi: x / Non Sq Epi: x / Bacteria: Many /HPF      CAPILLARY BLOOD GLUCOSE      POCT Blood Glucose.: 71 mg/dL (2020 11:31)  POCT Blood Glucose.: 76 mg/dL (2020 06:08)  POCT Blood Glucose.: 71 mg/dL (2020 22:53)  POCT Blood Glucose.: 88 mg/dL (2020 17:13)        Culture - Sputum (collected 2020 18:25)  Source: .Sputum Sputum  Gram Stain (2020 21:08):    No epithelial cells seen    Few WBC's    Few Yeast  Preliminary Report (2020 08:09):    Normal Respiratory Gloria present to date    Culture - Blood (collected 2020 23:52)  Source: .Blood Blood  Preliminary Report (2020 00:00):    No growth at 1 day.    Culture - Blood (collected 2020 23:52)  Source: .Blood Blood  Preliminary Report (2020 00:00):    No growth at 1 day.    Culture - Urine (collected 2020 21:58)  Source: .Urine Clean Catch (Midstream)  Final Report (2020 09:03):    90,000 CFU/ml Escherichia coli  Organism: Escherichia coli (2020 09:03)  Organism: Escherichia coli (2020 09:03)        RADIOLOGY & ADDITIONAL TESTS: Reviewed.   KIMBERLY REILLY, 55y, Female  MRN-[id number]  Patient is a 55y old  Female who presents with a chief complaint of TME (2020 07:15)  HOSPITAL COURSE: Ms. Reilly is a 56 yo F with PMH of MS (dx age 35, bedbound, AOx3), intracranial bleed many years ago, HTN, presents after being found poorly responsive but breathing in her bed at home.Pt found unresponsive at home and brought in by home health aid. in the past had episode of marijuana induced coma, with THC positive on Utox on this admission. ED was significant for an elefvated lactate to 7.0 which has since cleared and metabolic acidosis to 7.26 with concern for seizure and not being able to protect her airway with a resp rate of 6. She was intubated in the ED, given CTX/Flagyl, 3L NS, Keppra loaded 1g, and Narcan and sent to MICU.Her imaging was not significant for any stenosis or ICH. In the MICU she had vEEG placed and was taken off keppra and extubated. Patient with baseline confusion, Is A&Ox2 to self and hospital. Pt course c/b partial SBO/ileus with megaesophagus. NGT was unable to be placed 2/2 megaesophagus and tight lap band from previous bariatric surgery.    OVERNIGHT EVENTS: Had BM this AM. Moderate size, soft, brown.    SUBJECTIVE: Pt confused this AM. Knew she was in a hospital but thought she was in [place]. States she is feeling fine this AM.   -------------------------------------------------------------------------------  VITAL SIGNS:  Vital Signs Last 24 Hrs  T(C): 36.9 (2020 09:46), Max: 37.6 (2020 21:56)  T(F): 98.5 (2020 09:46), Max: 99.6 (2020 21:56)  HR: 94 (2020 11:00) (72 - 94)  BP: 129/72 (2020 11:00) (105/62 - 143/61)  BP(mean): 95 (2020 11:00) (78 - 99)  RR: 18 (2020 11:00) (10 - 20)  SpO2: 96% (2020 11:00) (92% - 100%)  Mode: standby    I&O's Summary    2020 07:01  -  2020 07:00  --------------------------------------------------------  IN: 75.5 mL / OUT: 885 mL / NET: -809.5 mL    2020 07:  -  2020 11:37  --------------------------------------------------------  IN: 255 mL / OUT: 140 mL / NET: 115 mL        PHYSICAL EXAM:    General: NAD, well developed female lying bed with EEG in place.  HEENT: NC/AT; EOMI, PERRL, anicteric sclera; moist mucosal membranes.  Neck: supple, trachea midline  Cardiovascular: RRR, +S1/S2; NO M/R/G  Respiratory: CTA B/L; no W/R/R  Gastrointestinal: soft, NT/ND; hypoactive bowel sounds  Extremities: WWP; no edema or cyanosis  Vascular: 2+ radial, DP/PT pulses B/L  Neurological: AAOx2. Neurological: AAOx1-2; + Babinksi bilaterally. + doll's eyes.+ sustained clonus on R foot, + 4-5 beats clonus on L foot.    ALLERGIES:  Allergies    No Known Allergies    Intolerances        MEDICATIONS:  MEDICATIONS  (STANDING):  apixaban 2.5 milliGRAM(s) Oral every 12 hours  cefTRIAXone   IVPB 2000 milliGRAM(s) IV Intermittent every 24 hours  chlorhexidine 2% Cloths 1 Application(s) Topical <User Schedule>  dextrose 50% Injectable 25 milliLiter(s) IV Push once  insulin lispro (ADMELOG) corrective regimen sliding scale   SubCutaneous every 6 hours    MEDICATIONS  (PRN):      -------------------------------------------------------------------------------  LABS:                        13.4   8.26  )-----------( 197      ( 2020 05:51 )             41.8         145  |  109<H>  |  10  ----------------------------<  78  3.6   |  25  |  0.45<L>    Ca    8.6      2020 05:51  Phos  1.2       Mg     2.1         TPro  5.8<L>  /  Alb  2.9<L>  /  TBili  0.2  /  DBili  x   /  AST  18  /  ALT  11  /  AlkPhos  64      LIVER FUNCTIONS - ( 2020 05:51 )  Alb: 2.9 g/dL / Pro: 5.8 g/dL / ALK PHOS: 64 U/L / ALT: 11 U/L / AST: 18 U/L / GGT: x           PT/INR - ( 2020 02:26 )   PT: 15.3 sec;   INR: 1.29          PTT - ( 2020 02:26 )  PTT:33.3 sec  ABG - ( 2020 04:36 )  pH, Arterial: 7.48  pH, Blood: x     /  pCO2: 38    /  pO2: 87    / HCO3: 27    / Base Excess: 3.7   /  SaO2: 97                Lactate, Blood: 0.9 mmol/L ( @ 05:51)  Lactate, Blood: 2.0 mmol/L ( @ 02:26)  Lactate, Blood: 5.3 mmol/L ( @ 22:55)  Lactate, Blood: 7.0 mmol/L ( @ 20:21)    CARDIAC MARKERS ( 2020 02:26 )  x     / x     / 110 U/L / x     / x      CARDIAC MARKERS ( 2020 23:01 )  x     / 0.02 ng/mL / x     / x     / x          Urinalysis Basic - ( 2020 21:39 )    Color: Yellow / Appearance: Clear / S.015 / pH: x  Gluc: x / Ketone: NEGATIVE  / Bili: Negative / Urobili: 0.2 E.U./dL   Blood: x / Protein: Trace mg/dL / Nitrite: NEGATIVE   Leuk Esterase: Moderate / RBC: < 5 /HPF / WBC Many /HPF   Sq Epi: x / Non Sq Epi: x / Bacteria: Many /HPF      CAPILLARY BLOOD GLUCOSE      POCT Blood Glucose.: 71 mg/dL (2020 11:31)  POCT Blood Glucose.: 76 mg/dL (2020 06:08)  POCT Blood Glucose.: 71 mg/dL (2020 22:53)  POCT Blood Glucose.: 88 mg/dL (2020 17:13)        Culture - Sputum (collected 2020 18:25)  Source: .Sputum Sputum  Gram Stain (2020 21:08):    No epithelial cells seen    Few WBC's    Few Yeast  Preliminary Report (2020 08:09):    Normal Respiratory Gloria present to date    Culture - Blood (collected 2020 23:52)  Source: .Blood Blood  Preliminary Report (2020 00:00):    No growth at 1 day.    Culture - Blood (collected 2020 23:52)  Source: .Blood Blood  Preliminary Report (2020 00:00):    No growth at 1 day.    Culture - Urine (collected 2020 21:58)  Source: .Urine Clean Catch (Midstream)  Final Report (2020 09:03):    90,000 CFU/ml Escherichia coli  Organism: Escherichia coli (2020 09:03)  Organism: Escherichia coli (2020 09:03)        RADIOLOGY & ADDITIONAL TESTS: Reviewed.   TRANSFER FROM MICU TO [facility]    KIMBERLY REILLY, 55y, Female  MRN-[id number]  Patient is a 55y old  Female who presents with a chief complaint of TME (2020 07:15)  HOSPITAL COURSE: Ms. Reilly is a 56 yo F with PMH of MS (dx age 35, bedbound, AOx3), intracranial bleed many years ago, HTN, presents after being found poorly responsive but breathing in her bed at home.Pt found unresponsive at home and brought in by home health aid. in the past had episode of marijuana induced coma, with THC positive on Utox on this admission. ED was significant for an elefvated lactate to 7.0 which has since cleared and metabolic acidosis to 7.26 with concern for seizure and not being able to protect her airway with a resp rate of 6. She was intubated in the ED, given CTX/Flagyl, 3L NS, Keppra loaded 1g, and Narcan and sent to MICU.Her imaging was not significant for any stenosis or ICH. In the MICU she had vEEG placed and was taken off keppra and extubated. Patient with baseline confusion, Is A&Ox2 to self and hospital. Pt course c/b partial SBO/ileus with megaesophagus. NGT was unable to be placed 2/2 megaesophagus and tight lap band from previous bariatric surgery.    OVERNIGHT EVENTS: Had BM this AM. Moderate size, soft, brown.    SUBJECTIVE: Pt confused this AM. Knew she was in a hospital but thought she was in [place]. States she is feeling fine this AM.   -------------------------------------------------------------------------------  VITAL SIGNS:  Vital Signs Last 24 Hrs  T(C): 36.9 (2020 09:46), Max: 37.6 (2020 21:56)  T(F): 98.5 (2020 09:46), Max: 99.6 (2020 21:56)  HR: 94 (2020 11:00) (72 - 94)  BP: 129/72 (2020 11:00) (105/62 - 143/61)  BP(mean): 95 (2020 11:00) (78 - 99)  RR: 18 (2020 11:00) (10 - 20)  SpO2: 96% (2020 11:00) (92% - 100%)  Mode: standby    I&O's Summary    2020 07:01  -  2020 07:00  --------------------------------------------------------  IN: 75.5 mL / OUT: 885 mL / NET: -809.5 mL    2020 07:01  -  2020 11:37  --------------------------------------------------------  IN: 255 mL / OUT: 140 mL / NET: 115 mL        PHYSICAL EXAM:    General: NAD, well developed female lying bed with EEG in place.  HEENT: NC/AT; EOMI, PERRL, anicteric sclera; moist mucosal membranes.  Neck: supple, trachea midline  Cardiovascular: RRR, +S1/S2; NO M/R/G  Respiratory: CTA B/L; no W/R/R  Gastrointestinal: soft, NT/ND; hypoactive bowel sounds  Extremities: WWP; no edema or cyanosis  Vascular: 2+ radial, DP/PT pulses B/L  Neurological: AAOx2. Neurological: AAOx1-2; + Babinksi bilaterally. + doll's eyes.+ sustained clonus on R foot, + 4-5 beats clonus on L foot.    ALLERGIES:  Allergies    No Known Allergies    Intolerances        MEDICATIONS:  MEDICATIONS  (STANDING):  apixaban 2.5 milliGRAM(s) Oral every 12 hours  cefTRIAXone   IVPB 2000 milliGRAM(s) IV Intermittent every 24 hours  chlorhexidine 2% Cloths 1 Application(s) Topical <User Schedule>  dextrose 50% Injectable 25 milliLiter(s) IV Push once  insulin lispro (ADMELOG) corrective regimen sliding scale   SubCutaneous every 6 hours    MEDICATIONS  (PRN):      -------------------------------------------------------------------------------  LABS:                        13.4   8.26  )-----------( 197      ( 2020 05:51 )             41.8         145  |  109<H>  |  10  ----------------------------<  78  3.6   |  25  |  0.45<L>    Ca    8.6      2020 05:51  Phos  1.2       Mg     2.1         TPro  5.8<L>  /  Alb  2.9<L>  /  TBili  0.2  /  DBili  x   /  AST  18  /  ALT  11  /  AlkPhos  64      LIVER FUNCTIONS - ( 2020 05:51 )  Alb: 2.9 g/dL / Pro: 5.8 g/dL / ALK PHOS: 64 U/L / ALT: 11 U/L / AST: 18 U/L / GGT: x           PT/INR - ( 2020 02:26 )   PT: 15.3 sec;   INR: 1.29          PTT - ( 2020 02:26 )  PTT:33.3 sec  ABG - ( 2020 04:36 )  pH, Arterial: 7.48  pH, Blood: x     /  pCO2: 38    /  pO2: 87    / HCO3: 27    / Base Excess: 3.7   /  SaO2: 97                Lactate, Blood: 0.9 mmol/L ( @ 05:51)  Lactate, Blood: 2.0 mmol/L ( @ 02:26)  Lactate, Blood: 5.3 mmol/L ( @ 22:55)  Lactate, Blood: 7.0 mmol/L ( @ 20:21)    CARDIAC MARKERS ( 2020 02:26 )  x     / x     / 110 U/L / x     / x      CARDIAC MARKERS ( 2020 23:01 )  x     / 0.02 ng/mL / x     / x     / x          Urinalysis Basic - ( 2020 21:39 )    Color: Yellow / Appearance: Clear / S.015 / pH: x  Gluc: x / Ketone: NEGATIVE  / Bili: Negative / Urobili: 0.2 E.U./dL   Blood: x / Protein: Trace mg/dL / Nitrite: NEGATIVE   Leuk Esterase: Moderate / RBC: < 5 /HPF / WBC Many /HPF   Sq Epi: x / Non Sq Epi: x / Bacteria: Many /HPF      CAPILLARY BLOOD GLUCOSE      POCT Blood Glucose.: 71 mg/dL (2020 11:31)  POCT Blood Glucose.: 76 mg/dL (2020 06:08)  POCT Blood Glucose.: 71 mg/dL (2020 22:53)  POCT Blood Glucose.: 88 mg/dL (2020 17:13)        Culture - Sputum (collected 2020 18:25)  Source: .Sputum Sputum  Gram Stain (2020 21:08):    No epithelial cells seen    Few WBC's    Few Yeast  Preliminary Report (2020 08:09):    Normal Respiratory Gloria present to date    Culture - Blood (collected 2020 23:52)  Source: .Blood Blood  Preliminary Report (2020 00:00):    No growth at 1 day.    Culture - Blood (collected 2020 23:52)  Source: .Blood Blood  Preliminary Report (2020 00:00):    No growth at 1 day.    Culture - Urine (collected 2020 21:58)  Source: .Urine Clean Catch (Midstream)  Final Report (2020 09:03):    90,000 CFU/ml Escherichia coli  Organism: Escherichia coli (2020 09:03)  Organism: Escherichia coli (2020 09:03)        RADIOLOGY & ADDITIONAL TESTS: Reviewed.

## 2020-11-09 NOTE — PROGRESS NOTE ADULT - ASSESSMENT
Ms. Reilly is a 56 yo F with PMH of MS, previous ICH, and toxin induced coma previously who presents with coma, CT without new acute findings but notable for significant gliosis and encephalomalacia; given stated history of seizure/coma in setting of cannabis use and otherwise isolated lactic acidosis clinical suspicion for seizure is high. UTI noted may have precipitated seizure.    Neurology  #r/o Coma vs seizure. Previously was on propofol and intubated. No longer intubated and not on propofol. A&Ox2 this morning. Per step brother she has waxing and waning cognition at baseline.   - vEEG placed. f/u read.  - epilepsy and gen neurology consulted, f/u reccs    CARDIOVASCULAR  #HTN.   - holding home verapamil, restart PRN as BP requires    PULMONARY  #Acute Respiratory Failure 2/2 airway protection. Pt intubated overnight with concern for poor airway protection.  - Able to protect airway currently and is saturating well on room air.     GASTROINTESTINAL  # Partial Ileus or SBO. As patient has very dilated loops of bowel and small bowel with fluid within it. However pt did have vomitus and BMs in ED. Pt also had BM this morning. NGT was never placed but pt seems to be tolerating well without it.   - Speech and swallow consult for restarting diet.  - GI, bariatric surgery, and surgery consulted for lap band deflation and possible NGT placement yesterday but wasn't able to be placed.  - Requires follow up with GI for possible scope 2/2 megaesophagus    RENAL  - No acute issues. Is urinating fine.   - dover removed.    RHEUM  # MS  - holding home antidepressants while patient is NPO. Can restart once evaluated by speech and swallow.  - proper med recc required.      # Spastic bladder  - can restart home bladder spasm medications now that she does not have dover.    INFECTIOUS DISEASE  # UTI 2/2 EColi  In spite of lactic acidosis only meets 1/4 SIRS so not consistent with sepsis. Note that leukocytosis poss  after patient received Solu-medrol in ED, not secondary to infection.  - CTX 2 g q24h (11/7 - ) for EColi treatment    ENDOCRINE  - LDSSI     HEME   # Chronic anticoagulation  - was previously on eliquis 2.5 bid for DVT in the past, but per step brother she had dopplers last month that showed no DVT present.  - c/w eliquis 2.5 BID    FLUIDS/ELECTROLYTES/NUTRITION  -IVF: s/p 3L NS in ED. Currently not on fluids.  -Monitor, Replete to K>4 and Mg>2  -Diet: NPO, speech and swallow consult.  PROPHYLAXIS  -DVT: Eliquis 2.5  -GI: none    DISPO: MICU  CODE STATUS: FULL CODE

## 2020-11-09 NOTE — PHYSICAL THERAPY INITIAL EVALUATION ADULT - ADDITIONAL COMMENTS
Pt lives in an elevator apartment with 24/7 HHA, per pt she is unable to ambulate or stand, pt reports she is able to dangle EOB independently.

## 2020-11-09 NOTE — PROGRESS NOTE ADULT - SUBJECTIVE AND OBJECTIVE BOX
SUBJECTIVE: Overnight there were no significant events, this morning the patient is feeling well. She was examined at the bedside by the chief resident. The patient denies having chest pain, SOB, nausea, vomiting, dizziness, chills. She did not have any bowel movements overnight, but endorses flatus.      Vital Signs Last 24 Hrs  T(C): 36.7 (2020 01:06), Max: 37.6 (2020 21:56)  T(F): 98.1 (2020 01:06), Max: 99.6 (2020 21:56)  HR: 92 (:00) (61 - 93)  BP: 123/66 (:00) (105/58 - 147/76)  BP(mean): 89 (:) (76 - 102)  RR: 18 (:00) (10 - 20)  SpO2: 94% (:00) (92% - 100%)    Physical Exam:  General: NAD, resting comfortably in the bed  Pulmonary: Nonlabored breathing, no respiratory distress  Abdominal: soft, NT, less distention no rebound tenderness, guarding, rigidity  Extremities: WWP, normal strength, no clubbing/cyanosis/edema  Neuro: AAOx3    Lines/drains/tubes:    I&O's Summary    2020 07:01  -  2020 07:00  --------------------------------------------------------  IN: 75.5 mL / OUT: 825 mL / NET: -749.5 mL        LABS:                        13.4   8.26  )-----------( 197      ( 2020 05:51 )             41.8     11    145  |  109<H>  |  10  ----------------------------<  78  3.6   |  25  |  0.45<L>    Ca    8.6      2020 05:51  Phos  1.2       Mg     2.1         TPro  5.8<L>  /  Alb  2.9<L>  /  TBili  0.2  /  DBili  x   /  AST  18  /  ALT  11  /  AlkPhos  64  11-09    PT/INR - ( 2020 02:26 )   PT: 15.3 sec;   INR: 1.29          PTT - ( 2020 02:26 )  PTT:33.3 sec  Urinalysis Basic - ( 2020 21:39 )    Color: Yellow / Appearance: Clear / S.015 / pH: x  Gluc: x / Ketone: NEGATIVE  / Bili: Negative / Urobili: 0.2 E.U./dL   Blood: x / Protein: Trace mg/dL / Nitrite: NEGATIVE   Leuk Esterase: Moderate / RBC: < 5 /HPF / WBC Many /HPF   Sq Epi: x / Non Sq Epi: x / Bacteria: Many /HPF      CAPILLARY BLOOD GLUCOSE      POCT Blood Glucose.: 76 mg/dL (2020 06:08)  POCT Blood Glucose.: 71 mg/dL (2020 22:53)  POCT Blood Glucose.: 88 mg/dL (2020 17:13)  POCT Blood Glucose.: 105 mg/dL (2020 11:35)    LIVER FUNCTIONS - ( 2020 05:51 )  Alb: 2.9 g/dL / Pro: 5.8 g/dL / ALK PHOS: 64 U/L / ALT: 11 U/L / AST: 18 U/L / GGT: x             RADIOLOGY & ADDITIONAL STUDIES:

## 2020-11-09 NOTE — PROGRESS NOTE ADULT - ASSESSMENT
56 yo F w/ PMH of MS  was found unresponsive at home, intubated on arrival, code stroke, CT now signs of ICH. Lactate was 7 improved to 2. CT scan showed high grade SBO w/ transition point in RLQ. The patient had bowel movement today, endorses flatus and tolerates mechanical soft diet w/o nausea or vomiting. She was stepped down from SICU.      - Team 2 is signing off  - Please reconsult if needed  - Please mention in discharge summary that fluid from gastric band was removed  - Discussed w/ chief resident and Dr. Ascencio

## 2020-11-09 NOTE — SWALLOW BEDSIDE ASSESSMENT ADULT - SPECIFY REASON(S)
54 yo F with PMH including MS (bedbound), found unresponsive and was intubated on admission on 11/7.  HCT negative for acute ICH or infract.  Pt extubated 11/8.  CT abdomen also showed high grade SBO

## 2020-11-09 NOTE — PHYSICAL THERAPY INITIAL EVALUATION ADULT - GENERAL OBSERVATIONS, REHAB EVAL
Pt received supine with HOB elevated in NAD all lines in tact, call bell in reach, cleared for PT Evaluation by RN.

## 2020-11-09 NOTE — PROGRESS NOTE ADULT - SUBJECTIVE AND OBJECTIVE BOX
SUBJECTIVE: She was examined at the bedside by the chief resident. The patient denies having chest pain, SOB, nausea, vomiting, dizziness, chills. She had one bowel movement during the day and endorses flatus. The patient tolerates mechanical soft diet w/o nausea or vomiting. She was stepped down from SICU.    Vital Signs Last 24 Hrs  T(C): 36.9 (2020 12:15), Max: 37.6 (2020 21:56)  T(F): 98.5 (2020 12:15), Max: 99.6 (2020 21:56)  HR: 107 (2020 12:15) (72 - 107)  BP: 120/77 (:15) (105/62 - 143/61)  BP(mean): 95 (2020 11:00) (78 - 99)  RR: 18 (:15) (10 - 20)  SpO2: 92% (:15) (92% - 100%)      Physical Exam:  General: NAD, resting comfortably in the bed  Pulmonary: Nonlabored breathing, no respiratory distress  Abdominal: soft, NT, less distention no rebound tenderness, guarding, rigidity  Extremities: WWP, normal strength, no clubbing/cyanosis/edema  Neuro: AAOx3    Lines/drains/tubes:    I&O's Summary    2020 07:01  -  2020 07:00  --------------------------------------------------------  IN: 75.5 mL / OUT: 885 mL / NET: -809.5 mL    2020 07:01  -  2020 16:31  --------------------------------------------------------  IN: 255 mL / OUT: 140 mL / NET: 115 mL        LABS:                        13.4   8.26  )-----------( 197      ( 2020 05:51 )             41.8     11-    145  |  109<H>  |  10  ----------------------------<  78  3.6   |  25  |  0.45<L>    Ca    8.6      2020 05:51  Phos  1.2       Mg     2.1         TPro  5.8<L>  /  Alb  2.9<L>  /  TBili  0.2  /  DBili  x   /  AST  18  /  ALT  11  /  AlkPhos  64      PT/INR - ( 2020 02:26 )   PT: 15.3 sec;   INR: 1.29          PTT - ( 2020 02:26 )  PTT:33.3 sec  Urinalysis Basic - ( 2020 21:39 )    Color: Yellow / Appearance: Clear / S.015 / pH: x  Gluc: x / Ketone: NEGATIVE  / Bili: Negative / Urobili: 0.2 E.U./dL   Blood: x / Protein: Trace mg/dL / Nitrite: NEGATIVE   Leuk Esterase: Moderate / RBC: < 5 /HPF / WBC Many /HPF   Sq Epi: x / Non Sq Epi: x / Bacteria: Many /HPF      CAPILLARY BLOOD GLUCOSE      POCT Blood Glucose.: 181 mg/dL (2020 11:45)  POCT Blood Glucose.: 71 mg/dL (2020 11:31)  POCT Blood Glucose.: 76 mg/dL (2020 06:08)  POCT Blood Glucose.: 71 mg/dL (2020 22:53)  POCT Blood Glucose.: 88 mg/dL (2020 17:13)    LIVER FUNCTIONS - ( 2020 05:51 )  Alb: 2.9 g/dL / Pro: 5.8 g/dL / ALK PHOS: 64 U/L / ALT: 11 U/L / AST: 18 U/L / GGT: x             RADIOLOGY & ADDITIONAL STUDIES:

## 2020-11-09 NOTE — PROGRESS NOTE ADULT - SUBJECTIVE AND OBJECTIVE BOX
NEUROLOGY CONSULT PROGRESS NOTE    INTERVAL HISTORY: Patient seen and examined at bedside. She appears comfortable and in no acute distress. She is alert and able to answer questions and follow commands. She denies any complaints or concerns at the moment and says she is feeling well. When asked why she is in the hospital, she says she doesn't know.    MEDICATIONS:  apixaban 2.5 milliGRAM(s) Oral every 12 hours  cefTRIAXone   IVPB 2000 milliGRAM(s) IV Intermittent every 24 hours  chlorhexidine 2% Cloths 1 Application(s) Topical <User Schedule>  insulin lispro (ADMELOG) corrective regimen sliding scale   SubCutaneous every 6 hours    VITAL SIGNS:  Vital Signs Last 24 Hrs  T(C): 36.9 (2020 12:15), Max: 37.6 (2020 21:56)  T(F): 98.5 (2020 12:15), Max: 99.6 (2020 21:56)  HR: 107 (2020 12:15) (72 - 107)  BP: 120/77 (2020 12:15) (105/62 - 143/61)  BP(mean): 95 (2020 11:00) (78 - 99)  RR: 18 (2020 12:15) (10 - 20)  SpO2: 92% (2020 12:15) (92% - 100%)    PHYSICAL EXAMINATION:  Constitutional: WDWN; NAD  Eyes: anicteric sclera  Cardiovascular: +S1/S2, RRR; no M/R/G  Neurologic:  - AAOx1 (person), thought she was in [facility] and that year was . Speech is mild dysarthria and word difficulty (as per baseline). Face symmetric. EOMI. 5/5 strength in upper extremities bilaterally. Can wiggle toes, but unable to lift at hips (chronic and bedbound at baseline). Can follow simple commands but trouble with complex.    LABS:                          13.4   8.26  )-----------( 197      ( 2020 05:51 )             41.8         145  |  109<H>  |  10  ----------------------------<  78  3.6   |  25  |  0.45<L>    Ca    8.6      2020 05:51  Phos  1.2       Mg     2.1         TPro  5.8<L>  /  Alb  2.9<L>  /  TBili  0.2  /  DBili  x   /  AST  18  /  ALT  11  /  AlkPhos  64      PT/INR - ( 2020 02:26 )   PT: 15.3 sec;   INR: 1.29          PTT - ( 2020 02:26 )  PTT:33.3 sec  Urinalysis Basic - ( 2020 21:39 )    Color: Yellow / Appearance: Clear / S.015 / pH: x  Gluc: x / Ketone: NEGATIVE  / Bili: Negative / Urobili: 0.2 E.U./dL   Blood: x / Protein: Trace mg/dL / Nitrite: NEGATIVE   Leuk Esterase: Moderate / RBC: < 5 /HPF / WBC Many /HPF   Sq Epi: x / Non Sq Epi: x / Bacteria: Many /HPF        RADIOLOGY & ADDITIONAL STUDIES:      IMPRESSION & RECOMMENDATIONS: NEUROLOGY CONSULT PROGRESS NOTE    INTERVAL HISTORY: Patient seen and examined at bedside. She appears comfortable and in no acute distress. She is alert and able to answer questions and follow commands. She denies any complaints or concerns at the moment and says she is feeling well. When asked why she is in the hospital, she says she doesn't know.    MEDICATIONS:  apixaban 2.5 milliGRAM(s) Oral every 12 hours  cefTRIAXone   IVPB 2000 milliGRAM(s) IV Intermittent every 24 hours  chlorhexidine 2% Cloths 1 Application(s) Topical <User Schedule>  insulin lispro (ADMELOG) corrective regimen sliding scale   SubCutaneous every 6 hours    VITAL SIGNS:  Vital Signs Last 24 Hrs  T(C): 36.9 (2020 12:15), Max: 37.6 (2020 21:56)  T(F): 98.5 (2020 12:15), Max: 99.6 (2020 21:56)  HR: 107 (2020 12:15) (72 - 107)  BP: 120/77 (2020 12:15) (105/62 - 143/61)  BP(mean): 95 (2020 11:00) (78 - 99)  RR: 18 (2020 12:15) (10 - 20)  SpO2: 92% (2020 12:15) (92% - 100%)    PHYSICAL EXAMINATION:  Constitutional: WDWN; NAD  Neurologic:  - MS: Anders (person), thought she was in [facility] and that year was 0.  Follows simple but not complex commands.  - CN: pupils equal, tracks in all directions, face symmetric, tongue midline    LABS:                          13.4   8.26  )-----------( 197      ( 2020 05:51 )             41.8     11-    145  |  109<H>  |  10  ----------------------------<  78  3.6   |  25  |  0.45<L>    Ca    8.6      2020 05:51  Phos  1.2     11-  Mg     2.1     -    TPro  5.8<L>  /  Alb  2.9<L>  /  TBili  0.2  /  DBili  x   /  AST  18  /  ALT  11  /  AlkPhos  64  -    PT/INR - ( 2020 02:26 )   PT: 15.3 sec;   INR: 1.29          PTT - ( 2020 02:26 )  PTT:33.3 sec  Urinalysis Basic - ( 2020 21:39 )    Color: Yellow / Appearance: Clear / S.015 / pH: x  Gluc: x / Ketone: NEGATIVE  / Bili: Negative / Urobili: 0.2 E.U./dL   Blood: x / Protein: Trace mg/dL / Nitrite: NEGATIVE   Leuk Esterase: Moderate / RBC: < 5 /HPF / WBC Many /HPF   Sq Epi: x / Non Sq Epi: x / Bacteria: Many /HPF    EEG independently viewed, R hemisphere breach, frequent right temporal epileptiform discharges, not periodic, no seizures     NEUROLOGY CONSULT PROGRESS NOTE    INTERVAL HISTORY: Patient seen and examined at bedside. She appears comfortable and in no acute distress. She is alert and able to answer questions and follow commands. She denies any complaints or concerns at the moment and says she is feeling well. When asked why she is in the hospital, she says she doesn't know.    MEDICATIONS:  apixaban 2.5 milliGRAM(s) Oral every 12 hours  cefTRIAXone   IVPB 2000 milliGRAM(s) IV Intermittent every 24 hours  chlorhexidine 2% Cloths 1 Application(s) Topical <User Schedule>  insulin lispro (ADMELOG) corrective regimen sliding scale   SubCutaneous every 6 hours    VITAL SIGNS:  Vital Signs Last 24 Hrs  T(C): 36.9 (2020 12:15), Max: 37.6 (2020 21:56)  T(F): 98.5 (2020 12:15), Max: 99.6 (2020 21:56)  HR: 107 (2020 12:15) (72 - 107)  BP: 120/77 (2020 12:15) (105/62 - 143/61)  BP(mean): 95 (2020 11:00) (78 - 99)  RR: 18 (2020 12:15) (10 - 20)  SpO2: 92% (2020 12:15) (92% - 100%)    PHYSICAL EXAMINATION:  Constitutional: WDWN; NAD  Neurologic:  - MS: Anders (person), thought she was in [facility] and that year was 0.  Follows simple but not complex commands.  - CN: pupils equal, tracks in all directions, face symmetric, tongue midline    LABS:                          13.4   8.26  )-----------( 197      ( 2020 05:51 )             41.8     11-    145  |  109<H>  |  10  ----------------------------<  78  3.6   |  25  |  0.45<L>    Ca    8.6      2020 05:51  Phos  1.2     11-  Mg     2.1     -    TPro  5.8<L>  /  Alb  2.9<L>  /  TBili  0.2  /  DBili  x   /  AST  18  /  ALT  11  /  AlkPhos  64      PT/INR - ( 2020 02:26 )   PT: 15.3 sec;   INR: 1.29          PTT - ( 2020 02:26 )  PTT:33.3 sec  Urinalysis Basic - ( 2020 21:39 )    Color: Yellow / Appearance: Clear / S.015 / pH: x  Gluc: x / Ketone: NEGATIVE  / Bili: Negative / Urobili: 0.2 E.U./dL   Blood: x / Protein: Trace mg/dL / Nitrite: NEGATIVE   Leuk Esterase: Moderate / RBC: < 5 /HPF / WBC Many /HPF   Sq Epi: x / Non Sq Epi: x / Bacteria: Many /HPF    CT/CTA/CTP  unremarkable    EEG  independently viewed, R hemisphere breach, frequent right temporal epileptiform discharges, not periodic, no seizures

## 2020-11-09 NOTE — PHYSICAL THERAPY INITIAL EVALUATION ADULT - CRITERIA FOR SKILLED THERAPEUTIC INTERVENTIONS
anticipated discharge recommendation/risk reduction/prevention/rehab potential/impairments found/functional limitations in following categories/predicted duration of therapy intervention/therapy frequency/anticipated equipment needs at discharge

## 2020-11-09 NOTE — PROGRESS NOTE ADULT - ASSESSMENT
54 yo F with PMH of MS (dx age 35, bedbound, AOx3), intracranial bleed many years ago, HTN, gastric band, presents after being found poorly responsive but breathing in her bed at home. Pt was intubated on arrival for airway protection. UA grossly positive for UTI. CT abd with small bowel obstruction with transition point distal ileum, likely due to adhesions and Long segment of rectal wall thickening. Team had difficulty placing the NGT for decompression with looping in the esophagus. GI was consulted for help with possible EGD guided NGT (Sump) placement.     # partial SBO - resolved  likely 2/2 adhesion and h/o MS  The patient had bowel movement today, endorses flatus and tolerates mechanical soft diet w/o nausea or vomiting.  - no indication for endoscopic NG tube placement at this time  - bowel regimen  - continue plan per primary team  - will sign off. call back PRN    Recommendations discussed with primary team  Plan discussed with GI service attending    Brian Ocampo MD  PGY-4 GI fellow  Pager: [phone]

## 2020-11-09 NOTE — PHYSICAL THERAPY INITIAL EVALUATION ADULT - IMPAIRMENTS FOUND, PT EVAL
gait, locomotion, and balance/integumentary integrity/aerobic capacity/endurance/ergonomics and body mechanics/cognitive impairment/muscle strength/poor safety awareness/gross motor/neuromotor development and sensory integration/joint integrity and mobility/ROM

## 2020-11-09 NOTE — SWALLOW BEDSIDE ASSESSMENT ADULT - SLP GENERAL OBSERVATIONS
Patient received sleeping in bed, easily roused to voice.  (+) EEG monitoring ongoing.  Pt seemed mildly lethargic.  She followed basic commands and responded appropriately to basic questions, although with perseveration at times.

## 2020-11-09 NOTE — PROGRESS NOTE ADULT - ASSESSMENT
Ms. Reilly is a 54 yo F with PMH of MS, previous ICH, and toxin induced coma previously who presents with coma, CT without new acute findings but notable for significant gliosis and encephalomalacia; given stated history of seizure/coma in setting of cannabis use and otherwise isolated lactic acidosis clinical suspicion for seizure is high. UTI noted may have precipitated seizure.    Neurology  #r/o Coma vs seizure. Previously was on propofol and intubated. No longer intubated and not on propofol. A&Ox2 this morning. Per step brother she has waxing and waning cognition at baseline.   - vEEG placed. f/u read.  - epilepsy and gen neurology following, recs appreciated    CARDIOVASCULAR  #HTN.   - holding home verapamil, restart PRN as BP requires    PULMONARY  #Acute Respiratory Failure 2/2 airway protection. Pt intubated overnight with concern for poor airway protection.  - Able to protect airway currently and is saturating well on room air.     GASTROINTESTINAL  # Partial Ileus or SBO. As patient has very dilated loops of bowel and small bowel with fluid within it. However pt did have vomitus and BMs in ED. Pt also had BM this morning. NGT was never placed but pt seems to be tolerating well without it.   - Speech and swallow consult for restarting diet, rec mechanical soft with thin liquids  - GI, bariatric surgery, and surgery consulted for lap band deflation and possible NGT placement yesterday but wasn't able to be placed, NTD, recs appreciated      RENAL  - No acute issues. Is urinating fine.   - dover removed.    RHEUM  # MS  - holding home antidepressants while patient is NPO. Can restart once evaluated by speech and swallow.  - c/w welbutrin      # Spastic bladder  - restarted home bladder spasm medications now that she does not have dover.    INFECTIOUS DISEASE  # UTI 2/2 EColi  In spite of lactic acidosis only meets 1/4 SIRS so not consistent with sepsis. Note that leukocytosis poss  after patient received Solu-medrol in ED, not secondary to infection.  - CTX 2 g q24h (11/7 - ) for EColi treatment    ENDOCRINE  - LDSSI     HEME   # Chronic anticoagulation  - was previously on eliquis 2.5 bid for DVT in the past, but per step brother she had dopplers last month that showed no DVT present.  - c/w eliquis 2.5 BID    FLUIDS/ELECTROLYTES/NUTRITION  -IVF: s/p 3L NS in ED. Currently not on fluids.  -Monitor, Replete to K>4 and Mg>2  -Diet: NPO, speech and swallow consult.  PROPHYLAXIS  -DVT: Eliquis 2.5  -GI: none    DISPO: MICU  CODE STATUS: FULL CODE

## 2020-11-09 NOTE — PHYSICAL THERAPY INITIAL EVALUATION ADULT - BALANCE DISTURBANCE, IDENTIFIED IMPAIRMENT CONTRIBUTE, REHAB EVAL
decreased ROM/impaired motor control/abnormal muscle tone/decreased strength/impaired coordination/impaired postural control

## 2020-11-09 NOTE — SWALLOW BEDSIDE ASSESSMENT ADULT - SWALLOW EVAL: RECOMMENDED FEEDING/EATING TECHNIQUES
oral hygiene/allow for swallow between intakes/position upright (90 degrees)/small sips/bites/check mouth frequently for oral residue/pocketing/maintain upright posture during/after eating for 30 mins

## 2020-11-09 NOTE — PHYSICAL THERAPY INITIAL EVALUATION ADULT - RANGE OF MOTION EXAMINATION, REHAB EVAL
bilateral upper extremity ROM was WFL (within functional limits)/BLE knee rom grossly 10-30 limited by tone and weakness

## 2020-11-09 NOTE — PROGRESS NOTE ADULT - ASSESSMENT
55F PMH of MS (dx age 35, bedbound, AOx3), intracranial bleed many years ago, HTN, presents after being found poorly responsive but breathing in her bed at home. She presents with her night HHA who has known her for 5 years. She says she arrived at 18:00 to begin her shift and the day HHA, who is new and does not know the patient very well, told her the patient had been "sleeping" for most of the day. The night HHA tried to rouse patient including applying ice to her face and when she did not rouse called 911. 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 HCP is her stepbrother Fransico Mckay [phone]. ED COURSE: Patient BIBEMS and had to be intubated on arrival as she was breathing at approx 5/min and not protecting airway. ED Vitals 97.9 (rectal), 92, 112/83, 6, 95% on 4L. NIHSS 30 on arrival, no new CVA or ICH on CTH/CTA/CTP. Seen by stroke team, deemed no stroke. Admitted to ICU. Extubated on 11/8.    Improving obtundation, likely toxic metabolic related to either UTI or meds/drug. AAOX1 (person) on exam with slowed speech (baseline AOx3). Speech dysfunction could be related to overall global encephalopathy affecting someone with imperfect neurological baseline.    Recommendations:  - f/u vEEG  - treat underlying UTI as per primary team    Plan discussed with Dr. Espinal. Neurology will continue to follow.       55-year-old woman with MS and remote intracranial hemorrhage p/w unresponsiveness requiring intubation, now extubated and on antibiotics for urosepsis.  She is still quite encephalopathic, possibly 2/2 resolving UTI, though EEG also shows frequent epileptiform discharges, indicating that she may have had a seizure, and is at risk for seizures.      Recommendations:  - continue EEG  - Keppra 500 mg BID  - treat underlying UTI as per primary team    Plan discussed with Dr. Espinal. Neurology will continue to follow.

## 2020-11-09 NOTE — PHYSICAL THERAPY INITIAL EVALUATION ADULT - TRANSFER SKILLS, REHAB EVAL
EMERGENCY DEPARTMENT HISTORY AND PHYSICAL EXAM    Date: 5/26/2018  Patient Name: Hubert Owusu    History of Presenting Illness     Chief Complaint   Patient presents with    Fall    Knee Injury         History Provided By: Patient    Chief Complaint: Bilateral leg pain  Duration: 30 Minutes  Timing:  Acute  Location: Bilateral knees radiating to her thighs  Quality: Aching  Severity: 8 out of 10  Modifying Factors: Pain is exacerbated with movement. Associated Symptoms: HA and right hand pain    Additional History (Context):   4:23 PM  Hubert Owusu is a 59 y.o. female with PMHX of diabetes, HTN and liver disease who presents to the emergency department C/O aching bilateral knee pain (8/10) radiating to her thighs onset 30 minutes ago. Associated sxs include HA and right hand pain. Pain is exacerbated with movement. Pt reports her grandson was pushing her in a wheelchair down a slop when he lost control and she fell onto her knees and upper body. Pt reports she hit the left side of her face. Pt is able to ambulate but uses a wheelchair for long distances. NKDA. Pt denies LOC, and any other sxs or complaints. PCP: Sonali Reza MD    Current Outpatient Prescriptions   Medication Sig Dispense Refill    oxyCODONE-acetaminophen (PERCOCET) 5-325 mg per tablet Take 1 Tab by mouth every four (4) hours as needed for Pain. Max Daily Amount: 6 Tabs. 12 Tab 0    spironolactone (ALDACTONE) 100 mg tablet TAKE 1 TABLET EVERY DAY 90 Tab 3    atorvastatin (LIPITOR) 10 mg tablet Take  by mouth daily.  tacrolimus (PROGRAF) 1 mg capsule Take 1 Cap by mouth every twelve (12) hours. Indications: autoimmune hepatitis 60 Cap 3    amLODIPine (NORVASC) 2.5 mg tablet       LANTUS SOLOSTAR 100 unit/mL (3 mL) inpn 26 Units daily.  furosemide (LASIX) 40 mg tablet Take 1 Tab by mouth daily.  80 Tab 3       Past History     Past Medical History:  Past Medical History:   Diagnosis Date    Diabetes ([address]     Hypertension  Liver disease        Past Surgical History:  History reviewed. No pertinent surgical history. Family History:  History reviewed. No pertinent family history. Social History:  Social History   Substance Use Topics    Smoking status: Never Smoker    Smokeless tobacco: Never Used    Alcohol use Yes       Allergies:  No Known Allergies      Review of Systems   Review of Systems   Musculoskeletal: Positive for arthralgias (bilateral knees, right hand) and myalgias (bilateral thighs). Neurological: Positive for headaches. Negative for syncope. All other systems reviewed and are negative. Physical Exam     Vitals:    05/26/18 1622   BP: 152/69   Pulse: 85   Resp: 16   Temp: 98 °F (36.7 °C)   SpO2: 99%   Weight: 131.1 kg (289 lb)   Height: 5' 3\" (1.6 m)     Physical Exam   Nursing note and vitals reviewed. Vital signs and nursing notes reviewed    CONSTITUTIONAL: Alert, in no apparent distress; well-developed; well-nourished. Morbidly obese. HEAD:  Normocephalic, atraumatic. Small scratch on her glasses consistent with a fall. EYES: PERRL; EOM's intact. ENTM: Nose: no rhinorrhea; Throat: no erythema or exudate, mucous membranes moist  Neck:  No JVD, supple without lymphadenopathy  RESP: Chest clear, equal breath sounds. CV: S1 and S2 WNL; No murmurs, gallops or rubs. GI: Normal bowel sounds, abdomen soft and non-tender. No masses or organomegaly. : No costo-vertebral angle tenderness. BACK:  Non-tender  UPPER EXT:  Right hand was tender over second and third metacarpals. LOWER EXT: No edema, no calf tenderness. Distal pulses intact. Bilateral knees had no laxity in the ACL/PCL/LCL/MCL. NEURO: CN intact, reflexes 2/4 and sym, strength 5/5 and sym, sensation intact. SKIN: No rashes; Normal for age and stage. Small ecchymosis in LLQ. PSYCH:  Alert and oriented, normal affect.     Diagnostic Study Results     Labs -   No results found for this or any previous visit (from the past 12 hour(s)). Radiologic Studies -   XR FEMUR LT 2 V    (Results Pending)   XR FEMUR RT 2 VS    (Results Pending)   XR HAND RT MIN 3 V    (Results Pending)   XR KNEE RT MAX 2 VWS    (Results Pending)   XR KNEE LT MAX 2 VWS    (Results Pending)     CT Results  (Last 48 hours)    None        CXR Results  (Last 48 hours)    None        6:10 PM  RADIOLOGY FINDINGS  Right femur X-ray shows no acute fracture or dislocation. Pending review by Radiologist  Recorded by Kavita Reynoso ED Scribe, as dictated by Marlene Real MD.    6:10 PM  RADIOLOGY FINDINGS  Right knee X-ray shows no acute fracture or dislocation. Severe degenerative changes. Pending review by Radiologist  Recorded by DARIO Hastingsibe, as dictated by Marlene Real MD.    6:10 PM  RADIOLOGY FINDINGS  Left femure X-ray shows no acute fracture or dislocation. Pending review by Radiologist  Recorded by DAIRO Hastingsibannemarie, as dictated by Marlene Real MD.    6:10 PM  RADIOLOGY FINDINGS  Left knee X-ray shows no acute fracture or dislocation. Severe degenerative changes medial worse than lateral.   Pending review by Radiologist  Recorded by DARIO Hastingsibe, as dictated by Marlene Real MD.    6:10 PM  RADIOLOGY FINDINGS  Right hand X-ray shows no acute fracture or dislocation. Oxygen saturation probe remains in place however metacarpals (wheree pt was actually tender) are well visualized without fracture. Pending review by Radiologist  Recorded by DARIO Hastings, as dictated by Marlene Real MD.    Medications given in the ED-  Medications   oxyCODONE-acetaminophen (PERCOCET) 5-325 mg per tablet 2 Tab (2 Tabs Oral Given 5/26/18 1708)         Medical Decision Making   I am the first provider for this patient. I reviewed the vital signs, available nursing notes, past medical history, past surgical history, family history and social history. Vital Signs-Reviewed the patient's vital signs.     Pulse Oximetry Analysis - 99% on RA     Records Reviewed: Nursing Notes and Old Medical Records    Provider Notes (Medical Decision Making):   DDX: sprain, strain, fracture, contusion, dislocation    Procedures:  Procedures    ED Course:   4:23 PM Initial assessment performed. The patients presenting problems have been discussed, and they are in agreement with the care plan formulated and outlined with them. I have encouraged them to ask questions as they arise throughout their visit. Diagnosis and Disposition       DISCHARGE NOTE:  6:26 PM  Antionette Pinto  results have been reviewed with her. She has been counseled regarding her diagnosis, treatment, and plan. She verbally conveys understanding and agreement of the signs, symptoms, diagnosis, treatment and prognosis and additionally agrees to follow up as discussed. She also agrees with the care-plan and conveys that all of her questions have been answered. I have also provided discharge instructions for her that include: educational information regarding their diagnosis and treatment, and list of reasons why they would want to return to the ED prior to their follow-up appointment, should her condition change. She has been provided with education for proper emergency department utilization. CLINICAL IMPRESSION:    1. Fall, initial encounter    2. Contusion of right knee, initial encounter    3. Contusion of left knee, initial encounter    4. Right leg pain    5. Left leg pain    6. Right wrist pain        PLAN:  1. D/C Home  2. Current Discharge Medication List      START taking these medications    Details   oxyCODONE-acetaminophen (PERCOCET) 5-325 mg per tablet Take 1 Tab by mouth every four (4) hours as needed for Pain. Max Daily Amount: 6 Tabs.   Qty: 12 Tab, Refills: 0    Associated Diagnoses: Right wrist pain; Left leg pain; Right leg pain; Contusion of left knee, initial encounter; Contusion of right knee, initial encounter; Fall, initial encounter CONTINUE these medications which have NOT CHANGED    Details   spironolactone (ALDACTONE) 100 mg tablet TAKE 1 TABLET EVERY DAY  Qty: 90 Tab, Refills: 3      atorvastatin (LIPITOR) 10 mg tablet Take  by mouth daily. tacrolimus (PROGRAF) 1 mg capsule Take 1 Cap by mouth every twelve (12) hours. Indications: autoimmune hepatitis  Qty: 60 Cap, Refills: 3      amLODIPine (NORVASC) 2.5 mg tablet       LANTUS SOLOSTAR 100 unit/mL (3 mL) inpn 26 Units daily. furosemide (LASIX) 40 mg tablet Take 1 Tab by mouth daily. Qty: 90 Tab, Refills: 3           3. Follow-up Information     Follow up With Details Comments [address] [place], [place] Schedule an appointment as soon as possible for a visit in 3 days For orthopedic follow up. [id number]      Bautista Kaufman MD Schedule an appointment as soon as possible for a visit in 3 days For primary care follow up. [address]      [facility] EMERGENCY DEPT Go to As needed, If symptoms worsen [address] [address]  [phone]        _______________________________    Attestations: This note is prepared by Adriana Rodriguez, acting as Scribe for Allen Fisher MD.    Allen Fisher MD:  The scribe's documentation has been prepared under my direction and personally reviewed by me in its entirety.   I confirm that the note above accurately reflects all work, treatment, procedures, and medical decision making performed by me.  _______________________________ needs device and assist

## 2020-11-09 NOTE — PROGRESS NOTE ADULT - ASSESSMENT
56 yo F w/ PMH of MS  was found unresponsive at home, intubated on arrival, code stroke, CT now signs of ICH. Lactate was 7 improved to 2. CT scan showed high grade SBO w/ transition point in RLQ. The patient is having BMS.      - Keep NPO  - Serial abdominal exams  - Trend labs  - Surgery team 2 will continue to follow

## 2020-11-09 NOTE — PHYSICAL THERAPY INITIAL EVALUATION ADULT - PLANNED THERAPY INTERVENTIONS, PT EVAL
manual therapy techniques/balance training/bed mobility training/stretching/ROM/strengthening/neuromuscular re-education/postural re-education/transfer training/gait training

## 2020-11-09 NOTE — PROGRESS NOTE ADULT - SUBJECTIVE AND OBJECTIVE BOX
TRANSFER [facility]    KIMBERLY REILLY, 55y, Female  MRN-[id number]  Patient is a 55y old  Female who presents with a chief complaint of TME (2020 07:15)  HOSPITAL COURSE: Ms. Reilly is a 56 yo F with PMH of MS (dx age 35, bedbound, AOx3), intracranial bleed many years ago, HTN, presents after being found poorly responsive but breathing in her bed at home.Pt found unresponsive at home and brought in by home health aid. in the past had episode of marijuana induced coma, with THC positive on Utox on this admission. ED was significant for an elefvated lactate to 7.0 which has since cleared and metabolic acidosis to 7.26 with concern for seizure and not being able to protect her airway with a resp rate of 6. She was intubated in the ED, given CTX/Flagyl, 3L NS, Keppra loaded 1g, and Narcan and sent to MICU.Her imaging was not significant for any stenosis or ICH. In the MICU she had vEEG placed and was taken off keppra and extubated. Patient with baseline confusion, Is A&Ox2 to self and hospital. Pt course c/b partial SBO/ileus with megaesophagus. NGT was unable to be placed 2/2 megaesophagus and tight lap band from previous bariatric surgery.    OVERNIGHT EVENTS: Had BM this AM. Moderate size, soft, brown.    SUBJECTIVE: Pt confused this AM. Knew she was in a hospital but thought she was in [place]. States she is feeling fine this AM.   -------------------------------------------------------------------------------  VITAL SIGNS:  Vital Signs Last 24 Hrs  T(C): 36.9 (2020 09:46), Max: 37.6 (2020 21:56)  T(F): 98.5 (2020 09:46), Max: 99.6 (2020 21:56)  HR: 94 (2020 11:00) (72 - 94)  BP: 129/72 (2020 11:00) (105/62 - 143/61)  BP(mean): 95 (2020 11:00) (78 - 99)  RR: 18 (2020 11:00) (10 - 20)  SpO2: 96% (2020 11:00) (92% - 100%)  Mode: standby    I&O's Summary    2020 07:  -  2020 07:00  --------------------------------------------------------  IN: 75.5 mL / OUT: 885 mL / NET: -809.5 mL    2020 07:01  -  2020 11:37  --------------------------------------------------------  IN: 255 mL / OUT: 140 mL / NET: 115 mL        PHYSICAL EXAM:    General: NAD, well developed female lying bed with EEG in place.  HEENT: NC/AT; EOMI, PERRL, anicteric sclera; moist mucosal membranes.  Neck: supple, trachea midline  Cardiovascular: RRR, +S1/S2; NO M/R/G  Respiratory: CTA B/L; no W/R/R  Gastrointestinal: soft, NT/ND; hypoactive bowel sounds  Extremities: WWP; no edema or cyanosis  Vascular: 2+ radial, DP/PT pulses B/L  Neurological: AAOx2. Neurological: AAOx2    ALLERGIES:  Allergies    No Known Allergies    Intolerances        MEDICATIONS:  MEDICATIONS  (STANDING):  apixaban 2.5 milliGRAM(s) Oral every 12 hours  cefTRIAXone   IVPB 2000 milliGRAM(s) IV Intermittent every 24 hours  chlorhexidine 2% Cloths 1 Application(s) Topical <User Schedule>  dextrose 50% Injectable 25 milliLiter(s) IV Push once  insulin lispro (ADMELOG) corrective regimen sliding scale   SubCutaneous every 6 hours    MEDICATIONS  (PRN):      -------------------------------------------------------------------------------  LABS:                        13.4   8.26  )-----------( 197      ( 2020 05:51 )             41.8         145  |  109<H>  |  10  ----------------------------<  78  3.6   |  25  |  0.45<L>    Ca    8.6      2020 05:51  Phos  1.2       Mg     2.1         TPro  5.8<L>  /  Alb  2.9<L>  /  TBili  0.2  /  DBili  x   /  AST  18  /  ALT  11  /  AlkPhos  64      LIVER FUNCTIONS - ( 2020 05:51 )  Alb: 2.9 g/dL / Pro: 5.8 g/dL / ALK PHOS: 64 U/L / ALT: 11 U/L / AST: 18 U/L / GGT: x           PT/INR - ( 2020 02:26 )   PT: 15.3 sec;   INR: 1.29          PTT - ( 2020 02:26 )  PTT:33.3 sec  ABG - ( 2020 04:36 )  pH, Arterial: 7.48  pH, Blood: x     /  pCO2: 38    /  pO2: 87    / HCO3: 27    / Base Excess: 3.7   /  SaO2: 97                Lactate, Blood: 0.9 mmol/L ( @ 05:51)  Lactate, Blood: 2.0 mmol/L ( @ 02:26)  Lactate, Blood: 5.3 mmol/L ( @ 22:55)  Lactate, Blood: 7.0 mmol/L ( @ 20:21)    CARDIAC MARKERS ( 2020 02:26 )  x     / x     / 110 U/L / x     / x      CARDIAC MARKERS ( 2020 23:01 )  x     / 0.02 ng/mL / x     / x     / x          Urinalysis Basic - ( 2020 21:39 )    Color: Yellow / Appearance: Clear / S.015 / pH: x  Gluc: x / Ketone: NEGATIVE  / Bili: Negative / Urobili: 0.2 E.U./dL   Blood: x / Protein: Trace mg/dL / Nitrite: NEGATIVE   Leuk Esterase: Moderate / RBC: < 5 /HPF / WBC Many /HPF   Sq Epi: x / Non Sq Epi: x / Bacteria: Many /HPF      CAPILLARY BLOOD GLUCOSE      POCT Blood Glucose.: 71 mg/dL (2020 11:31)  POCT Blood Glucose.: 76 mg/dL (2020 06:08)  POCT Blood Glucose.: 71 mg/dL (2020 22:53)  POCT Blood Glucose.: 88 mg/dL (2020 17:13)        Culture - Sputum (collected 2020 18:25)  Source: .Sputum Sputum  Gram Stain (2020 21:08):    No epithelial cells seen    Few WBC's    Few Yeast  Preliminary Report (2020 08:09):    Normal Respiratory Gloria present to date    Culture - Blood (collected 2020 23:52)  Source: .Blood Blood  Preliminary Report (2020 00:00):    No growth at 1 day.    Culture - Blood (collected 2020 23:52)  Source: .Blood Blood  Preliminary Report (2020 00:00):    No growth at 1 day.    Culture - Urine (collected 2020 21:58)  Source: .Urine Clean Catch (Midstream)  Final Report (2020 09:03):    90,000 CFU/ml Escherichia coli  Organism: Escherichia coli (2020 09:03)  Organism: Escherichia coli (2020 09:03)        RADIOLOGY & ADDITIONAL TESTS: Reviewed.

## 2020-11-09 NOTE — SWALLOW BEDSIDE ASSESSMENT ADULT - SWALLOW EVAL: DIAGNOSIS
Pt with functional oral and pharyngeal stages of swallowing with no overt signs & symptoms of airway protection deficits across consistencies tested.  Oral processing/mastication for solid was partially increased, attributed to generalized debility

## 2020-11-10 ENCOUNTER — TRANSCRIPTION ENCOUNTER (OUTPATIENT)
Age: 55
End: 2020-11-10

## 2020-11-10 DIAGNOSIS — E83.39 OTHER DISORDERS OF PHOSPHORUS METABOLISM: ICD-10-CM

## 2020-11-10 DIAGNOSIS — Z86.718 PERSONAL HISTORY OF OTHER VENOUS THROMBOSIS AND EMBOLISM: ICD-10-CM

## 2020-11-10 DIAGNOSIS — G35 MULTIPLE SCLEROSIS: ICD-10-CM

## 2020-11-10 DIAGNOSIS — G93.41 METABOLIC ENCEPHALOPATHY: ICD-10-CM

## 2020-11-10 DIAGNOSIS — F32.9 MAJOR DEPRESSIVE DISORDER, SINGLE EPISODE, UNSPECIFIED: ICD-10-CM

## 2020-11-10 DIAGNOSIS — N30.00 ACUTE CYSTITIS WITHOUT HEMATURIA: ICD-10-CM

## 2020-11-10 DIAGNOSIS — I10 ESSENTIAL (PRIMARY) HYPERTENSION: ICD-10-CM

## 2020-11-10 DIAGNOSIS — K56.600 PARTIAL INTESTINAL OBSTRUCTION, UNSPECIFIED AS TO CAUSE: ICD-10-CM

## 2020-11-10 DIAGNOSIS — N32.89 OTHER SPECIFIED DISORDERS OF BLADDER: ICD-10-CM

## 2020-11-10 LAB
ALBUMIN SERPL ELPH-MCNC: 3.2 G/DL — LOW (ref 3.3–5)
ALP SERPL-CCNC: 64 U/L — SIGNIFICANT CHANGE UP (ref 40–120)
ALT FLD-CCNC: 10 U/L — SIGNIFICANT CHANGE UP (ref 10–45)
ANION GAP SERPL CALC-SCNC: 15 MMOL/L — SIGNIFICANT CHANGE UP (ref 5–17)
AST SERPL-CCNC: 15 U/L — SIGNIFICANT CHANGE UP (ref 10–40)
BILIRUB SERPL-MCNC: 0.2 MG/DL — SIGNIFICANT CHANGE UP (ref 0.2–1.2)
BUN SERPL-MCNC: 10 MG/DL — SIGNIFICANT CHANGE UP (ref 7–23)
CALCIUM SERPL-MCNC: 9 MG/DL — SIGNIFICANT CHANGE UP (ref 8.4–10.5)
CHLORIDE SERPL-SCNC: 107 MMOL/L — SIGNIFICANT CHANGE UP (ref 96–108)
CO2 SERPL-SCNC: 21 MMOL/L — LOW (ref 22–31)
CREAT SERPL-MCNC: 0.38 MG/DL — LOW (ref 0.5–1.3)
CULTURE RESULTS: SIGNIFICANT CHANGE UP
GLUCOSE BLDC GLUCOMTR-MCNC: 81 MG/DL — SIGNIFICANT CHANGE UP (ref 70–99)
GLUCOSE BLDC GLUCOMTR-MCNC: 83 MG/DL — SIGNIFICANT CHANGE UP (ref 70–99)
GLUCOSE BLDC GLUCOMTR-MCNC: 84 MG/DL — SIGNIFICANT CHANGE UP (ref 70–99)
GLUCOSE BLDC GLUCOMTR-MCNC: 94 MG/DL — SIGNIFICANT CHANGE UP (ref 70–99)
GLUCOSE SERPL-MCNC: 88 MG/DL — SIGNIFICANT CHANGE UP (ref 70–99)
HCT VFR BLD CALC: 40.9 % — SIGNIFICANT CHANGE UP (ref 34.5–45)
HGB BLD-MCNC: 13.3 G/DL — SIGNIFICANT CHANGE UP (ref 11.5–15.5)
MAGNESIUM SERPL-MCNC: 1.8 MG/DL — SIGNIFICANT CHANGE UP (ref 1.6–2.6)
MCHC RBC-ENTMCNC: 31.8 PG — SIGNIFICANT CHANGE UP (ref 27–34)
MCHC RBC-ENTMCNC: 32.5 GM/DL — SIGNIFICANT CHANGE UP (ref 32–36)
MCV RBC AUTO: 97.8 FL — SIGNIFICANT CHANGE UP (ref 80–100)
NRBC # BLD: 0 /100 WBCS — SIGNIFICANT CHANGE UP (ref 0–0)
PHOSPHATE SERPL-MCNC: 1.5 MG/DL — LOW (ref 2.5–4.5)
PLATELET # BLD AUTO: 190 K/UL — SIGNIFICANT CHANGE UP (ref 150–400)
POTASSIUM SERPL-MCNC: 3.7 MMOL/L — SIGNIFICANT CHANGE UP (ref 3.5–5.3)
POTASSIUM SERPL-SCNC: 3.7 MMOL/L — SIGNIFICANT CHANGE UP (ref 3.5–5.3)
PROT SERPL-MCNC: 6.1 G/DL — SIGNIFICANT CHANGE UP (ref 6–8.3)
RBC # BLD: 4.18 M/UL — SIGNIFICANT CHANGE UP (ref 3.8–5.2)
RBC # FLD: 12.7 % — SIGNIFICANT CHANGE UP (ref 10.3–14.5)
SODIUM SERPL-SCNC: 143 MMOL/L — SIGNIFICANT CHANGE UP (ref 135–145)
SPECIMEN SOURCE: SIGNIFICANT CHANGE UP
WBC # BLD: 9.14 K/UL — SIGNIFICANT CHANGE UP (ref 3.8–10.5)
WBC # FLD AUTO: 9.14 K/UL — SIGNIFICANT CHANGE UP (ref 3.8–10.5)

## 2020-11-10 PROCEDURE — 99232 SBSQ HOSP IP/OBS MODERATE 35: CPT

## 2020-11-10 PROCEDURE — 99233 SBSQ HOSP IP/OBS HIGH 50: CPT | Mod: GC

## 2020-11-10 PROCEDURE — 95720 EEG PHY/QHP EA INCR W/VEEG: CPT

## 2020-11-10 RX ORDER — VERAPAMIL HCL 240 MG
120 CAPSULE, EXTENDED RELEASE PELLETS 24 HR ORAL DAILY
Refills: 0 | Status: DISCONTINUED | OUTPATIENT
Start: 2020-11-10 | End: 2020-11-11

## 2020-11-10 RX ORDER — POTASSIUM PHOSPHATE, MONOBASIC POTASSIUM PHOSPHATE, DIBASIC 236; 224 MG/ML; MG/ML
30 INJECTION, SOLUTION INTRAVENOUS ONCE
Refills: 0 | Status: COMPLETED | OUTPATIENT
Start: 2020-11-10 | End: 2020-11-10

## 2020-11-10 RX ORDER — LEVETIRACETAM 250 MG/1
1 TABLET, FILM COATED ORAL
Qty: 0 | Refills: 0 | DISCHARGE
Start: 2020-11-10

## 2020-11-10 RX ORDER — VERAPAMIL HCL 240 MG
120 CAPSULE, EXTENDED RELEASE PELLETS 24 HR ORAL DAILY
Refills: 0 | Status: DISCONTINUED | OUTPATIENT
Start: 2020-11-10 | End: 2020-11-10

## 2020-11-10 RX ADMIN — Medication 150 MILLIGRAM(S): at 17:00

## 2020-11-10 RX ADMIN — LEVETIRACETAM 500 MILLIGRAM(S): 250 TABLET, FILM COATED ORAL at 07:27

## 2020-11-10 RX ADMIN — TAMSULOSIN HYDROCHLORIDE 0.8 MILLIGRAM(S): 0.4 CAPSULE ORAL at 22:46

## 2020-11-10 RX ADMIN — LEVETIRACETAM 500 MILLIGRAM(S): 250 TABLET, FILM COATED ORAL at 17:00

## 2020-11-10 RX ADMIN — Medication 5 MILLIGRAM(S): at 10:32

## 2020-11-10 RX ADMIN — APIXABAN 2.5 MILLIGRAM(S): 2.5 TABLET, FILM COATED ORAL at 07:27

## 2020-11-10 RX ADMIN — CHLORHEXIDINE GLUCONATE 1 APPLICATION(S): 213 SOLUTION TOPICAL at 07:27

## 2020-11-10 RX ADMIN — FAMOTIDINE 20 MILLIGRAM(S): 10 INJECTION INTRAVENOUS at 11:53

## 2020-11-10 RX ADMIN — Medication 1 TABLET(S): at 11:54

## 2020-11-10 RX ADMIN — Medication 1 TABLET(S): at 11:53

## 2020-11-10 RX ADMIN — ESCITALOPRAM OXALATE 40 MILLIGRAM(S): 10 TABLET, FILM COATED ORAL at 11:53

## 2020-11-10 RX ADMIN — Medication 120 MILLIGRAM(S): at 17:01

## 2020-11-10 RX ADMIN — BUPROPION HYDROCHLORIDE 200 MILLIGRAM(S): 150 TABLET, EXTENDED RELEASE ORAL at 11:54

## 2020-11-10 RX ADMIN — APIXABAN 2.5 MILLIGRAM(S): 2.5 TABLET, FILM COATED ORAL at 17:00

## 2020-11-10 RX ADMIN — VALACYCLOVIR 500 MILLIGRAM(S): 500 TABLET, FILM COATED ORAL at 17:00

## 2020-11-10 RX ADMIN — CEFTRIAXONE 100 MILLIGRAM(S): 500 INJECTION, POWDER, FOR SOLUTION INTRAMUSCULAR; INTRAVENOUS at 22:46

## 2020-11-10 RX ADMIN — Medication 5 MILLIGRAM(S): at 17:00

## 2020-11-10 RX ADMIN — VALACYCLOVIR 500 MILLIGRAM(S): 500 TABLET, FILM COATED ORAL at 07:27

## 2020-11-10 RX ADMIN — Medication 150 MILLIGRAM(S): at 07:27

## 2020-11-10 RX ADMIN — POTASSIUM PHOSPHATE, MONOBASIC POTASSIUM PHOSPHATE, DIBASIC 83.33 MILLIMOLE(S): 236; 224 INJECTION, SOLUTION INTRAVENOUS at 18:22

## 2020-11-10 NOTE — DISCHARGE NOTE PROVIDER - NSDCCPCAREPLAN_GEN_ALL_CORE_FT
PRINCIPAL DISCHARGE DIAGNOSIS  Diagnosis: Toxic metabolic encephalopathy  Assessment and Plan of Treatment: you were diagnosed with toxic metabolic encephalopathy is a probelm in the brain caused by a chemical imbalance in the blood. your toxic metabolic encephalopathy is likely due to multiple factors. Cannibus use could potentially be interacting with your daily medications. you were also found to have a urinary tract infection. in addition to seizure activity found on video EEG which is ultimately a device used to detect abnormal brain activity. it is possibly all of these confounding factors may have thrown off the bodys natural chemical balance leading to brain dysfunction ultimately resulting in a coma.  1. THC/cannibus : refrain from cannibus use  2. You were found with a species of bacteria called E. Coli in your urine. you are being treated with +++++++ antibiotics. it is important that you continue taking your antibiotics for ++++++ days.  3. You were found to have seizure activity on you vEEG (a device used to detect brain electrical activity) it is unclear why you have seisures but it may be related to your mutiple sclerosis. in order to prevent the reocurrance of seizures you were started on keppra 500mg to be taked twice a day/every 12 hours. it is important that you follow up with your neurologist or primary care doctor to follow up on the use of this medication.       PRINCIPAL DISCHARGE DIAGNOSIS  Diagnosis: Toxic metabolic encephalopathy  Assessment and Plan of Treatment: you were diagnosed with toxic metabolic encephalopathy is a probelm in the brain caused by a chemical imbalance in the blood. your toxic metabolic encephalopathy is likely due to multiple factors. Cannibus use could potentially be interacting with your daily medications. you were also found to have a urinary tract infection. in addition to seizure activity found on video EEG which is ultimately a device used to detect abnormal brain activity. it is possibly all of these confounding factors may have thrown off the bodys natural chemical balance leading to brain dysfunction ultimately resulting in a coma.  1. THC/cannibus : refrain from cannibus use  2. You were found with a species of bacteria called E. Coli in your urine. you are being treated with Antibiotics, please continue taking Keflex, 1 tablet every 12 hours antibiotics. It is important that you continue taking your antibiotics for through 11/14/20.  3. You were found to have seizure activity on you vEEG (a device used to detect brain electrical activity) it is unclear why you have seisures but it may be related to your mutiple sclerosis. in order to prevent the reocurrance of seizures you were started on keppra 500mg to be taked twice a day/every 12 hours. it is important that you follow up with your neurologist or primary care doctor to follow up on the use of this medication.       PRINCIPAL DISCHARGE DIAGNOSIS  Diagnosis: Toxic metabolic encephalopathy  Assessment and Plan of Treatment: you were diagnosed with toxic metabolic encephalopathy is a probelm in the brain caused by a chemical imbalance in the blood. your toxic metabolic encephalopathy is likely due to multiple factors. Cannibus use could potentially be interacting with your daily medications. you were also found to have a urinary tract infection. in addition to seizure activity found on video EEG which is ultimately a device used to detect abnormal brain activity. it is possibly all of these confounding factors may have thrown off the bodys natural chemical balance leading to brain dysfunction ultimately resulting in a coma.  1. THC/cannibus : refrain from cannibus use  2. You were found with a species of bacteria called E. Coli in your urine. you are being treated with Antibiotics, please continue taking Keflex, 1 tablet every 12 hours antibiotics. It is important that you continue taking your antibiotics for through 11/14/20.  3. You were found to have seizure activity on you vEEG (a device used to detect brain electrical activity) it is unclear why you have seisures but it may be related to your mutiple sclerosis. in order to prevent the reocurrance of seizures you were started on keppra 500mg to be taked twice a day/every 12 hours for at least 6-8 weeks until you have another EEG done. it is important that you follow up with your neurologist or primary care doctor to follow up on the use of this medication.

## 2020-11-10 NOTE — PROGRESS NOTE ADULT - SUBJECTIVE AND OBJECTIVE BOX
Patient is a 55y old  Female who presents with a chief complaint of TME (10 Nov 2020 12:29)      INTERVAL HPI/OVERNIGHT EVENTS:    Pt. seen and examined this morning  Pt. watching TV, has no complaints  Tolerating PO; no N/V    Review of Systems: 12 point review of systems otherwise negative    MEDICATIONS  (STANDING):  apixaban 2.5 milliGRAM(s) Oral every 12 hours  buPROPion  milliGRAM(s) Oral daily  cefTRIAXone   IVPB 2000 milliGRAM(s) IV Intermittent every 24 hours  chlorhexidine 2% Cloths 1 Application(s) Topical <User Schedule>  escitalopram 40 milliGRAM(s) Oral daily  famotidine    Tablet 20 milliGRAM(s) Oral daily  insulin lispro (ADMELOG) corrective regimen sliding scale   SubCutaneous every 6 hours  lactobacillus acidophilus 1 Tablet(s) Oral daily  levETIRAcetam 500 milliGRAM(s) Oral two times a day  oxybutynin 5 milliGRAM(s) Oral two times a day  pregabalin 150 milliGRAM(s) Oral two times a day  tamsulosin 0.8 milliGRAM(s) Oral at bedtime  valACYclovir 500 milliGRAM(s) Oral every 12 hours  verapamil  milliGRAM(s) Oral daily  vitamin B complex with vitamin C 1 Tablet(s) Oral daily    MEDICATIONS  (PRN):      Allergies    No Known Allergies    Intolerances          Vital Signs Last 24 Hrs  T(C): 36.9 (10 Nov 2020 11:25), Max: 36.9 (10 Nov 2020 11:25)  T(F): 98.5 (10 Nov 2020 11:25), Max: 98.5 (10 Nov 2020 11:25)  HR: 93 (10 Nov 2020 11:25) (93 - 105)  BP: 136/84 (10 Nov 2020 11:25) (120/88 - 136/84)  BP(mean): --  RR: 16 (10 Nov 2020 11:25) (16 - 16)  SpO2: 96% (10 Nov 2020 11:25) (94% - 96%)  CAPILLARY BLOOD GLUCOSE      POCT Blood Glucose.: 84 mg/dL (10 Nov 2020 11:51)  POCT Blood Glucose.: 81 mg/dL (10 Nov 2020 06:12)  POCT Blood Glucose.: 83 mg/dL (10 Nov 2020 00:12)  POCT Blood Glucose.: 75 mg/dL (09 Nov 2020 17:16)      11-09 @ 07:01  -  11-10 @ 07:00  --------------------------------------------------------  IN: 255 mL / OUT: 440 mL / NET: -185 mL    11-10 @ 07:01  -  11-10 @ 13:13  --------------------------------------------------------  IN: 0 mL / OUT: 550 mL / NET: -550 mL        Physical Exam:  (this morning)  Daily     Daily   General: comfortable-appearing in NAD  HEENT: +EEG  CV:  RRR, no murmur, no JVD  Lungs:  CTA B/L, no wheezes, rales, rhonchi  Abdomen:  Soft, non-tender, no distended, positive BS, no hepatosplenomegaly  Extremities: no edema B/L LE  Skin:  WWP  Neuro:  AAOx2, slow speech    LABS:                        13.3   9.14  )-----------( 190      ( 10 Nov 2020 11:21 )             40.9     11-10    143  |  107  |  10  ----------------------------<  88  3.7   |  21<L>  |  0.38<L>    Ca    9.0      10 Nov 2020 11:21  Phos  1.5     11-10  Mg     1.8     11-10    TPro  6.1  /  Alb  3.2<L>  /  TBili  0.2  /  DBili  x   /  AST  15  /  ALT  10  /  AlkPhos  64  11-10            RADIOLOGY & ADDITIONAL TESTS:    ---------------------------------------------------------------------------  I personally reviewed: [  ]EKG   [  ]CXR    [  ] CT    [  ]Other  ---------------------------------------------------------------------------  PLEASE CHECK WHEN PRESENT:     [  ]Heart Failure     [  ] Acute     [  ] Acute on Chronic     [  ] Chronic  -------------------------------------------------------------------     [  ]Diastolic [HFpEF]     [  ]Systolic [HFrEF]     [  ]Combined [HFpEF & HFrEF]     [  ]Other:  -------------------------------------------------------------------  [  ]MELL     [  ]ATN     [  ]Reneal Medullary Necrosis     [  ]Renal Cortical Necrosis     [  ]Other Pathological Lesions:    [  ]CKD 1  [  ]CKD 2  [  ]CKD 3  [  ]CKD 4  [  ]CKD 5  [  ]Other  -------------------------------------------------------------------  [  ]Other/Unspecified:    --------------------------------------------------------------------    Abdominal Nutritional Status  [  ]Malnutrition: See Nutrition Note  [  ]Cachexia  [  ]Other:   [  ]Supplement Ordered:  [  ]Morbid Obesity (BMI >=40]

## 2020-11-10 NOTE — DISCHARGE NOTE PROVIDER - PROVIDER TOKENS
PROVIDER:[TOKEN:[[id number]],FOLLOWUP:[1 week]] PROVIDER:[TOKEN:[[id number]],SCHEDULEDAPPT:[11/23/2020],SCHEDULEDAPPTTIME:[02:20 PM]]

## 2020-11-10 NOTE — DISCHARGE NOTE PROVIDER - NSDCHHPTASSISTHOME_GEN_ALL_CORE
Name: Jaswant Dominique    : 1963    Discharge Date: 2020    Primary Auth/Cert #: [id number]    Discharge Medications:      Medication List      START taking these medications    magnesium oxide 400 (241.3 Mg) MG Tabs tablet  Commonly known as:  MAG-OX  Take 1 tablet by mouth 2 times daily  Notes to patient:  Vitamin      OLANZapine 5 MG tablet  Commonly known as:  ZYPREXA  Take 1 tablet by mouth nightly  Notes to patient:  Antipsychotic         CHANGE how you take these medications    DULoxetine 60 MG extended release capsule  Commonly known as:  CYMBALTA  take 1 capsule by mouth twice a day  What changed:  See the new instructions.   Notes to patient:  Antidepressant      hydrOXYzine 50 MG tablet  Commonly known as:  ATARAX  Take 1 tablet by mouth 3 times daily as needed for Anxiety  What changed:    · medication strength  · how much to take  · when to take this  · reasons to take this  Notes to patient:  Anxiety         CONTINUE taking these medications    acetaminophen 325 MG tablet  Commonly known as:  Tylenol  Take 2 tablets by mouth every 6 hours as needed for Pain  Notes to patient:  Pain relief     albuterol sulfate  (90 Base) MCG/ACT inhaler  inhale 2 puffs by mouth every 6 hours if needed for wheezing  Notes to patient:  Wheezing      atorvastatin 20 MG tablet  Commonly known as:  LIPITOR  take 1 tablet by mouth at bedtime  Notes to patient:  Cholesterol      Breo Ellipta 200-25 MCG/INH Aepb inhaler  Generic drug:  Fluticasone furoate-vilanterol  Notes to patient:  Wheezing or shortness of breath     cloNIDine 0.2 MG tablet  Commonly known as:  CATAPRES  take 1 tablet by mouth twice a day  Notes to patient:  Hypertension      hydrALAZINE 50 MG tablet  Commonly known as:  APRESOLINE  Take 0.5 tablets by mouth 3 times daily  Notes to patient:  Hypertension      ipratropium-albuterol 0.5-2.5 (3) MG/3ML Soln nebulizer solution  Commonly known as:  DUONEB  Inhale 3 mLs into the lungs every 4 hours as needed for Shortness of Breath  Notes to patient:  Shortness of breath     isosorbide mononitrate 30 MG extended release tablet  Commonly known as:  IMDUR  take 1 tablet by mouth once daily  Notes to patient:  Hypertension      metoclopramide 10 MG tablet  Commonly known as:  REGLAN  Take 1 tablet by mouth 3 times daily (before meals)  Notes to patient:  Increase appetite     metoprolol 100 MG tablet  Commonly known as:  LOPRESSOR  Take 1 tablet by mouth 2 times daily  Notes to patient:  Hypertension      multivitamin tablet  Take 1 tablet by mouth daily  Notes to patient:  Vitamin      nitroGLYCERIN 0.4 MG SL tablet  Commonly known as:  Nitrostat  Place 1 tablet under the tongue every 5 minutes as needed for Chest pain up to max of 3 total doses. If no relief after 1 dose, call 911.   Notes to patient:  Angina      pantoprazole 40 MG tablet  Commonly known as:  PROTONIX  Take 1 tablet by mouth daily  Notes to patient:  Acid reflux     tamsulosin 0.4 MG capsule  Commonly known as:  FLOMAX  Take 1 capsule by mouth daily  Notes to patient:  Urinary retention     traZODone 100 MG tablet  Commonly known as:  DESYREL  Notes to patient:  Insomnia         STOP taking these medications    ARIPiprazole 5 MG tablet  Commonly known as:  ABILIFY     furosemide 40 MG tablet  Commonly known as:  LASIX     potassium chloride 10 MEQ extended release tablet  Commonly known as:  KLOR-CON M     tiotropium 2.5 MCG/ACT Aers inhaler  Commonly known as:  SPIRIVA RESPIMAT           Where to Get Your Medications      These medications were sent to [address] [place] [id number]    Phone:  [phone]   · hydrOXYzine 50 MG tablet  · magnesium oxide 400 (241.3 Mg) MG Tabs tablet  · OLANZapine 5 MG tablet      Pharmacy Instructions:      Medications were either electronically prescribed or called into [facility] on [address].                  Follow Up Appointment: MD Lydia Posada [facility] [id number]  [address] [facility], [place] [address] [place] [address] [address]  [phone]          Discharge to home:  [address]   Go on 7/13/2020  Please send home via [facility] transportation    [facility]  [facility] [address], Counts include 234 beds at the [facility] [address]  Phone: [phone]   Fax: [phone]    Go on 7/16/2020  Blanca Oliveros, you have the following 2 appointments: (1) Thurs., July 16 phone call at 9:30a with Froylan Roberts CNP, (2) Mon., July 20 phone call at 1:30pm with therapist Carly Doyle Patient Needs Assistance to Leave Residence...

## 2020-11-10 NOTE — DISCHARGE NOTE PROVIDER - NSDCMRMEDTOKEN_GEN_ALL_CORE_FT
baclofen 20 mg oral tablet: 1 tab(s) orally 3 times a day, As Needed  buPROPion 200 mg/12 hours (SR) oral tablet, extended release: 1 tab(s) orally once a day  clonazePAM 0.5 mg oral tablet: 3 tab(s) orally once a day (at bedtime)  Eliquis 2.5 mg oral tablet: 1 tab(s) orally 2 times a day  escitalopram 20 mg oral tablet: 2 tab(s) orally once a day  famotidine 20 mg oral tablet: 1 tab(s) orally once a day  Florastor 250 mg oral capsule: 1 cap(s) orally 2 times a day  Hiprex 1 g oral tablet: 1 tab(s) orally 2 times a day  levETIRAcetam 500 mg oral tablet: 1 tab(s) orally 2 times a day  magnesium oxide 400 mg (240 mg elemental magnesium) oral tablet: 4 tab(s) orally once a day  oxybutynin 10 mg/24 hr oral tablet, extended release: 1 tab(s) orally once a day  pregabalin 150 mg oral capsule: 1 cap(s) orally 2 times a day  Probiotic Formula: 1 tab(s) orally once a day (Probiomed)  tamsulosin 0.4 mg oral capsule: 2 cap(s) orally once a day  traZODone 50 mg oral tablet: 1 tab(s) orally once a day  valACYclovir 500 mg oral tablet: 1 tab(s) orally every 12 hours  verapamil 120 mg/24 hours oral capsule, extended release: 1 cap(s) orally once a day  Vitamin B Complex 100: 1 cap(s) orally once a day  Vitamin C 500 mg oral tablet: 1 tab(s) orally 2 times a day   baclofen 20 mg oral tablet: 1 tab(s) orally 3 times a day, As Needed  buPROPion 200 mg/12 hours (SR) oral tablet, extended release: 1 tab(s) orally once a day  clonazePAM 0.5 mg oral tablet: 3 tab(s) orally once a day (at bedtime)  Eliquis 2.5 mg oral tablet: 1 tab(s) orally 2 times a day  escitalopram 20 mg oral tablet: 2 tab(s) orally once a day  famotidine 20 mg oral tablet: 1 tab(s) orally once a day  Florastor 250 mg oral capsule: 1 cap(s) orally 2 times a day  Hiprex 1 g oral tablet: 1 tab(s) orally 2 times a day  Keflex 500 mg oral capsule: 1 cap(s) orally 2 times a day   levETIRAcetam 500 mg oral tablet: 1 tab(s) orally 2 times a day  magnesium oxide 400 mg (240 mg elemental magnesium) oral tablet: 4 tab(s) orally once a day  oxybutynin 10 mg/24 hr oral tablet, extended release: 1 tab(s) orally once a day  pregabalin 150 mg oral capsule: 1 cap(s) orally 2 times a day  Probiotic Formula: 1 tab(s) orally once a day (Probiomed)  tamsulosin 0.4 mg oral capsule: 2 cap(s) orally once a day  traZODone 50 mg oral tablet: 1 tab(s) orally once a day  valACYclovir 500 mg oral tablet: 1 tab(s) orally every 12 hours  verapamil 120 mg/24 hours oral capsule, extended release: 1 cap(s) orally once a day  Vitamin B Complex 100: 1 cap(s) orally once a day  Vitamin C 500 mg oral tablet: 1 tab(s) orally 2 times a day

## 2020-11-10 NOTE — PROGRESS NOTE ADULT - PROBLEM SELECTOR PLAN 4
clinically resolved; appreciate GI and Surgery recs; cont. diet as tolerated, serial abdominal exams

## 2020-11-10 NOTE — PROGRESS NOTE ADULT - PROBLEM SELECTOR PLAN 3
likely multifactorial, d/t UTI and possible seizure, in setting of ICH hx, THC use (Utox+); mental status improving to baseline; cont. mgmt as above, frequent re-orientation

## 2020-11-10 NOTE — DISCHARGE NOTE PROVIDER - NSDCFUADDAPPT_GEN_ALL_CORE_FT
please follow up with your neurologist and your primary care doctor Please follow up with your neurologist within 1-2 weeks on discharge and your primary care doctor within 1-2 weeks on discharge. Please follow up with your neurologist, Dr. Espinal on 11/23 at 2:20 PM, an appointment has been made- please bring your insurance card with you. If you need to reschedule please call the phone number provided.     Please follow up with your Primary care doctor within 1-2 weeks of discharge. Please follow up with neurologist, Dr. Espinal on 11/23 at 2:20 PM, an appointment has been made- please bring your insurance card with you. If you need to reschedule please call the phone number provided. You will need an EEG in 6-8 weeks and should continue taking Keppra until then.     Please also call to make an appointment with your Neurologist within 2 weeks of discharge.    Please follow up with your Primary care doctor within 1-2 weeks of discharge.

## 2020-11-10 NOTE — DISCHARGE NOTE PROVIDER - HOSPITAL COURSE
#Discharge: do not delete  Ms. Reilly is a 56 yo F with PMH of MS, previous ICH, and toxin induced coma previously who presents with coma, CT without new acute findings but notable for significant gliosis and encephalomalacia; given stated history of seizure/coma in setting of cannabis use and otherwise isolated lactic acidosis clinical suspicion for seizure is high. UTI noted may have precipitated seizure.    Problem List/Main Diagnoses: Toxic metabolic encephalopathy  Inpatient treatment course: Patient presents after being found poorly responsive but breathing in her bed at home. Pt found unresponsive at home and brought in by home health aid. in the past had episode of marijuana induced coma, with THC positive on Utox on this admission. ED was significant for an elevated lactate to 7.0 which has since cleared and metabolic acidosis to 7.26 with concern for seizure and not being able to protect her airway with a resp rate of 6. She was intubated in the ED (extubated next day), given CTX/Flagyl, 3L NS, Keppra loaded 1g, and Narcan and sent to MICU. Her imaging was not significant for any stenosis or ICH. Pt course c/b partial SBO/ileus with megaesophagus. NGT was unable to be placed 2/2 megaesophagus and tight lap band from previous bariatric surgery. Gastric bypass was found to be deflated. Patient was evaluated by speech and swallow, resumed regular diet where NG tube was no longer needed. Patient was put on vEEG to r/o seizure. vEEG did show seizure like activity and patient was started on keppra 500mg BID with improvement of seizure activity. Patient returned to baseline mental status and was discharged on KEFLEX? ...mg daily until... and keppra 500mg BID to be follow up with ....      New medications: KEFLEX?....., Keppra 500mg BID  Labs to be followed outpatient: none  Exam to be followed outpatient: Neurology   #Discharge: do not delete  Ms. Reilly is a 54 yo F with PMH of MS, previous ICH, and toxin induced coma previously who presents with coma, CT without new acute findings but notable for significant gliosis and encephalomalacia; given stated history of seizure/coma in setting of cannabis use and otherwise isolated lactic acidosis clinical suspicion for seizure is high. UTI noted may have precipitated seizure.    Problem List/Main Diagnoses: Toxic metabolic encephalopathy  Inpatient treatment course: Patient presents after being found poorly responsive but breathing in her bed at home. Pt found unresponsive at home and brought in by home health aid. in the past had episode of marijuana induced coma, with THC positive on Utox on this admission. ED was significant for an elevated lactate to 7.0 which has since cleared and metabolic acidosis to 7.26 with concern for seizure and not being able to protect her airway with a resp rate of 6. She was intubated in the ED (extubated next day), given CTX/Flagyl, 3L NS, Keppra loaded 1g, and Narcan and sent to MICU. Her imaging was not significant for any stenosis or ICH. Pt course c/b partial SBO/ileus with megaesophagus. NGT was unable to be placed 2/2 megaesophagus and tight lap band from previous bariatric surgery. Gastric bypass was found to be deflated. Patient was evaluated by speech and swallow, resumed regular diet where NG tube was no longer needed. Patient was put on vEEG to r/o seizure. vEEG did show seizure like activity and patient was started on keppra 500mg BID with improvement of seizure activity. Patient returned to baseline mental status and was discharged on Keflex 500mg BID for 7 days total (11/7- 11/14) and Keppra 500mg BID to be follow up with Neurology.      New medications: Keflex 500mg BID through 11/14, Keppra 500mg BID  Labs to be followed outpatient: none  Exam to be followed outpatient: Neurology

## 2020-11-10 NOTE — PROGRESS NOTE ADULT - ASSESSMENT
Ms. Reilly is a 54 yo F with PMH of MS, previous ICH, and toxin induced coma previously who presents with coma, CT without new acute findings but notable for significant gliosis and encephalomalacia; given stated history of seizure/coma in setting of cannabis use and otherwise isolated lactic acidosis clinical suspicion for seizure is high. UTI noted may have precipitated seizure.      #r/o Coma vs seizure. Previously was on propofol and intubated. No longer intubated and not on propofol. A&Ox2 this morning. Per step brother she has waxing and waning cognition at baseline.   - vEEG showing seizure activity  - epilepsy and gen neurology following, recs appreciated   c/w keppra 500mg BID      #HTN.   - Pt slightly hypertensive and tachycardic today   - restarted home verapamil        # Partial Ileus or SBO. As patient has very dilated loops of bowel and small bowel with fluid within it. However pt did have vomitus and BMs in ED. Pt also had BM this morning. NGT was never placed but pt seems to be tolerating well without it.   - Speech and swallow consult for restarting diet, rec mechanical soft with thin liquids  - GI, bariatric surgery, and surgery consulted-NTD, no need for colonoscopy      # Spastic bladder  - No acute issues. Is urinating fine.   - dover removed, Pt w/ bladder scan 340cc, urinary retention agents restarted day prior. s/p straight cath/  - q8 bladder scans  - c/w urinary retention medications     # MS  - holding home antidepressants while patient is NPO. Can restart once evaluated by speech and swallow.  - c/w welbutrin      # UTI 2/2 EColi  In spite of lactic acidosis only meets 1/4 SIRS so not consistent with sepsis. Note that leukocytosis poss  after patient received Solu-medrol in ED, not secondary to infection.  - CTX 2 g q24h (11/7 - ) for EColi treatment      # Chronic anticoagulation  - was previously on eliquis 2.5 bid for DVT in the past, but per step brother she had dopplers last month that showed no DVT present.  - c/w eliquis 2.5 BID      -IVF: s/p 3L NS in ED. Currently not on fluids.  -Monitor, Replete to K>4 and Mg>2  -Diet: NPO, speech and swallow consult.  PROPHYLAXIS  -DVT: Eliquis 2.5  -GI: none

## 2020-11-10 NOTE — DISCHARGE NOTE PROVIDER - CARE PROVIDER_API CALL
Raegan Espinal)  Neurology  [address], [address] [place] [address]  Phone: [phone]  Fax: [phone]  Follow Up Time: 1 week   Raegan Espinal)  Neurology  [address], [address] [place] [address]  Phone: [phone]  Fax: [phone]  Scheduled Appointment: 11/23/2020 02:20 PM

## 2020-11-10 NOTE — PROGRESS NOTE ADULT - ASSESSMENT
55-year-old woman with MS and remote intracranial hemorrhage p/w unresponsiveness requiring intubation, now extubated and on antibiotics for urosepsis.  She is still quite encephalopathic, possibly 2/2 resolving UTI, though EEG also shows frequent epileptiform discharges, although amplitude <2Hz,  indicating that she may have had a seizure, and is at risk for seizures. History of chronic MS may lower seizure threshold especially in setting of UTI.      Recommendations:  - continue EEG, final read pending  - c/w Keppra 500 mg BID  - Will likely continue seizure prophylaxis at discharge pending a repeat EEG outpatient  - treat underlying UTI as per primary team    Plan discussed with Dr. Espinal. Neurology will continue to follow.   55-year-old woman with MS and remote intracranial hemorrhage p/w unresponsiveness requiring intubation, now extubated and on antibiotics for urosepsis.      She is still quite encephalopathic, possibly 2/2 resolving UTI, though EEG also shows frequent epileptiform discharges, indicating that she may have had a seizure, and is at risk for seizures. History of chronic MS may lower seizure threshold especially in setting of UTI.      Recommendations:  - EEG improved today, likely dc tomorrow if mental status continues to improve  - c/w Keppra 500 mg BID  - Will likely continue seizure prophylaxis at discharge pending a repeat EEG outpatient  - treat underlying UTI as per primary team    Plan discussed with Dr. Espinal. Neurology will continue to follow.

## 2020-11-10 NOTE — PROGRESS NOTE ADULT - SUBJECTIVE AND OBJECTIVE BOX
INTERVAL HPI/OVERNIGHT EVENTS: no acute events    SUBJECTIVE: Patient seen and examined at bedside. No acute complaints, feels about the same as yesterday. Patient still thought she was in the ICU. She felt that her mentation was about the same. During conversation difficulty focusing on exam, but review of systems negative.    OBJECTIVE:    VITAL SIGNS:  ICU Vital Signs Last 24 Hrs  T(C): 36.9 (10 Nov 2020 11:25), Max: 36.9 (10 Nov 2020 11:25)  T(F): 98.5 (10 Nov 2020 11:25), Max: 98.5 (10 Nov 2020 11:25)  HR: 93 (10 Nov 2020 11:25) (93 - 105)  BP: 136/84 (10 Nov 2020 11:25) (120/88 - 136/84)  BP(mean): --  ABP: --  ABP(mean): --  RR: 16 (10 Nov 2020 11:25) (16 - 16)  SpO2: 96% (10 Nov 2020 11:25) (94% - 96%)        11-09 @ 07:01  -  11-10 @ 07:00  --------------------------------------------------------  IN: 255 mL / OUT: 440 mL / NET: -185 mL    11-10 @ 07:01  -  11-10 @ 12:30  --------------------------------------------------------  IN: 0 mL / OUT: 550 mL / NET: -550 mL      CAPILLARY BLOOD GLUCOSE      POCT Blood Glucose.: 84 mg/dL (10 Nov 2020 11:51)      PHYSICAL EXAM:    General: NAD, answering questions slowly  HEENT: NC/AT; PERRL, clear conjunctiva, vEEG leads on  Neck: supple  Respiratory: CTA b/l  Cardiovascular: +S1/S2; RRR  Abdomen: soft, NT/ND; +BS x4  Extremities: WWP, 2+ peripheral pulses b/l, slight peripheral edam  Skin: normal color and turgor; no rash  Psych: flat affect  Neurologic:     -Mental Status: AAOx2 (name and location, thinks year is 1920). Speech is fluent with intact naming, but slow speech and some word finding difficulty. Follows commands. Attention/concentration intact. Fund of knowledge appropriate     -Cranial Nerves:          II: Visual fields are full to confrontation.          III, IV, VI: EOMI without nystagmus.          V:  Facial sensation V1-V3 equal and intact           VII: Face is symmetric with normal eye closure and smile          VIII: Hearing is bilaterally intact to finger rub          IX, X: Uvula is midline and soft palate rises symmetrically          XI: Head turning and shoulder shrug are intact.          XII: Tongue protrudes midline     -Motor: Normal bulk and tone. Strength bilateral upper extremity 3/5, bilateral lower extremities 2/5.      -Sensation: Intact to light touch bilaterally.       MEDICATIONS:  MEDICATIONS  (STANDING):  apixaban 2.5 milliGRAM(s) Oral every 12 hours  buPROPion  milliGRAM(s) Oral daily  cefTRIAXone   IVPB 2000 milliGRAM(s) IV Intermittent every 24 hours  chlorhexidine 2% Cloths 1 Application(s) Topical <User Schedule>  escitalopram 40 milliGRAM(s) Oral daily  famotidine    Tablet 20 milliGRAM(s) Oral daily  insulin lispro (ADMELOG) corrective regimen sliding scale   SubCutaneous every 6 hours  lactobacillus acidophilus 1 Tablet(s) Oral daily  levETIRAcetam 500 milliGRAM(s) Oral two times a day  oxybutynin 5 milliGRAM(s) Oral two times a day  pregabalin 150 milliGRAM(s) Oral two times a day  tamsulosin 0.8 milliGRAM(s) Oral at bedtime  valACYclovir 500 milliGRAM(s) Oral every 12 hours  verapamil  milliGRAM(s) Oral daily  vitamin B complex with vitamin C 1 Tablet(s) Oral daily    MEDICATIONS  (PRN):      ALLERGIES:  Allergies    No Known Allergies    Intolerances        LABS:                        13.3   9.14  )-----------( 190      ( 10 Nov 2020 11:21 )             40.9     11-10    143  |  107  |  10  ----------------------------<  88  3.7   |  21<L>  |  0.38<L>    Ca    9.0      10 Nov 2020 11:21  Phos  1.5     11-10  Mg     1.8     11-10    TPro  6.1  /  Alb  3.2<L>  /  TBili  0.2  /  DBili  x   /  AST  15  /  ALT  10  /  AlkPhos  64  11-10              Culture - Blood (collected 11-07-20 @ 23:52)  Source: .Blood Blood  Preliminary Report (11-10-20 @ 00:01):    No growth at 2 days.    Culture - Blood (collected 11-07-20 @ 23:52)  Source: .Blood Blood  Preliminary Report (11-10-20 @ 00:01):    No growth at 2 days.            RADIOLOGY & ADDITIONAL TESTS: Reviewed.   INTERVAL HPI/OVERNIGHT EVENTS: no acute events    SUBJECTIVE: Patient seen and examined at bedside. No acute complaints, feels about the same as yesterday. Patient still thought she was in the ICU. She felt that her mentation was about the same. During conversation difficulty focusing on exam, but review of systems negative.    OBJECTIVE:    VITAL SIGNS:  ICU Vital Signs Last 24 Hrs  T(C): 36.9 (10 Nov 2020 11:25), Max: 36.9 (10 Nov 2020 11:25)  T(F): 98.5 (10 Nov 2020 11:25), Max: 98.5 (10 Nov 2020 11:25)  HR: 93 (10 Nov 2020 11:25) (93 - 105)  BP: 136/84 (10 Nov 2020 11:25) (120/88 - 136/84)  BP(mean): --  ABP: --  ABP(mean): --  RR: 16 (10 Nov 2020 11:25) (16 - 16)  SpO2: 96% (10 Nov 2020 11:25) (94% - 96%)        11-09 @ 07:01  -  11-10 @ 07:00  --------------------------------------------------------  IN: 255 mL / OUT: 440 mL / NET: -185 mL    11-10 @ 07:01  -  11-10 @ 12:30  --------------------------------------------------------  IN: 0 mL / OUT: 550 mL / NET: -550 mL      CAPILLARY BLOOD GLUCOSE      POCT Blood Glucose.: 84 mg/dL (10 Nov 2020 11:51)      PHYSICAL EXAM:    General: NAD, answering questions slowly  HEENT: NC/AT; PERRL, clear conjunctiva, vEEG leads on  Neck: supple  Respiratory: CTA b/l  Cardiovascular: +S1/S2; RRR  Abdomen: soft, NT/ND; +BS x4  Extremities: WWP, 2+ peripheral pulses b/l, slight peripheral edam  Skin: normal color and turgor; no rash  Psych: flat affect  Neurologic:     -Mental Status: AAOx2 (name and location, thinks year is 1920). Speech is fluent with intact naming, but slow speech and some word finding difficulty. Follows commands. Attention/concentration intact. Fund of knowledge appropriate     -Cranial Nerves:          II: Visual fields are full to confrontation.          III, IV, VI: EOMI without nystagmus.          V:  Facial sensation V1-V3 equal and intact           VII: Face is symmetric with normal eye closure and smile          VIII: Hearing is bilaterally intact to finger rub          IX, X: Uvula is midline and soft palate rises symmetrically          XI: Head turning and shoulder shrug are intact.          XII: Tongue protrudes midline     -Motor: Normal bulk and tone. Strength bilateral upper extremity 3/5, bilateral lower extremities 2/5.      -Sensation: Intact to light touch bilaterally.       MEDICATIONS:  MEDICATIONS  (STANDING):  apixaban 2.5 milliGRAM(s) Oral every 12 hours  buPROPion  milliGRAM(s) Oral daily  cefTRIAXone   IVPB 2000 milliGRAM(s) IV Intermittent every 24 hours  chlorhexidine 2% Cloths 1 Application(s) Topical <User Schedule>  escitalopram 40 milliGRAM(s) Oral daily  famotidine    Tablet 20 milliGRAM(s) Oral daily  insulin lispro (ADMELOG) corrective regimen sliding scale   SubCutaneous every 6 hours  lactobacillus acidophilus 1 Tablet(s) Oral daily  levETIRAcetam 500 milliGRAM(s) Oral two times a day  oxybutynin 5 milliGRAM(s) Oral two times a day  pregabalin 150 milliGRAM(s) Oral two times a day  tamsulosin 0.8 milliGRAM(s) Oral at bedtime  valACYclovir 500 milliGRAM(s) Oral every 12 hours  verapamil  milliGRAM(s) Oral daily  vitamin B complex with vitamin C 1 Tablet(s) Oral daily    MEDICATIONS  (PRN):      ALLERGIES:  Allergies    No Known Allergies    Intolerances        LABS:                        13.3   9.14  )-----------( 190      ( 10 Nov 2020 11:21 )             40.9     11-10    143  |  107  |  10  ----------------------------<  88  3.7   |  21<L>  |  0.38<L>    Ca    9.0      10 Nov 2020 11:21  Phos  1.5     11-10  Mg     1.8     11-10    TPro  6.1  /  Alb  3.2<L>  /  TBili  0.2  /  DBili  x   /  AST  15  /  ALT  10  /  AlkPhos  64  11-10              Culture - Blood (collected 11-07-20 @ 23:52)  Source: .Blood Blood  Preliminary Report (11-10-20 @ 00:01):    No growth at 2 days.    Culture - Blood (collected 11-07-20 @ 23:52)  Source: .Blood Blood  Preliminary Report (11-10-20 @ 00:01):    No growth at 2 days.            RADIOLOGY & ADDITIONAL TESTS:     EEG independently viewed by Dr. Espinal    Daily Summary:    Improved EEG compared to prior study, no events occurred.  Rare sharps and spikes over the right temporal region.  Breach rhythm over the right hemisphere which is consistent with the known history of skull defect.  Mild to moderate slowing more prominent over the right hemisphere, suggestive of a similar degree of diffuse or multifocal cerebral dysfunction.    Trina Mena MD  Clinical Neurophysiology Fellow

## 2020-11-11 ENCOUNTER — TRANSCRIPTION ENCOUNTER (OUTPATIENT)
Age: 55
End: 2020-11-11

## 2020-11-11 VITALS
DIASTOLIC BLOOD PRESSURE: 86 MMHG | SYSTOLIC BLOOD PRESSURE: 142 MMHG | HEART RATE: 98 BPM | OXYGEN SATURATION: 95 % | TEMPERATURE: 98 F | RESPIRATION RATE: 16 BRPM

## 2020-11-11 DIAGNOSIS — R56.9 UNSPECIFIED CONVULSIONS: ICD-10-CM

## 2020-11-11 PROBLEM — I61.9 NONTRAUMATIC INTRACEREBRAL HEMORRHAGE, UNSPECIFIED: Chronic | Status: ACTIVE | Noted: 2020-11-08

## 2020-11-11 PROBLEM — G35 MULTIPLE SCLEROSIS: Chronic | Status: ACTIVE | Noted: 2020-11-07

## 2020-11-11 LAB
ANION GAP SERPL CALC-SCNC: 13 MMOL/L — SIGNIFICANT CHANGE UP (ref 5–17)
BUN SERPL-MCNC: 6 MG/DL — LOW (ref 7–23)
CALCIUM SERPL-MCNC: 8.6 MG/DL — SIGNIFICANT CHANGE UP (ref 8.4–10.5)
CHLORIDE SERPL-SCNC: 104 MMOL/L — SIGNIFICANT CHANGE UP (ref 96–108)
CO2 SERPL-SCNC: 24 MMOL/L — SIGNIFICANT CHANGE UP (ref 22–31)
CREAT SERPL-MCNC: 0.4 MG/DL — LOW (ref 0.5–1.3)
GLUCOSE BLDC GLUCOMTR-MCNC: 89 MG/DL — SIGNIFICANT CHANGE UP (ref 70–99)
GLUCOSE BLDC GLUCOMTR-MCNC: 92 MG/DL — SIGNIFICANT CHANGE UP (ref 70–99)
GLUCOSE SERPL-MCNC: 89 MG/DL — SIGNIFICANT CHANGE UP (ref 70–99)
HCT VFR BLD CALC: 39.8 % — SIGNIFICANT CHANGE UP (ref 34.5–45)
HGB BLD-MCNC: 13.2 G/DL — SIGNIFICANT CHANGE UP (ref 11.5–15.5)
MAGNESIUM SERPL-MCNC: 1.7 MG/DL — SIGNIFICANT CHANGE UP (ref 1.6–2.6)
MCHC RBC-ENTMCNC: 31.7 PG — SIGNIFICANT CHANGE UP (ref 27–34)
MCHC RBC-ENTMCNC: 33.2 GM/DL — SIGNIFICANT CHANGE UP (ref 32–36)
MCV RBC AUTO: 95.4 FL — SIGNIFICANT CHANGE UP (ref 80–100)
NRBC # BLD: 0 /100 WBCS — SIGNIFICANT CHANGE UP (ref 0–0)
PHOSPHATE SERPL-MCNC: 2.2 MG/DL — LOW (ref 2.5–4.5)
PLATELET # BLD AUTO: 196 K/UL — SIGNIFICANT CHANGE UP (ref 150–400)
POTASSIUM SERPL-MCNC: 3.6 MMOL/L — SIGNIFICANT CHANGE UP (ref 3.5–5.3)
POTASSIUM SERPL-SCNC: 3.6 MMOL/L — SIGNIFICANT CHANGE UP (ref 3.5–5.3)
RBC # BLD: 4.17 M/UL — SIGNIFICANT CHANGE UP (ref 3.8–5.2)
RBC # FLD: 12.6 % — SIGNIFICANT CHANGE UP (ref 10.3–14.5)
SODIUM SERPL-SCNC: 141 MMOL/L — SIGNIFICANT CHANGE UP (ref 135–145)
WBC # BLD: 10.14 K/UL — SIGNIFICANT CHANGE UP (ref 3.8–10.5)
WBC # FLD AUTO: 10.14 K/UL — SIGNIFICANT CHANGE UP (ref 3.8–10.5)

## 2020-11-11 PROCEDURE — 85610 PROTHROMBIN TIME: CPT

## 2020-11-11 PROCEDURE — 70498 CT ANGIOGRAPHY NECK: CPT

## 2020-11-11 PROCEDURE — 51702 INSERT TEMP BLADDER CATH: CPT | Mod: XU

## 2020-11-11 PROCEDURE — 95711 VEEG 2-12 HR UNMONITORED: CPT

## 2020-11-11 PROCEDURE — 83615 LACTATE (LD) (LDH) ENZYME: CPT

## 2020-11-11 PROCEDURE — 82962 GLUCOSE BLOOD TEST: CPT

## 2020-11-11 PROCEDURE — 80048 BASIC METABOLIC PNL TOTAL CA: CPT

## 2020-11-11 PROCEDURE — 74177 CT ABD & PELVIS W/CONTRAST: CPT

## 2020-11-11 PROCEDURE — 94640 AIRWAY INHALATION TREATMENT: CPT

## 2020-11-11 PROCEDURE — 87635 SARS-COV-2 COVID-19 AMP PRB: CPT

## 2020-11-11 PROCEDURE — 97530 THERAPEUTIC ACTIVITIES: CPT

## 2020-11-11 PROCEDURE — 82803 BLOOD GASES ANY COMBINATION: CPT

## 2020-11-11 PROCEDURE — 85027 COMPLETE CBC AUTOMATED: CPT

## 2020-11-11 PROCEDURE — 80053 COMPREHEN METABOLIC PANEL: CPT

## 2020-11-11 PROCEDURE — 92610 EVALUATE SWALLOWING FUNCTION: CPT

## 2020-11-11 PROCEDURE — 81001 URINALYSIS AUTO W/SCOPE: CPT

## 2020-11-11 PROCEDURE — 31500 INSERT EMERGENCY AIRWAY: CPT

## 2020-11-11 PROCEDURE — 87086 URINE CULTURE/COLONY COUNT: CPT

## 2020-11-11 PROCEDURE — 87070 CULTURE OTHR SPECIMN AEROBIC: CPT

## 2020-11-11 PROCEDURE — 80307 DRUG TEST PRSMV CHEM ANLYZR: CPT

## 2020-11-11 PROCEDURE — 99239 HOSP IP/OBS DSCHRG MGMT >30: CPT

## 2020-11-11 PROCEDURE — 99231 SBSQ HOSP IP/OBS SF/LOW 25: CPT

## 2020-11-11 PROCEDURE — 96366 THER/PROPH/DIAG IV INF ADDON: CPT

## 2020-11-11 PROCEDURE — 95714 VEEG EA 12-26 HR UNMNTR: CPT

## 2020-11-11 PROCEDURE — 82728 ASSAY OF FERRITIN: CPT

## 2020-11-11 PROCEDURE — 85379 FIBRIN DEGRADATION QUANT: CPT

## 2020-11-11 PROCEDURE — 84443 ASSAY THYROID STIM HORMONE: CPT

## 2020-11-11 PROCEDURE — 84145 PROCALCITONIN (PCT): CPT

## 2020-11-11 PROCEDURE — 84484 ASSAY OF TROPONIN QUANT: CPT

## 2020-11-11 PROCEDURE — 86803 HEPATITIS C AB TEST: CPT

## 2020-11-11 PROCEDURE — 96375 TX/PRO/DX INJ NEW DRUG ADDON: CPT | Mod: XU

## 2020-11-11 PROCEDURE — 36415 COLL VENOUS BLD VENIPUNCTURE: CPT

## 2020-11-11 PROCEDURE — 0042T: CPT

## 2020-11-11 PROCEDURE — 70450 CT HEAD/BRAIN W/O DYE: CPT

## 2020-11-11 PROCEDURE — 97161 PT EVAL LOW COMPLEX 20 MIN: CPT

## 2020-11-11 PROCEDURE — 82550 ASSAY OF CK (CPK): CPT

## 2020-11-11 PROCEDURE — 71045 X-RAY EXAM CHEST 1 VIEW: CPT

## 2020-11-11 PROCEDURE — 85730 THROMBOPLASTIN TIME PARTIAL: CPT

## 2020-11-11 PROCEDURE — 84100 ASSAY OF PHOSPHORUS: CPT

## 2020-11-11 PROCEDURE — 82607 VITAMIN B-12: CPT

## 2020-11-11 PROCEDURE — 83605 ASSAY OF LACTIC ACID: CPT

## 2020-11-11 PROCEDURE — 86140 C-REACTIVE PROTEIN: CPT

## 2020-11-11 PROCEDURE — 92526 ORAL FUNCTION THERAPY: CPT

## 2020-11-11 PROCEDURE — 93005 ELECTROCARDIOGRAM TRACING: CPT | Mod: XU

## 2020-11-11 PROCEDURE — 83880 ASSAY OF NATRIURETIC PEPTIDE: CPT

## 2020-11-11 PROCEDURE — 85384 FIBRINOGEN ACTIVITY: CPT

## 2020-11-11 PROCEDURE — 87186 SC STD MICRODIL/AGAR DIL: CPT

## 2020-11-11 PROCEDURE — 70496 CT ANGIOGRAPHY HEAD: CPT

## 2020-11-11 PROCEDURE — 96365 THER/PROPH/DIAG IV INF INIT: CPT | Mod: XU

## 2020-11-11 PROCEDURE — 87040 BLOOD CULTURE FOR BACTERIA: CPT

## 2020-11-11 PROCEDURE — 83735 ASSAY OF MAGNESIUM: CPT

## 2020-11-11 PROCEDURE — 86780 TREPONEMA PALLIDUM: CPT

## 2020-11-11 PROCEDURE — 94002 VENT MGMT INPAT INIT DAY: CPT

## 2020-11-11 PROCEDURE — 99285 EMERGENCY DEPT VISIT HI MDM: CPT | Mod: 25

## 2020-11-11 PROCEDURE — 95700 EEG CONT REC W/VID EEG TECH: CPT

## 2020-11-11 PROCEDURE — 85025 COMPLETE CBC W/AUTO DIFF WBC: CPT

## 2020-11-11 RX ORDER — MAGNESIUM SULFATE 500 MG/ML
2 VIAL (ML) INJECTION ONCE
Refills: 0 | Status: COMPLETED | OUTPATIENT
Start: 2020-11-11 | End: 2020-11-11

## 2020-11-11 RX ORDER — POTASSIUM CHLORIDE 20 MEQ
40 PACKET (EA) ORAL ONCE
Refills: 0 | Status: COMPLETED | OUTPATIENT
Start: 2020-11-11 | End: 2020-11-11

## 2020-11-11 RX ORDER — CEPHALEXIN 500 MG
1 CAPSULE ORAL
Qty: 8 | Refills: 0
Start: 2020-11-11

## 2020-11-11 RX ORDER — LEVETIRACETAM 250 MG/1
1 TABLET, FILM COATED ORAL
Qty: 60 | Refills: 0
Start: 2020-11-11

## 2020-11-11 RX ORDER — CEPHALEXIN 500 MG
1 CAPSULE ORAL
Qty: 6 | Refills: 0
Start: 2020-11-11

## 2020-11-11 RX ADMIN — Medication 150 MILLIGRAM(S): at 07:13

## 2020-11-11 RX ADMIN — APIXABAN 2.5 MILLIGRAM(S): 2.5 TABLET, FILM COATED ORAL at 07:57

## 2020-11-11 RX ADMIN — Medication 40 MILLIEQUIVALENT(S): at 11:28

## 2020-11-11 RX ADMIN — ESCITALOPRAM OXALATE 40 MILLIGRAM(S): 10 TABLET, FILM COATED ORAL at 11:28

## 2020-11-11 RX ADMIN — Medication 50 GRAM(S): at 11:24

## 2020-11-11 RX ADMIN — VALACYCLOVIR 500 MILLIGRAM(S): 500 TABLET, FILM COATED ORAL at 07:13

## 2020-11-11 RX ADMIN — Medication 1 TABLET(S): at 11:28

## 2020-11-11 RX ADMIN — BUPROPION HYDROCHLORIDE 200 MILLIGRAM(S): 150 TABLET, EXTENDED RELEASE ORAL at 11:28

## 2020-11-11 RX ADMIN — Medication 1 TABLET(S): at 11:27

## 2020-11-11 RX ADMIN — CHLORHEXIDINE GLUCONATE 1 APPLICATION(S): 213 SOLUTION TOPICAL at 07:16

## 2020-11-11 RX ADMIN — FAMOTIDINE 20 MILLIGRAM(S): 10 INJECTION INTRAVENOUS at 11:28

## 2020-11-11 RX ADMIN — Medication 5 MILLIGRAM(S): at 07:14

## 2020-11-11 RX ADMIN — Medication 120 MILLIGRAM(S): at 09:43

## 2020-11-11 RX ADMIN — LEVETIRACETAM 500 MILLIGRAM(S): 250 TABLET, FILM COATED ORAL at 07:14

## 2020-11-11 NOTE — PROGRESS NOTE ADULT - ASSESSMENT
55-year-old woman with MS and remote intracranial hemorrhage p/w unresponsiveness requiring intubation, now extubated and on antibiotics for urosepsis.  Mental status has continued to improve and she is likely at her baseline.    Recommendations:  - stop EEG  - c/w Keppra 500 mg BID for now due to right temporal epileptiform discharges earlier in admission, will continue until outpatient follow up appointment but will likely not need long term  - treat underlying UTI as per primary team    Plan discussed with Dr. Espinal. Neurology will sign off.

## 2020-11-11 NOTE — PROGRESS NOTE ADULT - SUBJECTIVE AND OBJECTIVE BOX
INTERVAL HPI/OVERNIGHT EVENTS: no acute events    SUBJECTIVE: Patient seen and examined at bedside. No acute complaints, feels about the same as yesterday. Feels that her cognition is at baseline.    OBJECTIVE:    VITAL SIGNS:  ICU Vital Signs Last 24 Hrs  T(C): 36.9 (10 Nov 2020 11:25), Max: 36.9 (10 Nov 2020 11:25)  T(F): 98.5 (10 Nov 2020 11:25), Max: 98.5 (10 Nov 2020 11:25)  HR: 93 (10 Nov 2020 11:25) (93 - 105)  BP: 136/84 (10 Nov 2020 11:25) (120/88 - 136/84)  BP(mean): --  ABP: --  ABP(mean): --  RR: 16 (10 Nov 2020 11:25) (16 - 16)  SpO2: 96% (10 Nov 2020 11:25) (94% - 96%)        11-09 @ 07:01  -  11-10 @ 07:00  --------------------------------------------------------  IN: 255 mL / OUT: 440 mL / NET: -185 mL    11-10 @ 07:01  -  11-10 @ 12:30  --------------------------------------------------------  IN: 0 mL / OUT: 550 mL / NET: -550 mL      CAPILLARY BLOOD GLUCOSE      POCT Blood Glucose.: 84 mg/dL (10 Nov 2020 11:51)      PHYSICAL EXAM:    General: NAD, answering questions slowly  HEENT: NC/AT; PERRL, clear conjunctiva, vEEG leads on  Neck: supple  Respiratory: CTA b/l  Cardiovascular: +S1/S2; RRR  Abdomen: soft, NT/ND; +BS x4  Extremities: WWP, 2+ peripheral pulses b/l, slight peripheral edam  Skin: normal color and turgor; no rash  Psych: flat affect  Neurologic:     -Mental Status: AAOx2 (name, hospital but unsure which one, 2020). Speech is fluent with intact naming, but slow speech and some word finding difficulty. Follows commands. Attention/concentration intact. Fund of knowledge appropriate     -Cranial Nerves:          II: Visual fields are full to confrontation.          III, IV, VI: EOMI without nystagmus.          V:  Facial sensation V1-V3 equal and intact           VII: Face is symmetric with normal eye closure and smile          VIII: Hearing is bilaterally intact to finger rub          IX, X: Uvula is midline and soft palate rises symmetrically          XI: Head turning and shoulder shrug are intact.          XII: Tongue protrudes midline     -Motor: Normal bulk and tone. Strength bilateral upper extremity 3/5, bilateral lower extremities 2/5.      -Sensation: Intact to light touch bilaterally.       MEDICATIONS:  MEDICATIONS  (STANDING):  apixaban 2.5 milliGRAM(s) Oral every 12 hours  buPROPion  milliGRAM(s) Oral daily  cefTRIAXone   IVPB 2000 milliGRAM(s) IV Intermittent every 24 hours  chlorhexidine 2% Cloths 1 Application(s) Topical <User Schedule>  escitalopram 40 milliGRAM(s) Oral daily  famotidine    Tablet 20 milliGRAM(s) Oral daily  insulin lispro (ADMELOG) corrective regimen sliding scale   SubCutaneous every 6 hours  lactobacillus acidophilus 1 Tablet(s) Oral daily  levETIRAcetam 500 milliGRAM(s) Oral two times a day  oxybutynin 5 milliGRAM(s) Oral two times a day  pregabalin 150 milliGRAM(s) Oral two times a day  tamsulosin 0.8 milliGRAM(s) Oral at bedtime  valACYclovir 500 milliGRAM(s) Oral every 12 hours  verapamil  milliGRAM(s) Oral daily  vitamin B complex with vitamin C 1 Tablet(s) Oral daily    MEDICATIONS  (PRN):      ALLERGIES:  Allergies    No Known Allergies    Intolerances        LABS:                        13.3   9.14  )-----------( 190      ( 10 Nov 2020 11:21 )             40.9     11-10    143  |  107  |  10  ----------------------------<  88  3.7   |  21<L>  |  0.38<L>    Ca    9.0      10 Nov 2020 11:21  Phos  1.5     11-10  Mg     1.8     11-10    TPro  6.1  /  Alb  3.2<L>  /  TBili  0.2  /  DBili  x   /  AST  15  /  ALT  10  /  AlkPhos  64  11-10              Culture - Blood (collected 11-07-20 @ 23:52)  Source: .Blood Blood  Preliminary Report (11-10-20 @ 00:01):    No growth at 2 days.    Culture - Blood (collected 11-07-20 @ 23:52)  Source: .Blood Blood  Preliminary Report (11-10-20 @ 00:01):    No growth at 2 days.

## 2020-11-11 NOTE — DISCHARGE NOTE NURSING/CASE MANAGEMENT/SOCIAL WORK - NSDCPEPTSTRK_GEN_ALL_CORE
Prescribed medications/Stroke support groups for patients, families, and friends/Call 911 for stroke/Need for follow up after discharge/Risk factors for stroke/Stroke education booklet/Stroke warning signs and symptoms/Signs and symptoms of stroke

## 2020-11-11 NOTE — PROGRESS NOTE ADULT - SUBJECTIVE AND OBJECTIVE BOX
INTERVAL HPI/OVERNIGHT EVENTS:  Patient was seen and examined at bedside. As per nurse and patient, no o/n events, patient resting comfortably. No complaints at this time. Patient denies: fever, chills, dizziness, weakness, HA, Changes in vision, CP, palpitations, SOB, cough, N/V/D/C, dysuria, changes in bowel movements, LE edema. ROS otherwise negative.    VITAL SIGNS:  T(F): 98.3 (11-11-20 @ 05:10)  HR: 97 (11-11-20 @ 05:10)  BP: 137/83 (11-11-20 @ 05:10)  RR: 16 (11-11-20 @ 05:10)  SpO2: 95% (11-11-20 @ 05:10)  Wt(kg): --    PHYSICAL EXAM:    Constitutional: WDWN, NAD  HEENT: PERRL, EOMI, sclera non-icteric, neck supple, trachea midline, no masses, no JVD, MMM, good dentition  Respiratory: CTA b/l, good air entry b/l, no wheezing, no rhonchi, no rales, without accessory muscle use and no intercostal retractions  Cardiovascular: RRR, normal S1S2, no M/R/G  Gastrointestinal: soft, NTND, no masses palpable, BS normal  Extremities: Warm, well perfused, pulses equal bilateral upper and lower extremities, no edema, no clubbing  Neurological: AAOx3, CN Grossly intact  Skin: Normal temperature, warm, dry    MEDICATIONS  (STANDING):  apixaban 2.5 milliGRAM(s) Oral every 12 hours  buPROPion  milliGRAM(s) Oral daily  cefTRIAXone   IVPB 2000 milliGRAM(s) IV Intermittent every 24 hours  chlorhexidine 2% Cloths 1 Application(s) Topical <User Schedule>  escitalopram 40 milliGRAM(s) Oral daily  famotidine    Tablet 20 milliGRAM(s) Oral daily  insulin lispro (ADMELOG) corrective regimen sliding scale   SubCutaneous every 6 hours  lactobacillus acidophilus 1 Tablet(s) Oral daily  levETIRAcetam 500 milliGRAM(s) Oral two times a day  oxybutynin 5 milliGRAM(s) Oral two times a day  pregabalin 150 milliGRAM(s) Oral two times a day  tamsulosin 0.8 milliGRAM(s) Oral at bedtime  valACYclovir 500 milliGRAM(s) Oral every 12 hours  verapamil  milliGRAM(s) Oral daily  vitamin B complex with vitamin C 1 Tablet(s) Oral daily    MEDICATIONS  (PRN):      Allergies    No Known Allergies    Intolerances        LABS:                        13.2   10.14 )-----------( 196      ( 11 Nov 2020 07:32 )             39.8     11-11    141  |  104  |  6<L>  ----------------------------<  89  3.6   |  24  |  0.40<L>    Ca    8.6      11 Nov 2020 07:32  Phos  2.2     11-11  Mg     1.7     11-11    TPro  6.1  /  Alb  3.2<L>  /  TBili  0.2  /  DBili  x   /  AST  15  /  ALT  10  /  AlkPhos  64  11-10            RADIOLOGY & ADDITIONAL TESTS:  Reviewed

## 2020-11-11 NOTE — PROGRESS NOTE ADULT - SUBJECTIVE AND OBJECTIVE BOX
Patient is a 55y old  Female who presents with a chief complaint of TME (11 Nov 2020 10:37)      INTERVAL HPI/OVERNIGHT EVENTS:    Pt. seen and examined this morning  Pt. feels well, has no complaints, ordering lunch w/ PCA assistance  Pt. happy about going home later today    Review of Systems: 12 point review of systems otherwise negative    MEDICATIONS  (STANDING):  apixaban 2.5 milliGRAM(s) Oral every 12 hours  buPROPion  milliGRAM(s) Oral daily  cefTRIAXone   IVPB 2000 milliGRAM(s) IV Intermittent every 24 hours  chlorhexidine 2% Cloths 1 Application(s) Topical <User Schedule>  escitalopram 40 milliGRAM(s) Oral daily  famotidine    Tablet 20 milliGRAM(s) Oral daily  insulin lispro (ADMELOG) corrective regimen sliding scale   SubCutaneous every 6 hours  lactobacillus acidophilus 1 Tablet(s) Oral daily  levETIRAcetam 500 milliGRAM(s) Oral two times a day  magnesium sulfate  IVPB 2 Gram(s) IV Intermittent once  oxybutynin 5 milliGRAM(s) Oral two times a day  potassium chloride   Powder 40 milliEquivalent(s) Oral once  pregabalin 150 milliGRAM(s) Oral two times a day  sodium phosphate IVPB 30 milliMole(s) IV Intermittent once  tamsulosin 0.8 milliGRAM(s) Oral at bedtime  valACYclovir 500 milliGRAM(s) Oral every 12 hours  verapamil  milliGRAM(s) Oral daily  vitamin B complex with vitamin C 1 Tablet(s) Oral daily    MEDICATIONS  (PRN):      Allergies    No Known Allergies    Intolerances          Vital Signs Last 24 Hrs  T(C): 36.8 (11 Nov 2020 05:10), Max: 36.8 (11 Nov 2020 05:10)  T(F): 98.3 (11 Nov 2020 05:10), Max: 98.3 (11 Nov 2020 05:10)  HR: 97 (11 Nov 2020 05:10) (97 - 97)  BP: 137/83 (11 Nov 2020 05:10) (137/83 - 137/83)  BP(mean): --  RR: 16 (11 Nov 2020 05:10) (16 - 16)  SpO2: 95% (11 Nov 2020 05:10) (95% - 95%)  CAPILLARY BLOOD GLUCOSE      POCT Blood Glucose.: 92 mg/dL (11 Nov 2020 05:56)  POCT Blood Glucose.: 94 mg/dL (10 Nov 2020 16:59)  POCT Blood Glucose.: 84 mg/dL (10 Nov 2020 11:51)      11-10 @ 07:01  -  11-11 @ 07:00  --------------------------------------------------------  IN: 0 mL / OUT: 1000 mL / NET: -1000 mL        Physical Exam:  (this morning)  Daily     Daily   General: comfortable-appearing in NAD  Skin:  WWP  Neuro:  AAOx2-3, slow speech    LABS:                        13.2   10.14 )-----------( 196      ( 11 Nov 2020 07:32 )             39.8     11-11    141  |  104  |  6<L>  ----------------------------<  89  3.6   |  24  |  0.40<L>    Ca    8.6      11 Nov 2020 07:32  Phos  2.2     11-11  Mg     1.7     11-11    TPro  6.1  /  Alb  3.2<L>  /  TBili  0.2  /  DBili  x   /  AST  15  /  ALT  10  /  AlkPhos  64  11-10            RADIOLOGY & ADDITIONAL TESTS:    ---------------------------------------------------------------------------  I personally reviewed: [  ]EKG   [  ]CXR    [  ] CT    [  ]Other  ---------------------------------------------------------------------------  PLEASE CHECK WHEN PRESENT:     [  ]Heart Failure     [  ] Acute     [  ] Acute on Chronic     [  ] Chronic  -------------------------------------------------------------------     [  ]Diastolic [HFpEF]     [  ]Systolic [HFrEF]     [  ]Combined [HFpEF & HFrEF]     [  ]Other:  -------------------------------------------------------------------  [  ]MELL     [  ]ATN     [  ]Reneal Medullary Necrosis     [  ]Renal Cortical Necrosis     [  ]Other Pathological Lesions:    [  ]CKD 1  [  ]CKD 2  [  ]CKD 3  [  ]CKD 4  [  ]CKD 5  [  ]Other  -------------------------------------------------------------------  [  ]Other/Unspecified:    --------------------------------------------------------------------    Abdominal Nutritional Status  [  ]Malnutrition: See Nutrition Note  [  ]Cachexia  [  ]Other:   [  ]Supplement Ordered:  [  ]Morbid Obesity (BMI >=40]

## 2020-11-11 NOTE — DISCHARGE NOTE NURSING/CASE MANAGEMENT/SOCIAL WORK - PATIENT PORTAL LINK FT
You can access the FollowMyHealth Patient Portal offered by [facility] by registering at the following website: [email or web address]. By joining [facility]’s FollowMyHealth portal, you will also be able to view your health information using other applications (apps) compatible with our system.

## 2020-11-11 NOTE — DISCHARGE NOTE NURSING/CASE MANAGEMENT/SOCIAL WORK - NSDCFUADDAPPT_GEN_ALL_CORE_FT
Please follow up with your neurologist, Dr. Espinal on 11/23 at 2:20 PM, an appointment has been made- please bring your insurance card with you. If you need to reschedule please call the phone number provided.     Please follow up with your Primary care doctor within 1-2 weeks of discharge.

## 2020-11-11 NOTE — PROGRESS NOTE ADULT - ATTENDING COMMENTS
Pt. seen and examined by me on 11/9, I have read Dr. Contreras's note, I agree w/ her findings and plan of care as documented
Seen at bedside with family on 11/9  Agree with above
Dispo: home w/ home care services, pending EEG
Dispo: d/c home today w/ home care services, outpatient Neuro f/u

## 2020-11-11 NOTE — PROGRESS NOTE ADULT - PROBLEM SELECTOR PLAN 3
likely multifactorial, d/t UTI and seizure, in setting of ICH hx, THC use (Utox+); mental status improving to baseline; cont. mgmt as above, frequent re-orientation

## 2020-11-12 PROBLEM — Z00.00 ENCOUNTER FOR PREVENTIVE HEALTH EXAMINATION: Status: ACTIVE | Noted: 2020-11-12

## 2020-11-13 LAB
CULTURE RESULTS: SIGNIFICANT CHANGE UP
CULTURE RESULTS: SIGNIFICANT CHANGE UP
SPECIMEN SOURCE: SIGNIFICANT CHANGE UP
SPECIMEN SOURCE: SIGNIFICANT CHANGE UP

## 2020-11-23 ENCOUNTER — APPOINTMENT (OUTPATIENT)
Dept: NEUROLOGY | Facility: CLINIC | Age: 55
End: 2020-11-23
Payer: MEDICARE

## 2020-11-23 VITALS
BODY MASS INDEX: 25.83 KG/M2 | DIASTOLIC BLOOD PRESSURE: 46 MMHG | OXYGEN SATURATION: 99 % | SYSTOLIC BLOOD PRESSURE: 82 MMHG | TEMPERATURE: 97.7 F | HEART RATE: 71 BPM | WEIGHT: 155 LBS | HEIGHT: 65 IN

## 2020-11-23 DIAGNOSIS — R94.01 ABNORMAL ELECTROENCEPHALOGRAM [EEG]: ICD-10-CM

## 2020-11-23 DIAGNOSIS — Z78.9 OTHER SPECIFIED HEALTH STATUS: ICD-10-CM

## 2020-11-23 DIAGNOSIS — G25.81 RESTLESS LEGS SYNDROME: ICD-10-CM

## 2020-11-23 DIAGNOSIS — I10 ESSENTIAL (PRIMARY) HYPERTENSION: ICD-10-CM

## 2020-11-23 DIAGNOSIS — G35 MULTIPLE SCLEROSIS: ICD-10-CM

## 2020-11-23 DIAGNOSIS — Z86.79 PERSONAL HISTORY OF OTHER DISEASES OF THE CIRCULATORY SYSTEM: ICD-10-CM

## 2020-11-23 PROCEDURE — 99214 OFFICE O/P EST MOD 30 MIN: CPT

## 2020-11-23 RX ORDER — OXYBUTYNIN CHLORIDE 10 MG/1
10 TABLET, EXTENDED RELEASE ORAL DAILY
Refills: 0 | Status: ACTIVE | COMMUNITY
Start: 2020-11-18

## 2020-11-23 RX ORDER — SUMATRIPTAN 100 MG/1
100 TABLET, FILM COATED ORAL
Qty: 12 | Refills: 0 | Status: DISCONTINUED | COMMUNITY
Start: 2020-11-17 | End: 2020-11-23

## 2020-11-23 RX ORDER — TAMSULOSIN HYDROCHLORIDE 0.4 MG/1
0.4 CAPSULE ORAL
Qty: 30 | Refills: 0 | Status: ACTIVE | COMMUNITY
Start: 2020-11-03

## 2020-11-23 RX ORDER — PREGABALIN 150 MG/1
150 CAPSULE ORAL
Qty: 60 | Refills: 0 | Status: ACTIVE | COMMUNITY
Start: 2020-11-20

## 2020-11-23 RX ORDER — CLONAZEPAM 0.5 MG/1
0.5 TABLET ORAL AT BEDTIME
Qty: 30 | Refills: 0 | Status: ACTIVE | COMMUNITY
Start: 2020-11-23

## 2020-11-23 RX ORDER — ESCITALOPRAM OXALATE 20 MG/1
20 TABLET ORAL
Refills: 0 | Status: ACTIVE | COMMUNITY
Start: 2020-11-03

## 2020-11-23 RX ORDER — LEVETIRACETAM 250 MG/1
250 TABLET, FILM COATED ORAL TWICE DAILY
Qty: 30 | Refills: 3 | Status: ACTIVE | COMMUNITY
Start: 2020-11-23 | End: 1900-01-01

## 2020-11-23 RX ORDER — APIXABAN 2.5 MG/1
2.5 TABLET, FILM COATED ORAL TWICE DAILY
Refills: 0 | Status: ACTIVE | COMMUNITY
Start: 2020-11-03

## 2020-11-23 RX ORDER — LEVETIRACETAM 500 MG/1
500 TABLET, FILM COATED ORAL
Qty: 60 | Refills: 0 | Status: DISCONTINUED | COMMUNITY
Start: 2020-11-11 | End: 2020-11-23

## 2020-11-23 RX ORDER — VALACYCLOVIR 500 MG/1
500 TABLET, FILM COATED ORAL TWICE DAILY
Refills: 0 | Status: ACTIVE | COMMUNITY
Start: 2020-11-03

## 2020-11-23 RX ORDER — VERAPAMIL HYDROCHLORIDE 120 MG/1
120 CAPSULE, EXTENDED RELEASE ORAL DAILY
Refills: 0 | Status: ACTIVE | COMMUNITY
Start: 2020-11-03

## 2020-11-23 RX ORDER — BACLOFEN 20 MG/1
20 TABLET ORAL ONCE
Refills: 0 | Status: ACTIVE | COMMUNITY
Start: 2020-11-17

## 2020-11-23 RX ORDER — METHENAMINE HIPPURATE 1 G/1
1 TABLET ORAL DAILY
Refills: 0 | Status: ACTIVE | COMMUNITY
Start: 2020-11-03

## 2020-11-23 RX ORDER — BUPROPION HYDROCHLORIDE 200 MG/1
200 TABLET, FILM COATED, EXTENDED RELEASE ORAL DAILY
Refills: 0 | Status: ACTIVE | COMMUNITY
Start: 2020-11-18

## 2020-11-23 NOTE — ASSESSMENT
[FreeTextEntry1] : Recent episode of unresponsiveness i/s/o UTI, followed by epileptiform activity on EEG, in a patient with known prior brain injury (intracranial hemorrhage) -- likely provoked seizure\par \par -Discussed risks and benefits of continuing AEDs as she has never had an unprovoked seizure but is at high risk for seizure recurrence 2/2 prior ICH and frequent UTIs \par -Patient prefers to stay on Keppra, will lower dose to 250 mg BID, she will let me know if she develops any side effects\par \par Will also refer to Dr. Yu as she is looking for a new MS doctor.\par \par RTC 2-3 months

## 2020-11-23 NOTE — PHYSICAL EXAM
[FreeTextEntry1] : Physical Exam\par Constitutional: no apparent distress\par Psychiatric: normal affect, euthymic, alert and oriented x 3\par \par Neurologic Exam:\par Mental Status: awake and alert, speech fluent and prosodic with no paraphasic errors\par Cranial Nerves: pupils equal, tracks in all directions with lateral end-gaze nystagmus bilaterally, face symmetric, no dysarthria\par Motor: normal bulk and tone, no orbiting or pronator drift, plegic in LE bilaterally (baseline)\par Coordination: dysmetria on finger-nose-finger bilaterally\par Gait: wheelchair bound\par

## 2020-11-23 NOTE — REVIEW OF SYSTEMS
[FreeTextEntry1] : see intake sheet, reviewed with patient and , all unchecked systems reviewed and negative

## 2020-11-23 NOTE — HISTORY OF PRESENT ILLNESS
[FreeTextEntry1] : 55-year-old woman with MS (not on treatment) and prior intracranial hemorrhage recently admitted to [facility] for altered mental status (severe -- required intubation, though <24 hours), found to have UTI, but persistently confused despite UTI treatment.  Neurology was consulted and EEG showed right temporal epileptiform discharges -- unclear whether these were due to recent seizure leading to her unresponsiveness, or just indicators of underlying epileptic potential.  Keppra 500 mg BID was started.  Mental status gradually improved prior to discharge.\par \par Has continued on Keppra 500 mg BID since discharge.  No seizures.  No side effects -- denies anxiety, irritability, low mood.

## 2020-11-24 DIAGNOSIS — K22.0 ACHALASIA OF CARDIA: ICD-10-CM

## 2020-11-24 DIAGNOSIS — F12.10 CANNABIS ABUSE, UNCOMPLICATED: ICD-10-CM

## 2020-11-24 DIAGNOSIS — N32.89 OTHER SPECIFIED DISORDERS OF BLADDER: ICD-10-CM

## 2020-11-24 DIAGNOSIS — Z74.01 BED CONFINEMENT STATUS: ICD-10-CM

## 2020-11-24 DIAGNOSIS — G35 MULTIPLE SCLEROSIS: ICD-10-CM

## 2020-11-24 DIAGNOSIS — B96.20 UNSPECIFIED ESCHERICHIA COLI [E. COLI] AS THE CAUSE OF DISEASES CLASSIFIED ELSEWHERE: ICD-10-CM

## 2020-11-24 DIAGNOSIS — I45.10 UNSPECIFIED RIGHT BUNDLE-BRANCH BLOCK: ICD-10-CM

## 2020-11-24 DIAGNOSIS — Z79.01 LONG TERM (CURRENT) USE OF ANTICOAGULANTS: ICD-10-CM

## 2020-11-24 DIAGNOSIS — G40.909 EPILEPSY, UNSPECIFIED, NOT INTRACTABLE, WITHOUT STATUS EPILEPTICUS: ICD-10-CM

## 2020-11-24 DIAGNOSIS — E83.39 OTHER DISORDERS OF PHOSPHORUS METABOLISM: ICD-10-CM

## 2020-11-24 DIAGNOSIS — N39.0 URINARY TRACT INFECTION, SITE NOT SPECIFIED: ICD-10-CM

## 2020-11-24 DIAGNOSIS — Z86.718 PERSONAL HISTORY OF OTHER VENOUS THROMBOSIS AND EMBOLISM: ICD-10-CM

## 2020-11-24 DIAGNOSIS — K56.7 ILEUS, UNSPECIFIED: ICD-10-CM

## 2020-11-24 DIAGNOSIS — E87.2 ACIDOSIS: ICD-10-CM

## 2020-11-24 DIAGNOSIS — I10 ESSENTIAL (PRIMARY) HYPERTENSION: ICD-10-CM

## 2020-11-24 DIAGNOSIS — R94.31 ABNORMAL ELECTROCARDIOGRAM [ECG] [EKG]: ICD-10-CM

## 2020-11-24 DIAGNOSIS — K56.51 INTESTINAL ADHESIONS [BANDS], WITH PARTIAL OBSTRUCTION: ICD-10-CM

## 2020-11-24 DIAGNOSIS — F32.9 MAJOR DEPRESSIVE DISORDER, SINGLE EPISODE, UNSPECIFIED: ICD-10-CM

## 2020-11-24 DIAGNOSIS — R40.2430 GLASGOW COMA SCALE SCORE 3-8, UNSPECIFIED TIME: ICD-10-CM

## 2020-11-24 DIAGNOSIS — J96.00 ACUTE RESPIRATORY FAILURE, UNSPECIFIED WHETHER WITH HYPOXIA OR HYPERCAPNIA: ICD-10-CM

## 2020-11-24 DIAGNOSIS — G93.89 OTHER SPECIFIED DISORDERS OF BRAIN: ICD-10-CM

## 2020-11-24 DIAGNOSIS — R40.20 UNSPECIFIED COMA: ICD-10-CM

## 2020-11-24 DIAGNOSIS — G92 TOXIC ENCEPHALOPATHY: ICD-10-CM

## 2020-11-24 DIAGNOSIS — R29.730 NIHSS SCORE 30: ICD-10-CM

## 2020-12-23 ENCOUNTER — APPOINTMENT (OUTPATIENT)
Dept: NEUROLOGY | Facility: CLINIC | Age: 55
End: 2020-12-23

## 2020-12-27 ENCOUNTER — INPATIENT (INPATIENT)
Facility: HOSPITAL | Age: 55
LOS: 2 days | Discharge: HOME | DRG: 871 | End: 2020-12-30
Attending: STUDENT IN AN ORGANIZED HEALTH CARE EDUCATION/TRAINING PROGRAM | Admitting: HOSPITALIST
Payer: MEDICARE

## 2020-12-27 VITALS
SYSTOLIC BLOOD PRESSURE: 102 MMHG | HEART RATE: 103 BPM | HEIGHT: 64 IN | OXYGEN SATURATION: 100 % | RESPIRATION RATE: 22 BRPM | DIASTOLIC BLOOD PRESSURE: 65 MMHG | TEMPERATURE: 99 F | WEIGHT: 149.91 LBS

## 2020-12-27 DIAGNOSIS — R63.8 OTHER SYMPTOMS AND SIGNS CONCERNING FOOD AND FLUID INTAKE: ICD-10-CM

## 2020-12-27 DIAGNOSIS — G92 TOXIC ENCEPHALOPATHY: ICD-10-CM

## 2020-12-27 DIAGNOSIS — U07.1 COVID-19: ICD-10-CM

## 2020-12-27 DIAGNOSIS — R56.9 UNSPECIFIED CONVULSIONS: ICD-10-CM

## 2020-12-27 DIAGNOSIS — R53.1 WEAKNESS: ICD-10-CM

## 2020-12-27 DIAGNOSIS — N31.9 NEUROMUSCULAR DYSFUNCTION OF BLADDER, UNSPECIFIED: ICD-10-CM

## 2020-12-27 DIAGNOSIS — F32.9 MAJOR DEPRESSIVE DISORDER, SINGLE EPISODE, UNSPECIFIED: ICD-10-CM

## 2020-12-27 DIAGNOSIS — I82.90 ACUTE EMBOLISM AND THROMBOSIS OF UNSPECIFIED VEIN: ICD-10-CM

## 2020-12-27 DIAGNOSIS — Z98.890 OTHER SPECIFIED POSTPROCEDURAL STATES: Chronic | ICD-10-CM

## 2020-12-27 DIAGNOSIS — A41.9 SEPSIS, UNSPECIFIED ORGANISM: ICD-10-CM

## 2020-12-27 DIAGNOSIS — I61.9 NONTRAUMATIC INTRACEREBRAL HEMORRHAGE, UNSPECIFIED: ICD-10-CM

## 2020-12-27 DIAGNOSIS — G35 MULTIPLE SCLEROSIS: ICD-10-CM

## 2020-12-27 LAB
ALBUMIN SERPL ELPH-MCNC: 3.1 G/DL — LOW (ref 3.3–5)
ALP SERPL-CCNC: 76 U/L — SIGNIFICANT CHANGE UP (ref 40–120)
ALT FLD-CCNC: 22 U/L — SIGNIFICANT CHANGE UP (ref 10–45)
ANION GAP SERPL CALC-SCNC: 14 MMOL/L — SIGNIFICANT CHANGE UP (ref 5–17)
APPEARANCE UR: ABNORMAL
APTT BLD: 37.3 SEC — HIGH (ref 27.5–35.5)
AST SERPL-CCNC: 44 U/L — HIGH (ref 10–40)
BASOPHILS # BLD AUTO: 0 K/UL — SIGNIFICANT CHANGE UP (ref 0–0.2)
BASOPHILS NFR BLD AUTO: 0 % — SIGNIFICANT CHANGE UP (ref 0–2)
BILIRUB SERPL-MCNC: 0.2 MG/DL — SIGNIFICANT CHANGE UP (ref 0.2–1.2)
BILIRUB UR-MCNC: NEGATIVE — SIGNIFICANT CHANGE UP
BUN SERPL-MCNC: 15 MG/DL — SIGNIFICANT CHANGE UP (ref 7–23)
CALCIUM SERPL-MCNC: 8.4 MG/DL — SIGNIFICANT CHANGE UP (ref 8.4–10.5)
CHLORIDE SERPL-SCNC: 101 MMOL/L — SIGNIFICANT CHANGE UP (ref 96–108)
CK SERPL-CCNC: 78 U/L — SIGNIFICANT CHANGE UP (ref 25–170)
CO2 SERPL-SCNC: 26 MMOL/L — SIGNIFICANT CHANGE UP (ref 22–31)
COLOR SPEC: YELLOW — SIGNIFICANT CHANGE UP
CREAT SERPL-MCNC: 0.53 MG/DL — SIGNIFICANT CHANGE UP (ref 0.5–1.3)
CRP SERPL-MCNC: 10.54 MG/DL — HIGH (ref 0–0.4)
D DIMER BLD IA.RAPID-MCNC: 191 NG/ML DDU — SIGNIFICANT CHANGE UP
DIFF PNL FLD: ABNORMAL
EOSINOPHIL # BLD AUTO: 0 K/UL — SIGNIFICANT CHANGE UP (ref 0–0.5)
EOSINOPHIL NFR BLD AUTO: 0 % — SIGNIFICANT CHANGE UP (ref 0–6)
FERRITIN SERPL-MCNC: 366 NG/ML — HIGH (ref 15–150)
FIBRINOGEN PPP-MCNC: 327 MG/DL — SIGNIFICANT CHANGE UP (ref 258–438)
GAS PNL BLDV: SIGNIFICANT CHANGE UP
GLUCOSE BLDC GLUCOMTR-MCNC: 123 MG/DL — HIGH (ref 70–99)
GLUCOSE SERPL-MCNC: 95 MG/DL — SIGNIFICANT CHANGE UP (ref 70–99)
GLUCOSE UR QL: NEGATIVE — SIGNIFICANT CHANGE UP
HCT VFR BLD CALC: 41.3 % — SIGNIFICANT CHANGE UP (ref 34.5–45)
HGB BLD-MCNC: 13.6 G/DL — SIGNIFICANT CHANGE UP (ref 11.5–15.5)
INR BLD: 1.26 — HIGH (ref 0.88–1.16)
KETONES UR-MCNC: 40 MG/DL
LACTATE SERPL-SCNC: 0.8 MMOL/L — SIGNIFICANT CHANGE UP (ref 0.5–2)
LDH SERPL L TO P-CCNC: 307 U/L — HIGH (ref 50–242)
LEUKOCYTE ESTERASE UR-ACNC: ABNORMAL
LYMPHOCYTES # BLD AUTO: 0.33 K/UL — LOW (ref 1–3.3)
LYMPHOCYTES # BLD AUTO: 6.5 % — LOW (ref 13–44)
MCHC RBC-ENTMCNC: 30.8 PG — SIGNIFICANT CHANGE UP (ref 27–34)
MCHC RBC-ENTMCNC: 32.9 GM/DL — SIGNIFICANT CHANGE UP (ref 32–36)
MCV RBC AUTO: 93.7 FL — SIGNIFICANT CHANGE UP (ref 80–100)
MONOCYTES # BLD AUTO: 0.19 K/UL — SIGNIFICANT CHANGE UP (ref 0–0.9)
MONOCYTES NFR BLD AUTO: 3.7 % — SIGNIFICANT CHANGE UP (ref 2–14)
NEUTROPHILS # BLD AUTO: 4.08 K/UL — SIGNIFICANT CHANGE UP (ref 1.8–7.4)
NEUTROPHILS NFR BLD AUTO: 81.5 % — HIGH (ref 43–77)
NITRITE UR-MCNC: POSITIVE
NT-PROBNP SERPL-SCNC: 77 PG/ML — SIGNIFICANT CHANGE UP (ref 0–300)
PCP SPEC-MCNC: SIGNIFICANT CHANGE UP
PH UR: 6.5 — SIGNIFICANT CHANGE UP (ref 5–8)
PLATELET # BLD AUTO: 245 K/UL — SIGNIFICANT CHANGE UP (ref 150–400)
POTASSIUM SERPL-MCNC: 3.9 MMOL/L — SIGNIFICANT CHANGE UP (ref 3.5–5.3)
POTASSIUM SERPL-SCNC: 3.9 MMOL/L — SIGNIFICANT CHANGE UP (ref 3.5–5.3)
PROCALCITONIN SERPL-MCNC: 1.07 NG/ML — HIGH (ref 0.02–0.1)
PROT SERPL-MCNC: 5.9 G/DL — LOW (ref 6–8.3)
PROT UR-MCNC: 100 MG/DL
PROTHROM AB SERPL-ACNC: 15 SEC — HIGH (ref 10.6–13.6)
RBC # BLD: 4.41 M/UL — SIGNIFICANT CHANGE UP (ref 3.8–5.2)
RBC # FLD: 13.6 % — SIGNIFICANT CHANGE UP (ref 10.3–14.5)
SARS-COV-2 RNA SPEC QL NAA+PROBE: DETECTED
SODIUM SERPL-SCNC: 141 MMOL/L — SIGNIFICANT CHANGE UP (ref 135–145)
SP GR SPEC: >=1.03 — SIGNIFICANT CHANGE UP (ref 1–1.03)
TROPONIN T SERPL-MCNC: 0.02 NG/ML — HIGH (ref 0–0.01)
UROBILINOGEN FLD QL: 0.2 E.U./DL — SIGNIFICANT CHANGE UP
WBC # BLD: 5 K/UL — SIGNIFICANT CHANGE UP (ref 3.8–10.5)
WBC # FLD AUTO: 5 K/UL — SIGNIFICANT CHANGE UP (ref 3.8–10.5)

## 2020-12-27 PROCEDURE — 71045 X-RAY EXAM CHEST 1 VIEW: CPT | Mod: 26

## 2020-12-27 PROCEDURE — 99285 EMERGENCY DEPT VISIT HI MDM: CPT | Mod: CS

## 2020-12-27 PROCEDURE — 99223 1ST HOSP IP/OBS HIGH 75: CPT | Mod: CS,GC

## 2020-12-27 PROCEDURE — 93010 ELECTROCARDIOGRAM REPORT: CPT

## 2020-12-27 RX ORDER — REMDESIVIR 5 MG/ML
100 INJECTION INTRAVENOUS EVERY 24 HOURS
Refills: 0 | Status: DISCONTINUED | OUTPATIENT
Start: 2020-12-28 | End: 2020-12-30

## 2020-12-27 RX ORDER — BACLOFEN 100 %
20 POWDER (GRAM) MISCELLANEOUS THREE TIMES A DAY
Refills: 0 | Status: DISCONTINUED | OUTPATIENT
Start: 2020-12-27 | End: 2020-12-28

## 2020-12-27 RX ORDER — REMDESIVIR 5 MG/ML
INJECTION INTRAVENOUS
Refills: 0 | Status: DISCONTINUED | OUTPATIENT
Start: 2020-12-27 | End: 2020-12-30

## 2020-12-27 RX ORDER — CEFTRIAXONE 500 MG/1
1000 INJECTION, POWDER, FOR SOLUTION INTRAMUSCULAR; INTRAVENOUS ONCE
Refills: 0 | Status: COMPLETED | OUTPATIENT
Start: 2020-12-27 | End: 2020-12-27

## 2020-12-27 RX ORDER — OXYBUTYNIN CHLORIDE 5 MG
10 TABLET ORAL AT BEDTIME
Refills: 0 | Status: DISCONTINUED | OUTPATIENT
Start: 2020-12-27 | End: 2020-12-27

## 2020-12-27 RX ORDER — APIXABAN 2.5 MG/1
5 TABLET, FILM COATED ORAL EVERY 12 HOURS
Refills: 0 | Status: DISCONTINUED | OUTPATIENT
Start: 2020-12-27 | End: 2020-12-30

## 2020-12-27 RX ORDER — CEFTRIAXONE 500 MG/1
1000 INJECTION, POWDER, FOR SOLUTION INTRAMUSCULAR; INTRAVENOUS EVERY 24 HOURS
Refills: 0 | Status: DISCONTINUED | OUTPATIENT
Start: 2020-12-28 | End: 2020-12-28

## 2020-12-27 RX ORDER — ACETAMINOPHEN 500 MG
1000 TABLET ORAL ONCE
Refills: 0 | Status: COMPLETED | OUTPATIENT
Start: 2020-12-27 | End: 2020-12-27

## 2020-12-27 RX ORDER — LEVETIRACETAM 250 MG/1
250 TABLET, FILM COATED ORAL
Refills: 0 | Status: DISCONTINUED | OUTPATIENT
Start: 2020-12-27 | End: 2020-12-30

## 2020-12-27 RX ORDER — REMDESIVIR 5 MG/ML
200 INJECTION INTRAVENOUS EVERY 24 HOURS
Refills: 0 | Status: COMPLETED | OUTPATIENT
Start: 2020-12-27 | End: 2020-12-27

## 2020-12-27 RX ORDER — REMDESIVIR 5 MG/ML
INJECTION INTRAVENOUS
Refills: 0 | Status: DISCONTINUED | OUTPATIENT
Start: 2020-12-27 | End: 2020-12-27

## 2020-12-27 RX ORDER — SODIUM CHLORIDE 9 MG/ML
1000 INJECTION, SOLUTION INTRAVENOUS
Refills: 0 | Status: DISCONTINUED | OUTPATIENT
Start: 2020-12-27 | End: 2020-12-30

## 2020-12-27 RX ORDER — BUPROPION HYDROCHLORIDE 150 MG/1
150 TABLET, EXTENDED RELEASE ORAL DAILY
Refills: 0 | Status: DISCONTINUED | OUTPATIENT
Start: 2020-12-27 | End: 2020-12-27

## 2020-12-27 RX ORDER — LEVETIRACETAM 250 MG/1
500 TABLET, FILM COATED ORAL
Refills: 0 | Status: DISCONTINUED | OUTPATIENT
Start: 2020-12-27 | End: 2020-12-27

## 2020-12-27 RX ORDER — DEXTROSE 50 % IN WATER 50 %
25 SYRINGE (ML) INTRAVENOUS ONCE
Refills: 0 | Status: DISCONTINUED | OUTPATIENT
Start: 2020-12-27 | End: 2020-12-30

## 2020-12-27 RX ORDER — GLUCAGON INJECTION, SOLUTION 0.5 MG/.1ML
1 INJECTION, SOLUTION SUBCUTANEOUS ONCE
Refills: 0 | Status: DISCONTINUED | OUTPATIENT
Start: 2020-12-27 | End: 2020-12-30

## 2020-12-27 RX ORDER — ONDANSETRON 8 MG/1
4 TABLET, FILM COATED ORAL ONCE
Refills: 0 | Status: COMPLETED | OUTPATIENT
Start: 2020-12-27 | End: 2020-12-27

## 2020-12-27 RX ORDER — DEXAMETHASONE 0.5 MG/5ML
6 ELIXIR ORAL DAILY
Refills: 0 | Status: DISCONTINUED | OUTPATIENT
Start: 2020-12-27 | End: 2020-12-30

## 2020-12-27 RX ORDER — ESCITALOPRAM OXALATE 10 MG/1
20 TABLET, FILM COATED ORAL DAILY
Refills: 0 | Status: DISCONTINUED | OUTPATIENT
Start: 2020-12-27 | End: 2020-12-28

## 2020-12-27 RX ORDER — CLONAZEPAM 1 MG
1 TABLET ORAL AT BEDTIME
Refills: 0 | Status: DISCONTINUED | OUTPATIENT
Start: 2020-12-27 | End: 2020-12-28

## 2020-12-27 RX ORDER — TAMSULOSIN HYDROCHLORIDE 0.4 MG/1
0.8 CAPSULE ORAL AT BEDTIME
Refills: 0 | Status: DISCONTINUED | OUTPATIENT
Start: 2020-12-27 | End: 2020-12-30

## 2020-12-27 RX ORDER — MAGNESIUM OXIDE 400 MG ORAL TABLET 241.3 MG
400 TABLET ORAL
Refills: 0 | Status: DISCONTINUED | OUTPATIENT
Start: 2020-12-27 | End: 2020-12-27

## 2020-12-27 RX ORDER — DEXTROSE 50 % IN WATER 50 %
15 SYRINGE (ML) INTRAVENOUS ONCE
Refills: 0 | Status: DISCONTINUED | OUTPATIENT
Start: 2020-12-27 | End: 2020-12-30

## 2020-12-27 RX ORDER — FAMOTIDINE 10 MG/ML
20 INJECTION INTRAVENOUS DAILY
Refills: 0 | Status: DISCONTINUED | OUTPATIENT
Start: 2020-12-27 | End: 2020-12-30

## 2020-12-27 RX ORDER — SODIUM CHLORIDE 9 MG/ML
1000 INJECTION INTRAMUSCULAR; INTRAVENOUS; SUBCUTANEOUS ONCE
Refills: 0 | Status: COMPLETED | OUTPATIENT
Start: 2020-12-27 | End: 2020-12-27

## 2020-12-27 RX ORDER — INSULIN LISPRO 100/ML
VIAL (ML) SUBCUTANEOUS
Refills: 0 | Status: DISCONTINUED | OUTPATIENT
Start: 2020-12-27 | End: 2020-12-30

## 2020-12-27 RX ORDER — OXYBUTYNIN CHLORIDE 5 MG
5 TABLET ORAL
Refills: 0 | Status: DISCONTINUED | OUTPATIENT
Start: 2020-12-27 | End: 2020-12-30

## 2020-12-27 RX ADMIN — CEFTRIAXONE 100 MILLIGRAM(S): 500 INJECTION, POWDER, FOR SOLUTION INTRAMUSCULAR; INTRAVENOUS at 13:01

## 2020-12-27 RX ADMIN — Medication 6 MILLIGRAM(S): at 19:11

## 2020-12-27 RX ADMIN — Medication 5 MILLIGRAM(S): at 18:04

## 2020-12-27 RX ADMIN — TAMSULOSIN HYDROCHLORIDE 0.8 MILLIGRAM(S): 0.4 CAPSULE ORAL at 22:36

## 2020-12-27 RX ADMIN — FAMOTIDINE 20 MILLIGRAM(S): 10 INJECTION INTRAVENOUS at 22:35

## 2020-12-27 RX ADMIN — ONDANSETRON 4 MILLIGRAM(S): 8 TABLET, FILM COATED ORAL at 13:00

## 2020-12-27 RX ADMIN — SODIUM CHLORIDE 1000 MILLILITER(S): 9 INJECTION INTRAMUSCULAR; INTRAVENOUS; SUBCUTANEOUS at 13:26

## 2020-12-27 RX ADMIN — APIXABAN 5 MILLIGRAM(S): 2.5 TABLET, FILM COATED ORAL at 18:06

## 2020-12-27 RX ADMIN — Medication 1 MILLIGRAM(S): at 22:36

## 2020-12-27 RX ADMIN — Medication 1000 MILLIGRAM(S): at 14:20

## 2020-12-27 RX ADMIN — REMDESIVIR 500 MILLIGRAM(S): 5 INJECTION INTRAVENOUS at 19:11

## 2020-12-27 RX ADMIN — SODIUM CHLORIDE 1000 MILLILITER(S): 9 INJECTION INTRAMUSCULAR; INTRAVENOUS; SUBCUTANEOUS at 12:36

## 2020-12-27 RX ADMIN — Medication 150 MILLIGRAM(S): at 17:14

## 2020-12-27 RX ADMIN — LEVETIRACETAM 250 MILLIGRAM(S): 250 TABLET, FILM COATED ORAL at 17:14

## 2020-12-27 RX ADMIN — Medication 1000 MILLIGRAM(S): at 12:35

## 2020-12-27 RX ADMIN — ESCITALOPRAM OXALATE 20 MILLIGRAM(S): 10 TABLET, FILM COATED ORAL at 22:35

## 2020-12-27 RX ADMIN — CEFTRIAXONE 1000 MILLIGRAM(S): 500 INJECTION, POWDER, FOR SOLUTION INTRAMUSCULAR; INTRAVENOUS at 13:31

## 2020-12-27 NOTE — H&P ADULT - PROBLEM SELECTOR PLAN 1
Meeting SIRS criteria on admission with fever, tachycardia, tachypnea. UA grossly positive. Pt also with previous covid positive. No leukocytosis. Lactate 0.8. Now with XXX sxs. Received 1L in ED. Meeting SIRS criteria on admission with fever, tachycardia, tachypnea. UA grossly positive. Pt also with previous covid positive. No leukocytosis. Lactate 0.8. Received 1L in ED.  - f/u blood culture  - f/u urine culture  - trend fever curve  - tylenol prn for fever  - ceftriaxone 1g QD for UTI Meeting SIRS criteria on admission with fever, tachycardia, tachypnea. UA grossly positive. Pt also with previous covid positive. No leukocytosis. Lactate 0.8. Procalcitonin 1.07. Received 1L in ED.  - f/u blood culture  - f/u urine culture  - trend fever curve  - tylenol prn for fever  - ceftriaxone 1g QD for UTI

## 2020-12-27 NOTE — H&P ADULT - PROBLEM SELECTOR PROBLEM 5
Nontraumatic intracerebral hemorrhage, unspecified cerebral location, unspecified laterality Seizure

## 2020-12-27 NOTE — H&P ADULT - PROBLEM SELECTOR PLAN 10
F: Caution with possible covid  E: Replete PRN, goal K>4, Mg>2  N: Regular, vegetarian  DVT PPx: Lovenox  FULL CODE  Dispo: [facility] F: Caution with possible covid  E: Replete PRN, goal K>4, Mg>2  N: Regular, vegetarian  DVT PPx: Eliquis  FULL CODE  Dispo: [facility]

## 2020-12-27 NOTE — H&P ADULT - PROBLEM SELECTOR PLAN 3
Pt with waxing and waning mental status at baseline. Per brother, had been more confused and lethargic than normal. Likely 2/2 sepsis.   - management as above in problem #1 Pt with waxing and waning mental status at baseline. Per brother, had been more confused and lethargic than normal. Likely 2/2 urosepsis.   - management as above in problem #1

## 2020-12-27 NOTE — H&P ADULT - NSICDXPASTMEDICALHX_GEN_ALL_CORE_FT
PAST MEDICAL HISTORY:  COVID-19     ICH (intracerebral hemorrhage)     MS (multiple sclerosis)      no

## 2020-12-27 NOTE — ED PROVIDER NOTE - OBJECTIVE STATEMENT
MS, previous ICH, seizure, toxic metabolic encephalopathy, here with reported fever, nausea, change in mental status. Pt denies pain, sob. Per ems, was hypoxic to 80's on arrival on room air, covid+. Given oxygen with improvement. MS, previous ICH, seizure, toxic metabolic encephalopathy, here with reported fever, nausea, change in mental status. Pt denies pain, sob. Per ems, was hypoxic to 80's on arrival on room air, covid+. Given oxygen with improvement.  Pt's step brother Fransico Mckay contacted [phone], HCP, case discussed. States she has been having fever on and off for 1.5 weeks. Had covid test 12/19, told it was positive 12/21. Seemed to be doing ok with intermittent fever but yesterday a little more lethargic (not unusual for her) and today had nausea so sent to ED.

## 2020-12-27 NOTE — H&P ADULT - ASSESSMENT
Ms. Chari Reilly is a 55F with a PMHx of multiple sclerosis (bedbound at baseline), intracranial hemorrhage, HTN, seizure disorder who presents with fever and AMS. Ms. Chari Reilly is a 55F with a PMHx of multiple sclerosis (bedbound at baseline), intracranial hemorrhage, HTN, seizure disorder who presents with fever and AMS, likely 2/2 urosepsis.

## 2020-12-27 NOTE — ED ADULT TRIAGE NOTE - CHIEF COMPLAINT QUOTE
Pt BIBA CO worsening Weakness/ confusion/ AMS.  Per EMS, pt is altered at baseline, but baseline was not established by EMS.  Pts SW on their way to ED.  TMAX of 101.9 in field, afebrile in ED.  Hx of MS and Bedbound.  Pt on 10L O2 via NRB, baseline is RA.

## 2020-12-27 NOTE — H&P ADULT - PROBLEM SELECTOR PROBLEM 6
Seizure Nontraumatic intracerebral hemorrhage, unspecified cerebral location, unspecified laterality

## 2020-12-27 NOTE — H&P ADULT - PROBLEM SELECTOR PLAN 2
Pt with previous positive covid test. Reportedly hypoxic to 80s in the field. Currently denying cough or SOB. Pt with previous positive covid test. Reportedly hypoxic to 80s in the field. Currently denying cough or SOB. Pt SpO2 91-95% off O2 while off oxygen. Put on 2L NC.   - will hold off steroids in setting of acute infection  - consider initiation of remdesivir if oxygen requirements increase Pt with previous positive covid test. Reportedly hypoxic to 80s in the field. Currently denying cough or SOB. Pt SpO2 91-95% off O2 while off oxygen. Put on 2L NC. D-dimer wnl , CRP elevated  - will hold off steroids in setting of acute infection  - consider initiation of remdesivir if oxygen requirements increase Pt with previous positive covid test. Reportedly hypoxic to 80s in the field. Currently denying cough or SOB. Pt SpO2 91-95% off O2 while off oxygen. Put on 2L NC. D-dimer wnl , CRP elevated  - given true hypoxia at bedside, though unclear whether driven by covid or an independent entity, will start remdesivir (12/27 - 12/31) and decadron (12/27 - 1/6)  - supplemental O2 as needed to maintain SpO2 >92%  - mISS while on decadron

## 2020-12-27 NOTE — ED ADULT NURSE NOTE - OBJECTIVE STATEMENT
Patient sent from nursing facility for low o2 sat. O2 sat on room air 92%, patient placed on 2 liters. Pt has history of MS and is bedbound but is verbal and able to answer questions and make needs known. Medicated patient for fever. NO acute distress noted. Pt straight cathed for urine and specimine cloudy. MD Thomas aware and at bedside.

## 2020-12-27 NOTE — H&P ADULT - PROBLEM SELECTOR PLAN 6
VEEG on previous admission w/ seizure activity  - c/w Keppra 500mg BID  - monitor neuro exam - c/w keppra 250mg BID

## 2020-12-27 NOTE — H&P ADULT - ATTENDING COMMENTS
patient doing well -   endorses no complaints   treating for UA   will also discuss with icu team regarding remdesevir as patient did test +covid19 and hypoxic on RA  - will not start decadron in setting of sepsis 2/2 likely uti patient doing well -   endorses no complaints   treating for UA   will also discuss with icu team regarding remdesevir and decadron as patient did test +covid19 and hypoxic on RA

## 2020-12-27 NOTE — ED PROVIDER NOTE - CLINICAL SUMMARY MEDICAL DECISION MAKING FREE TEXT BOX
patient covid + 12/19 here with fever and nausea. denies sob but noted to be hypoxic on room air to 92%.  improved with supplemental oxygen 2L to 99%.  confirmatory covid testing sent along with labs to eval acs, chf, pneumonia, inflammatory markers, sepsis.  cxr done without infiltrate.  tylenol for fever.  labs notable for uti. suspect fever/nausea more related to uti than covid, will treat with ceftriaxone.  due to advanced ms, high risk of decompensation, prior toxic metabolic encephalopathy and oxygen requirement, will admit for further management

## 2020-12-27 NOTE — H&P ADULT - PROBLEM SELECTOR PROBLEM 9
Nutrition, metabolism, and development symptoms Deep vein thrombosis (DVT) of non-extremity vein, unspecified chronicity

## 2020-12-27 NOTE — H&P ADULT - PROBLEM SELECTOR PLAN 8
- c/w wellbutrin Unclear home wellbutrin dose  - med rec in AM Unclear home wellbutrin dose. Given seizure history, wellbutrin may not be best agent  - med rec in AM Unclear home wellbutrin dose. Given seizure history, wellbutrin may not be best agent.    - med rec in AM    #?Anxiety  Also on clonazepam 1.5mg qhs - unclear whether for anxiety or some other indication  - restarted 1mg qhs given +Utox and risk for withdrawal  - med rec in AM

## 2020-12-27 NOTE — H&P ADULT - PROBLEM SELECTOR PLAN 9
Per notes from previous admission, had had a DVT of unknown extremity of unknown duration. Had been on eliquis 2.5mg BID  - obtain collateral, will hold eliquis at this time Per notes from previous admission, had had a DVT of unknown extremity of unknown duration. Had been on eliquis 2.5mg BID per previous notes  - will start treatment with eliquis 5mg BID   - obtain collateral in AM Per notes from previous admission, had had a DVT of unknown extremity of unknown duration. Had been on eliquis 2.5mg BID per previous notes  - will start treatment with eliquis 5mg BID   - obtain collateral in AM regarding date/details of DVT

## 2020-12-27 NOTE — ED PROVIDER NOTE - CONSTITUTIONAL, MLM
normal... tired appearing, awake, alert, oriented to person, place, unsure of time and in no apparent distress.

## 2020-12-27 NOTE — H&P ADULT - NSHPPHYSICALEXAM_GEN_ALL_CORE
VITAL SIGNS:  T(C): 36.6 (12-27-20 @ 14:21), Max: 38.7 (12-27-20 @ 12:11)  T(F): 97.8 (12-27-20 @ 14:21), Max: 101.7 (12-27-20 @ 12:11)  HR: 88 (12-27-20 @ 14:21) (88 - 103)  BP: 105/58 (12-27-20 @ 14:21) (102/65 - 105/58)  BP(mean): --  RR: 18 (12-27-20 @ 14:21) (18 - 22)  SpO2: 100% (12-27-20 @ 14:21) (100% - 100%)  Wt(kg): --    PHYSICAL EXAM:    Constitutional: WDWN resting comfortably in bed; NAD  Head: NC/AT  Eyes: PERRL, EOMI, anicteric sclera  ENT: no nasal discharge; uvula midline, no oropharyngeal erythema or exudates; MMM  Neck: supple; no JVD or thyromegaly  Respiratory: CTA B/L; no W/R/R, no retractions  Cardiac: +S1/S2; RRR; no M/R/G; PMI non-displaced  Gastrointestinal: abdomen soft, NT/ND; no rebound or guarding; +BSx4  Genitourinary: normal external genitalia  Back: spine midline, no bony tenderness or step-offs; no CVAT B/L  Extremities: WWP, no clubbing or cyanosis; no peripheral edema  Musculoskeletal: NROM x4; no joint swelling, tenderness or erythema  Vascular: 2+ radial, femoral, DP/PT pulses B/L  Dermatologic: skin warm, dry and intact; no rashes, wounds, or scars  Lymphatic: no submandibular or cervical LAD  Neurologic: AAOx3; CNII-XII grossly intact; no focal deficits  Psychiatric: affect and characteristics of appearance, verbalizations, behaviors are appropriate VITAL SIGNS:  T(C): 36.6 (12-27-20 @ 14:21), Max: 38.7 (12-27-20 @ 12:11)  T(F): 97.8 (12-27-20 @ 14:21), Max: 101.7 (12-27-20 @ 12:11)  HR: 88 (12-27-20 @ 14:21) (88 - 103)  BP: 105/58 (12-27-20 @ 14:21) (102/65 - 105/58)  BP(mean): --  RR: 18 (12-27-20 @ 14:21) (18 - 22)  SpO2: 100% (12-27-20 @ 14:21) (100% - 100%)  Wt(kg): --    PHYSICAL EXAM:    Constitutional: Adult female, WDWN resting comfortably in bed; NAD  Head: NC/AT  Eyes: PERRL, EOMI, anicteric sclera  ENT: no nasal discharge; uvula midline, no oropharyngeal erythema or exudates; slightly dry MM, nasal cannula in place  Neck: supple; no JVD or thyromegaly  Respiratory: Poor air entry b/l, no appreciable w/r/r  Cardiac: +S1/S2; RRR; no M/R/G; PMI non-displaced  Gastrointestinal: abdomen soft, NT/ND; no rebound or guarding; +BSx4  Extremities: WWP, no clubbing or cyanosis; slight non-pitting edema b/l LEs  Musculoskeletal: NROM x2 UEs, strength 3/5 b/l LEs  Vascular: 2+ radial, femoral, DP/PT pulses B/L  Dermatologic: skin warm, dry and intact; no rashes, wounds, or scars  Lymphatic: no submandibular or cervical LAD  Neurologic: AAOx3; CNII-XII grossly intact; no focal deficits  Psychiatric: affect and characteristics of appearance, verbalizations, behaviors are appropriate

## 2020-12-27 NOTE — H&P ADULT - NSHPLABSRESULTS_GEN_ALL_CORE
LABS:                         13.6   5.00  )-----------( 245      ( 27 Dec 2020 12:27 )             41.3     12-27    141  |  101  |  15  ----------------------------<  95  3.9   |  26  |  0.53    Ca    8.4      27 Dec 2020 12:27    TPro  5.9<L>  /  Alb  3.1<L>  /  TBili  0.2  /  DBili  x   /  AST  44<H>  /  ALT  22  /  AlkPhos  76  12-27    PT/INR - ( 27 Dec 2020 12:27 )   PT: 15.0 sec;   INR: 1.26          PTT - ( 27 Dec 2020 12:27 )  PTT:37.3 sec  Urinalysis Basic - ( 27 Dec 2020 12:34 )    Color: Yellow / Appearance: Turbid / SG: >=1.030 / pH: x  Gluc: x / Ketone: 40 mg/dL  / Bili: Negative / Urobili: 0.2 E.U./dL   Blood: x / Protein: 100 mg/dL / Nitrite: POSITIVE   Leuk Esterase: Moderate / RBC: 5-10 /HPF / WBC Loaded /HPF   Sq Epi: x / Non Sq Epi: 0-5 /HPF / Bacteria: Many /HPF      CARDIAC MARKERS ( 27 Dec 2020 12:27 )  x     / 0.02 ng/mL / 78 U/L / x     / x          Serum Pro-Brain Natriuretic Peptide: 77 pg/mL (12-27 @ 12:27)    Lactate, Blood: 0.8 mmol/L (12-27 @ 12:28)      RADIOLOGY, EKG & ADDITIONAL TESTS: Reviewed.

## 2020-12-27 NOTE — H&P ADULT - HISTORY OF PRESENT ILLNESS
Ms. Chari Reilly is a 55F with a PMHx of multiple sclerosis (bedbound at baseline), intracranial hemorrhage, HTN, seizure disorder who presents with fever and nausea. Per ED/EMS, pt was febrile to 101.9 in the field, hypoxic to 80s on room air, placed on 10L NRB. Pt states she has been having intermittent fevers for the past two weeks. Tested positive for Covid-19 on 12/21. Pt denies chills, chest pain, SOB, abdominal pain, vomiting, numbness, tingling.    On arrival to ED, VS: T 99.4, /65, , RR 22 SpO2 100% on 10L NRB    Labs significant for: CBC wnl, bmp wnl, albumin 3.1, AST 44, CRP 10.54, ferritin 366, procalcitonin 1.07, , troponin 0.02, VBG pH 7.48, pCO2 36, UA with loaded WBC, positive nitrite, moderate LE, 40 ketone. Utox positive for benzodiazepine.    In ED, pt received:  Ms. Chari Reilly is a 55F with a PMHx of multiple sclerosis (bedbound at baseline), intracranial hemorrhage, HTN, seizure disorder who presents with fever and nausea. Per ED/EMS, pt was febrile to 101.9 in the field, hypoxic to 80s on room air, placed on 10L NRB. Pt states she has been having intermittent fevers for the past two weeks. Tested positive for Covid-19 on 12/21. Pt denies chills, chest pain, SOB, abdominal pain, vomiting, numbness, tingling.    On arrival to ED, VS: T 99.4, /65, , RR 22 SpO2 100% on 10L NRB    Labs significant for: CBC wnl, bmp wnl, albumin 3.1, AST 44, CRP 10.54, ferritin 366, procalcitonin 1.07, , troponin 0.02, VBG pH 7.48, pCO2 36, UA with loaded WBC, positive nitrite, moderate LE, 40 ketone. Utox positive for benzodiazepine.    In ED, pt received: Ceftriaxone 1g, zofran 4mg, tylenol 1g, NS 1L Ms. Chari Reilly is a 55F with a PMHx of multiple sclerosis (bedbound at baseline), intracranial hemorrhage, HTN, seizure disorder who presents with fever and AMS. Per ED/EMS, pt was febrile to 101.9 in the field, hypoxic to 80s on room air, placed on 10L NRB. Pt states she has been having intermittent fevers for the past two weeks. Her brother in law states that in addition to her intermittent fevers, she has been increasingly lethargic and confused. Tested positive for Covid-19 on 12/21. Pt denies chills, chest pain, SOB, abdominal pain, vomiting, numbness, tingling.    On arrival to ED, VS: T 99.4, /65, , RR 22 SpO2 100% on 10L NRB    Labs significant for: CBC wnl, bmp wnl, albumin 3.1, AST 44, CRP 10.54, ferritin 366, procalcitonin 1.07, , troponin 0.02, VBG pH 7.48, pCO2 36, UA with loaded WBC, positive nitrite, moderate LE, 40 ketone. Utox positive for benzodiazepine.    In ED, pt received: Ceftriaxone 1g, zofran 4mg, tylenol 1g, NS 1L Ms. Chari Reilly is a 55F with a PMHx of multiple sclerosis (bedbound at baseline), intracranial hemorrhage, HTN, seizure disorder who presents with fever and AMS. Per ED/EMS, pt was febrile to 101.9 in the field, hypoxic to 80s on room air, placed on 10L NRB. Pt states she has been having intermittent fevers for the past two weeks. Her brother in law states that in addition to her intermittent fevers, she has been increasingly lethargic and confused. Tested positive for Covid-19 on 12/21. Pt denies chills, chest pain, SOB, abdominal pain, vomiting, numbness, tingling.    On arrival to ED, VS: T 99.4 -> 101.7, /65, , RR 22 SpO2 100% on 10L NRB    Labs significant for: CBC wnl, bmp wnl, albumin 3.1, AST 44, CRP 10.54, ferritin 366, procalcitonin 1.07, , troponin 0.02, VBG pH 7.48, pCO2 36, UA with loaded WBC, positive nitrite, moderate LE, 40 ketone. Utox positive for benzodiazepine.    In ED, pt received: Ceftriaxone 1g, zofran 4mg, tylenol 1g, NS 1L Ms. Chari Reilly is a 55F with a PMHx of multiple sclerosis (bedbound at baseline), intracranial hemorrhage, HTN, seizure disorder who was BIBEMS with fever and AMS. Per ED/EMS, pt was febrile to 101.9 in the field, hypoxic to 80s on room air, placed on 10L NRB. Pt states she has been having intermittent fevers for the past two weeks, but she otherwise denies any recent chest pain, SOB, abdominal pain, n/v/d/c, numbness, tingling, rash. Her brother in law states that in addition to her intermittent fevers, she has been increasingly lethargic and confused. Tested positive for Covid-19 on 12/21. She lives at home with a 24/7 health aide, and is bed bound at baseline due to MS.     On arrival to ED, VS: T 99.4 -> 101.7, /65, , RR 22 SpO2 100% on 10L NRB    Labs significant for: CBC wnl, bmp wnl, albumin 3.1, AST 44, CRP 10.54, ferritin 366, procalcitonin 1.07, , troponin 0.02, VBG pH 7.48, pCO2 36, UA with loaded WBC, positive nitrite, moderate LE, 40 ketone. Utox positive for benzodiazepine.    EKG sinus tachycardia with RBBB. CXR without clear infiltrate.    In ED, pt received: Ceftriaxone 1g, zofran 4mg, tylenol 1g, NS 1L

## 2020-12-27 NOTE — H&P ADULT - PROBLEM SELECTOR PLAN 5
VEEG on previous admission w/ seizure activity  - c/w Keppra 250mg BID (per most recent outpatient neuro notes)  - monitor neuro exam

## 2020-12-28 DIAGNOSIS — N39.0 URINARY TRACT INFECTION, SITE NOT SPECIFIED: ICD-10-CM

## 2020-12-28 DIAGNOSIS — G93.41 METABOLIC ENCEPHALOPATHY: ICD-10-CM

## 2020-12-28 LAB
A1C WITH ESTIMATED AVERAGE GLUCOSE RESULT: 5.4 % — SIGNIFICANT CHANGE UP (ref 4–5.6)
ALBUMIN SERPL ELPH-MCNC: 2.8 G/DL — LOW (ref 3.3–5)
ALP SERPL-CCNC: 80 U/L — SIGNIFICANT CHANGE UP (ref 40–120)
ALT FLD-CCNC: 21 U/L — SIGNIFICANT CHANGE UP (ref 10–45)
ANION GAP SERPL CALC-SCNC: 10 MMOL/L — SIGNIFICANT CHANGE UP (ref 5–17)
ANISOCYTOSIS BLD QL: SLIGHT — SIGNIFICANT CHANGE UP
AST SERPL-CCNC: 40 U/L — SIGNIFICANT CHANGE UP (ref 10–40)
BASOPHILS # BLD AUTO: 0 K/UL — SIGNIFICANT CHANGE UP (ref 0–0.2)
BASOPHILS NFR BLD AUTO: 0 % — SIGNIFICANT CHANGE UP (ref 0–2)
BILIRUB SERPL-MCNC: 0.2 MG/DL — SIGNIFICANT CHANGE UP (ref 0.2–1.2)
BUN SERPL-MCNC: 14 MG/DL — SIGNIFICANT CHANGE UP (ref 7–23)
BURR CELLS BLD QL SMEAR: PRESENT — SIGNIFICANT CHANGE UP
CALCIUM SERPL-MCNC: 8 MG/DL — LOW (ref 8.4–10.5)
CHLORIDE SERPL-SCNC: 105 MMOL/L — SIGNIFICANT CHANGE UP (ref 96–108)
CO2 SERPL-SCNC: 26 MMOL/L — SIGNIFICANT CHANGE UP (ref 22–31)
CREAT SERPL-MCNC: 0.4 MG/DL — LOW (ref 0.5–1.3)
D DIMER BLD IA.RAPID-MCNC: <150 NG/ML DDU — SIGNIFICANT CHANGE UP
ELLIPTOCYTES BLD QL SMEAR: SLIGHT — SIGNIFICANT CHANGE UP
EOSINOPHIL # BLD AUTO: 0 K/UL — SIGNIFICANT CHANGE UP (ref 0–0.5)
EOSINOPHIL NFR BLD AUTO: 0 % — SIGNIFICANT CHANGE UP (ref 0–6)
ESTIMATED AVERAGE GLUCOSE: 108 MG/DL — SIGNIFICANT CHANGE UP (ref 68–114)
FERRITIN SERPL-MCNC: 432 NG/ML — HIGH (ref 15–150)
GIANT PLATELETS BLD QL SMEAR: PRESENT — SIGNIFICANT CHANGE UP
GLUCOSE BLDC GLUCOMTR-MCNC: 113 MG/DL — HIGH (ref 70–99)
GLUCOSE BLDC GLUCOMTR-MCNC: 116 MG/DL — HIGH (ref 70–99)
GLUCOSE BLDC GLUCOMTR-MCNC: 125 MG/DL — HIGH (ref 70–99)
GLUCOSE SERPL-MCNC: 134 MG/DL — HIGH (ref 70–99)
HCT VFR BLD CALC: 43.8 % — SIGNIFICANT CHANGE UP (ref 34.5–45)
HGB BLD-MCNC: 13.8 G/DL — SIGNIFICANT CHANGE UP (ref 11.5–15.5)
LYMPHOCYTES # BLD AUTO: 0.38 K/UL — LOW (ref 1–3.3)
LYMPHOCYTES # BLD AUTO: 19.3 % — SIGNIFICANT CHANGE UP (ref 13–44)
MACROCYTES BLD QL: SLIGHT — SIGNIFICANT CHANGE UP
MAGNESIUM SERPL-MCNC: 2.2 MG/DL — SIGNIFICANT CHANGE UP (ref 1.6–2.6)
MANUAL SMEAR VERIFICATION: SIGNIFICANT CHANGE UP
MCHC RBC-ENTMCNC: 31 PG — SIGNIFICANT CHANGE UP (ref 27–34)
MCHC RBC-ENTMCNC: 31.5 GM/DL — LOW (ref 32–36)
MCV RBC AUTO: 98.4 FL — SIGNIFICANT CHANGE UP (ref 80–100)
METAMYELOCYTES # FLD: 6.8 % — HIGH (ref 0–0)
MONOCYTES # BLD AUTO: 0.02 K/UL — SIGNIFICANT CHANGE UP (ref 0–0.9)
MONOCYTES NFR BLD AUTO: 1.2 % — LOW (ref 2–14)
MYELOCYTES NFR BLD: 1.1 % — HIGH (ref 0–0)
NEUTROPHILS # BLD AUTO: 1.39 K/UL — LOW (ref 1.8–7.4)
NEUTROPHILS NFR BLD AUTO: 69.3 % — SIGNIFICANT CHANGE UP (ref 43–77)
NEUTS BAND # BLD: 1.2 % — SIGNIFICANT CHANGE UP (ref 0–8)
NRBC # BLD: 1 /100 — HIGH (ref 0–0)
NRBC # BLD: SIGNIFICANT CHANGE UP /100 WBCS (ref 0–0)
PHOSPHATE SERPL-MCNC: 2.7 MG/DL — SIGNIFICANT CHANGE UP (ref 2.5–4.5)
PLAT MORPH BLD: ABNORMAL
PLATELET # BLD AUTO: 259 K/UL — SIGNIFICANT CHANGE UP (ref 150–400)
POIKILOCYTOSIS BLD QL AUTO: SIGNIFICANT CHANGE UP
POTASSIUM SERPL-MCNC: 4.5 MMOL/L — SIGNIFICANT CHANGE UP (ref 3.5–5.3)
POTASSIUM SERPL-SCNC: 4.5 MMOL/L — SIGNIFICANT CHANGE UP (ref 3.5–5.3)
PROT SERPL-MCNC: 5.8 G/DL — LOW (ref 6–8.3)
RBC # BLD: 4.45 M/UL — SIGNIFICANT CHANGE UP (ref 3.8–5.2)
RBC # FLD: 13.6 % — SIGNIFICANT CHANGE UP (ref 10.3–14.5)
RBC BLD AUTO: ABNORMAL
SARS-COV-2 RNA SPEC QL NAA+PROBE: POSITIVE
SMUDGE CELLS # BLD: PRESENT — SIGNIFICANT CHANGE UP
SODIUM SERPL-SCNC: 141 MMOL/L — SIGNIFICANT CHANGE UP (ref 135–145)
VARIANT LYMPHS # BLD: 1.1 % — SIGNIFICANT CHANGE UP (ref 0–6)
WBC # BLD: 1.97 K/UL — LOW (ref 3.8–10.5)
WBC # FLD AUTO: 1.97 K/UL — LOW (ref 3.8–10.5)

## 2020-12-28 PROCEDURE — 99233 SBSQ HOSP IP/OBS HIGH 50: CPT | Mod: CS,GC

## 2020-12-28 RX ORDER — BUPROPION HYDROCHLORIDE 150 MG/1
200 TABLET, EXTENDED RELEASE ORAL DAILY
Refills: 0 | Status: DISCONTINUED | OUTPATIENT
Start: 2020-12-28 | End: 2020-12-30

## 2020-12-28 RX ORDER — TAMSULOSIN HYDROCHLORIDE 0.4 MG/1
2 CAPSULE ORAL
Qty: 0 | Refills: 0 | DISCHARGE

## 2020-12-28 RX ORDER — OXYBUTYNIN CHLORIDE 5 MG
1 TABLET ORAL
Qty: 0 | Refills: 0 | DISCHARGE

## 2020-12-28 RX ORDER — CLONAZEPAM 1 MG
3 TABLET ORAL
Qty: 0 | Refills: 0 | DISCHARGE

## 2020-12-28 RX ORDER — ESCITALOPRAM OXALATE 10 MG/1
2 TABLET, FILM COATED ORAL
Qty: 0 | Refills: 0 | DISCHARGE

## 2020-12-28 RX ORDER — AMPICILLIN TRIHYDRATE 250 MG
2 CAPSULE ORAL EVERY 6 HOURS
Refills: 0 | Status: DISCONTINUED | OUTPATIENT
Start: 2020-12-28 | End: 2020-12-30

## 2020-12-28 RX ORDER — SACCHAROMYCES BOULARDII 250 MG
1 POWDER IN PACKET (EA) ORAL
Qty: 0 | Refills: 0 | DISCHARGE

## 2020-12-28 RX ORDER — POLYETHYLENE GLYCOL 3350 17 G/17G
17 POWDER, FOR SOLUTION ORAL EVERY 24 HOURS
Refills: 0 | Status: DISCONTINUED | OUTPATIENT
Start: 2020-12-28 | End: 2020-12-30

## 2020-12-28 RX ORDER — ASCORBIC ACID 60 MG
1 TABLET,CHEWABLE ORAL
Qty: 0 | Refills: 0 | DISCHARGE

## 2020-12-28 RX ORDER — CLONAZEPAM 1 MG
1.5 TABLET ORAL AT BEDTIME
Refills: 0 | Status: DISCONTINUED | OUTPATIENT
Start: 2020-12-28 | End: 2020-12-30

## 2020-12-28 RX ORDER — SENNA PLUS 8.6 MG/1
2 TABLET ORAL AT BEDTIME
Refills: 0 | Status: DISCONTINUED | OUTPATIENT
Start: 2020-12-28 | End: 2020-12-30

## 2020-12-28 RX ORDER — ESCITALOPRAM OXALATE 10 MG/1
20 TABLET, FILM COATED ORAL EVERY 24 HOURS
Refills: 0 | Status: DISCONTINUED | OUTPATIENT
Start: 2020-12-29 | End: 2020-12-30

## 2020-12-28 RX ORDER — L.ACIDOPH/B.ANIMALIS/B.LONGUM 15B CELL
1 CAPSULE ORAL
Qty: 0 | Refills: 0 | DISCHARGE

## 2020-12-28 RX ORDER — METHENAMINE MANDELATE 1 G
1 TABLET ORAL
Qty: 0 | Refills: 0 | DISCHARGE

## 2020-12-28 RX ORDER — LEVETIRACETAM 250 MG/1
1 TABLET, FILM COATED ORAL
Qty: 0 | Refills: 0 | DISCHARGE

## 2020-12-28 RX ORDER — FAMOTIDINE 10 MG/ML
1 INJECTION INTRAVENOUS
Qty: 0 | Refills: 0 | DISCHARGE

## 2020-12-28 RX ORDER — VALACYCLOVIR 500 MG/1
1 TABLET, FILM COATED ORAL
Qty: 0 | Refills: 0 | DISCHARGE

## 2020-12-28 RX ORDER — BUPROPION HYDROCHLORIDE 150 MG/1
1 TABLET, EXTENDED RELEASE ORAL
Qty: 0 | Refills: 0 | DISCHARGE

## 2020-12-28 RX ORDER — TRAZODONE HCL 50 MG
1 TABLET ORAL
Qty: 0 | Refills: 0 | DISCHARGE

## 2020-12-28 RX ORDER — ONDANSETRON 8 MG/1
4 TABLET, FILM COATED ORAL EVERY 8 HOURS
Refills: 0 | Status: DISCONTINUED | OUTPATIENT
Start: 2020-12-28 | End: 2020-12-30

## 2020-12-28 RX ORDER — MAGNESIUM OXIDE 400 MG ORAL TABLET 241.3 MG
4 TABLET ORAL
Qty: 0 | Refills: 0 | DISCHARGE

## 2020-12-28 RX ORDER — VERAPAMIL HCL 240 MG
1 CAPSULE, EXTENDED RELEASE PELLETS 24 HR ORAL
Qty: 0 | Refills: 0 | DISCHARGE

## 2020-12-28 RX ORDER — BACLOFEN 100 %
1 POWDER (GRAM) MISCELLANEOUS
Qty: 0 | Refills: 0 | DISCHARGE

## 2020-12-28 RX ORDER — BUPROPION HYDROCHLORIDE 150 MG/1
200 TABLET, EXTENDED RELEASE ORAL DAILY
Refills: 0 | Status: DISCONTINUED | OUTPATIENT
Start: 2020-12-28 | End: 2020-12-28

## 2020-12-28 RX ADMIN — Medication 6 MILLIGRAM(S): at 12:23

## 2020-12-28 RX ADMIN — Medication 5 MILLIGRAM(S): at 06:44

## 2020-12-28 RX ADMIN — Medication 150 MILLIGRAM(S): at 18:21

## 2020-12-28 RX ADMIN — LEVETIRACETAM 250 MILLIGRAM(S): 250 TABLET, FILM COATED ORAL at 17:38

## 2020-12-28 RX ADMIN — ONDANSETRON 4 MILLIGRAM(S): 8 TABLET, FILM COATED ORAL at 16:03

## 2020-12-28 RX ADMIN — APIXABAN 5 MILLIGRAM(S): 2.5 TABLET, FILM COATED ORAL at 17:38

## 2020-12-28 RX ADMIN — FAMOTIDINE 20 MILLIGRAM(S): 10 INJECTION INTRAVENOUS at 12:25

## 2020-12-28 RX ADMIN — APIXABAN 5 MILLIGRAM(S): 2.5 TABLET, FILM COATED ORAL at 06:44

## 2020-12-28 RX ADMIN — SENNA PLUS 2 TABLET(S): 8.6 TABLET ORAL at 21:44

## 2020-12-28 RX ADMIN — Medication 1.5 MILLIGRAM(S): at 21:43

## 2020-12-28 RX ADMIN — TAMSULOSIN HYDROCHLORIDE 0.8 MILLIGRAM(S): 0.4 CAPSULE ORAL at 21:44

## 2020-12-28 RX ADMIN — Medication 150 MILLIGRAM(S): at 06:15

## 2020-12-28 RX ADMIN — BUPROPION HYDROCHLORIDE 200 MILLIGRAM(S): 150 TABLET, EXTENDED RELEASE ORAL at 19:29

## 2020-12-28 RX ADMIN — CEFTRIAXONE 100 MILLIGRAM(S): 500 INJECTION, POWDER, FOR SOLUTION INTRAMUSCULAR; INTRAVENOUS at 12:28

## 2020-12-28 RX ADMIN — REMDESIVIR 500 MILLIGRAM(S): 5 INJECTION INTRAVENOUS at 18:21

## 2020-12-28 RX ADMIN — Medication 5 MILLIGRAM(S): at 17:38

## 2020-12-28 RX ADMIN — LEVETIRACETAM 250 MILLIGRAM(S): 250 TABLET, FILM COATED ORAL at 06:45

## 2020-12-28 RX ADMIN — Medication 216 GRAM(S): at 21:43

## 2020-12-28 RX ADMIN — Medication 216 GRAM(S): at 15:43

## 2020-12-28 RX ADMIN — ESCITALOPRAM OXALATE 20 MILLIGRAM(S): 10 TABLET, FILM COATED ORAL at 12:26

## 2020-12-28 NOTE — PROGRESS NOTE ADULT - PROBLEM SELECTOR PLAN 5
VEEG on previous admission w/ seizure activity  - c/w Keppra 250mg BID (per most recent outpatient neuro notes)  - monitor neuro exam VEEG on previous admission w/ seizure activity  - c/w Keppra 250mg BID (per most recent outpatient neuro notes)  - Neuro exam baseline - LE weakness>UE weakness, bedbound at baseline.

## 2020-12-28 NOTE — PROGRESS NOTE ADULT - PROBLEM SELECTOR PLAN 3
Pt with waxing and waning mental status at baseline. Per brother, had been more confused and lethargic than normal. Likely 2/2 urosepsis.   - management as above in problem #1 RESOLVED - patient AAOx3 now.   Per brother, patient had AMS prior to admission, not her baseline. Likely 2/2 sepsis from UTI. Pt with previous positive covid test. Reportedly hypoxic to 80s in the field. Currently denying cough or SOB. Pt SpO2 91-95% off O2 while off oxygen. Put on 2L NC. D-dimer wnl , CRP elevated  - given true hypoxia at bedside, though unclear whether driven by covid or an independent entity, will start remdesivir (12/27 - 12/31) and decadron (12/27 - 1/6)  -Currently patient is saturating well on RA 94%. Patient cannot walk, but sitting O2 is 94% RA.   - supplemental O2 as needed to maintain SpO2 >92%  - mISS while on decadron Pt with previous positive COVID test. Reportedly hypoxic to 80s in the field. Currently denying cough or SOB. Pt SpO2 91-95% off O2 while off oxygen. Put on 2L NC. D-dimer wnl , CRP elevated  - given true hypoxia at bedside, though unclear whether driven by covid or an independent entity, will start remdesivir (12/27 - 12/31) and decadron (12/27 - 1/6)  -Currently patient is saturating well on RA 94%. Patient cannot walk, but sitting O2 is 94% RA.   - supplemental O2 as needed to maintain SpO2 >92%  - mISS while on decadron

## 2020-12-28 NOTE — PROGRESS NOTE ADULT - PROBLEM SELECTOR PROBLEM 6
Nontraumatic intracerebral hemorrhage, unspecified cerebral location, unspecified laterality Depression, unspecified depression type

## 2020-12-28 NOTE — CHART NOTE - NSCHARTNOTEFT_GEN_A_CORE
Confidential Drug Utilization Report  Search Terms: chari reilly, 1965Search Date: 12/28/2020 19:17:55 PM  Searching on behalf of: Myself  The Drug Utilization Report below displays all of the controlled substance prescriptions, if any, that your patient has filled in the last twelve months. The information displayed on this report is compiled from pharmacy submissions to the Department, and accurately reflects the information as submitted by the pharmacies.    This report was requested by: La Sue | Reference #: [id number]    Others' Prescriptions  Patient Name: Chari ReillyBirth Date: 1965  Address: [address] [id number]: Male  Rx Written	Rx Dispensed	Drug	Quantity	Days Supply	Prescriber Name	Payment Method	Dispenser  02/05/2020	02/05/2020	hydrocodone-acetaminophen 5-300 mg tablet	30	5	Basil Monge MD	[facility]    Patient Name: Chari ReillyBirth Date: 1965  Address: [address] [place] [id number]: Female  Rx Written	Rx Dispensed	Drug	Quantity	Days Supply	Prescriber Name	Payment Method	Dispenser  12/14/2020	12/21/2020	pregabalin 150 mg capsule	60	30	Wabasha, Anuel F NP	Insurance	[facility]  12/14/2020	12/21/2020	clonazepam 0.5 mg tablet	90	30	Padmini, Anuel F NP	Insurance	[facility]  06/15/2020	11/24/2020	pregabalin 150 mg capsule	60	30	Wabasha, Anuel F NP	Insurance	[facility]  11/18/2020	11/24/2020	clonazepam 0.5 mg tablet	90	30	Wabasha, Anuel F NP	Insurance	[facility]  06/15/2020	10/26/2020	pregabalin 150 mg capsule	60	30	Wabasha, Anuel F NP	Insurance	[facility]  10/21/2020	10/26/2020	clonazepam 0.5 mg tablet	90	30	Padmini, Anuel F NP	Insurance	[facility]  06/15/2020	09/08/2020	pregabalin 150 mg capsule	60	30	Wabasha, Anuel F NP	Insurance	[facility]  09/05/2020	09/08/2020	clonazepam 0.5 mg tablet	90	30	Wabasha, Anuel F NP	Insurance	[facility]  06/15/2020	08/10/2020	pregabalin 150 mg capsule	60	30	Wabasha, Anuel JAY NP	Insurance	[facility]  08/10/2020	08/10/2020	clonazepam 0.5 mg tablet	90	30	Wabasha, Anuel JAY NP	Insurance	[facility]  06/15/2020	07/13/2020	pregabalin 150 mg capsule	60	30	Wabasha, Anuel JAY NP	Insurance	[facility]  07/01/2020	07/13/2020	clonazepam 0.5 mg tablet	90	30	Padmini, Anuel JAY NP	Insurance	[facility]  01/21/2020	06/15/2020	pregabalin 150 mg capsule	60	30	Wabasha, Anuel JAY NP	Insurance	[facility]  06/08/2020	06/15/2020	clonazepam 0.5 mg tablet	90	30	Padmini, Anuel JAY NP	Insurance	[facility]  01/21/2020	05/20/2020	pregabalin 150 mg capsule	60	30	Wabasha, Anuel JAY NP	Insurance	[facility]  05/13/2020	05/20/2020	clonazepam 0.5 mg tablet	90	30	Padmini, Anuel JAY NP	Insurance	[facility]  04/19/2020	04/21/2020	clonazepam 0.5 mg tablet	90	30	Wabasha, Anuel JAY NP	Insurance	[facility]  01/21/2020	04/19/2020	pregabalin 150 mg capsule	60	30	Wabasha, Anuel JAY NP	Insurance	[facility]  01/21/2020	03/25/2020	pregabalin 150 mg capsule	60	30	Padmini, Anuel JAY NP	Insurance	[facility]  03/24/2020	03/25/2020	clonazepam 0.5 mg tablet	90	30	Wabasha, Anuel JAY NP	Insurance	[facility]  02/27/2020	02/28/2020	clonazepam 0.5 mg tablet	90	30	Wabasha, Anuel JAY NP	Insurance	[facility]  01/21/2020	02/25/2020	pregabalin 150 mg capsule	60	30	Wabasha, Anuel JAY NP	Insurance	[facility]  01/21/2020	01/29/2020	pregabalin 150 mg capsule	60	30	Wabasha, Anuel JAY NP	Insurance	[facility]  01/27/2020	01/29/2020	clonazepam 0.5 mg tablet	90	30	WabashaAnuel NP	Insurance	[facility]  09/03/2019	12/31/2019	lyrica 300 mg capsule	60	30	Wabasha, Anuel F NP	Insurance	[facility]  12/30/2019	12/31/2019	clonazepam 0.5 mg tablet	90	30	Wabasha, Anuel F NP	Insurance	[facility]  * - Drugs marked with an asterisk are compound drugs. If the compound drug is made up of more than one controlled substance, then each controlled substance will be a separate row in the table.

## 2020-12-28 NOTE — PROGRESS NOTE ADULT - PROBLEM SELECTOR PLAN 10
F: Caution with possible covid  E: Replete PRN, goal K>4, Mg>2  N: Regular, vegetarian  DVT PPx: Eliquis  FULL CODE  Dispo: [facility]

## 2020-12-28 NOTE — PROGRESS NOTE ADULT - PROBLEM SELECTOR PLAN 2
Pt with previous positive covid test. Reportedly hypoxic to 80s in the field. Currently denying cough or SOB. Pt SpO2 91-95% off O2 while off oxygen. Put on 2L NC. D-dimer wnl , CRP elevated  - given true hypoxia at bedside, though unclear whether driven by covid or an independent entity, will start remdesivir (12/27 - 12/31) and decadron (12/27 - 1/6)  - supplemental O2 as needed to maintain SpO2 >92%  - mISS while on decadron Pt with previous positive covid test. Reportedly hypoxic to 80s in the field. Currently denying cough or SOB. Pt SpO2 91-95% off O2 while off oxygen. Put on 2L NC. D-dimer wnl , CRP elevated  - given true hypoxia at bedside, though unclear whether driven by covid or an independent entity, will start remdesivir (12/27 - 12/31) and decadron (12/27 - 1/6)  -Currently patient is saturating well on RA 94%. Patient cannot walk, but sitting O2 is 94% RA.   - supplemental O2 as needed to maintain SpO2 >92%  - mISS while on decadron Plan as above.   -Patient has had history of multiple UTIs given neurogenic bladder from MS  -Continue antibiotics as above

## 2020-12-28 NOTE — PROGRESS NOTE ADULT - PROBLEM SELECTOR PLAN 7
- c/w oxybutynin and flomax Per notes from previous admission, had had a DVT of unknown extremity of unknown duration. Had been on eliquis 2.5mg BID per previous notes  - will start treatment with eliquis 5mg BID   - Need to obtain collateral regarding duration of Eliquis Per notes from previous admission, had had a DVT of unknown extremity of unknown duration. Had been on eliquis 2.5mg BID per previous notes  - will start treatment with Eliquis 5mg BID   - Need to obtain collateral regarding duration of Eliquis

## 2020-12-28 NOTE — PROGRESS NOTE ADULT - SUBJECTIVE AND OBJECTIVE BOX
**Incomplete Note**  OVERNIGHT EVENTS:    SUBJECTIVE / INTERVAL HPI: Patient seen and examined at bedside.     VITAL SIGNS:  Vital Signs Last 24 Hrs  T(C): 36.9 (27 Dec 2020 20:15), Max: 38.7 (27 Dec 2020 12:11)  T(F): 98.5 (27 Dec 2020 20:15), Max: 101.7 (27 Dec 2020 12:11)  HR: 71 (27 Dec 2020 20:15) (71 - 103)  BP: 98/60 (27 Dec 2020 20:15) (98/60 - 112/70)  BP(mean): --  RR: 18 (27 Dec 2020 20:15) (18 - 22)  SpO2: 97% (27 Dec 2020 20:15) (96% - 100%)    12-27-20 @ 07:01  -  12-28-20 @ 07:00  --------------------------------------------------------  IN: 1050 mL / OUT: 675 mL / NET: 375 mL        PHYSICAL EXAM:    General: WDWN  HEENT: NC/AT; PERRL, anicteric sclera; MMM  Neck: supple  Cardiovascular: +S1/S2; RRR  Respiratory: CTA B/L; no W/R/R  Gastrointestinal: soft, NT/ND; +BSx4  Extremities: WWP; no edema, clubbing or cyanosis  Vascular: 2+ radial, DP/PT pulses B/L  Neurological: AAOx3; no focal deficits    MEDICATIONS:  MEDICATIONS  (STANDING):  apixaban 5 milliGRAM(s) Oral every 12 hours  cefTRIAXone   IVPB 1000 milliGRAM(s) IV Intermittent every 24 hours  clonazePAM  Tablet 1 milliGRAM(s) Oral at bedtime  dexAMETHasone     Tablet 6 milliGRAM(s) Oral daily  dextrose 40% Gel 15 Gram(s) Oral once  dextrose 5%. 1000 milliLiter(s) (50 mL/Hr) IV Continuous <Continuous>  dextrose 5%. 1000 milliLiter(s) (100 mL/Hr) IV Continuous <Continuous>  dextrose 50% Injectable 25 Gram(s) IV Push once  escitalopram 20 milliGRAM(s) Oral daily  famotidine    Tablet 20 milliGRAM(s) Oral daily  glucagon  Injectable 1 milliGRAM(s) IntraMuscular once  insulin lispro (ADMELOG) corrective regimen sliding scale   SubCutaneous Before meals and at bedtime  levETIRAcetam 250 milliGRAM(s) Oral two times a day  oxybutynin 5 milliGRAM(s) Oral two times a day  polyethylene glycol 3350 17 Gram(s) Oral every 24 hours  pregabalin 150 milliGRAM(s) Oral two times a day  remdesivir  IVPB   IV Intermittent   remdesivir  IVPB 100 milliGRAM(s) IV Intermittent every 24 hours  senna 2 Tablet(s) Oral at bedtime  tamsulosin 0.8 milliGRAM(s) Oral at bedtime    MEDICATIONS  (PRN):  baclofen 20 milliGRAM(s) Oral three times a day PRN Muscle Spasm      ALLERGIES:  Allergies    No Known Allergies    Intolerances        LABS:                        13.6   5.00  )-----------( 245      ( 27 Dec 2020 12:27 )             41.3     12-27    141  |  101  |  15  ----------------------------<  95  3.9   |  26  |  0.53    Ca    8.4      27 Dec 2020 12:27    TPro  5.9<L>  /  Alb  3.1<L>  /  TBili  0.2  /  DBili  x   /  AST  44<H>  /  ALT  22  /  AlkPhos  76  12-27    PT/INR - ( 27 Dec 2020 12:27 )   PT: 15.0 sec;   INR: 1.26          PTT - ( 27 Dec 2020 12:27 )  PTT:37.3 sec  Urinalysis Basic - ( 27 Dec 2020 12:34 )    Color: Yellow / Appearance: Turbid / SG: >=1.030 / pH: x  Gluc: x / Ketone: 40 mg/dL  / Bili: Negative / Urobili: 0.2 E.U./dL   Blood: x / Protein: 100 mg/dL / Nitrite: POSITIVE   Leuk Esterase: Moderate / RBC: 5-10 /HPF / WBC Loaded /HPF   Sq Epi: x / Non Sq Epi: 0-5 /HPF / Bacteria: Many /HPF      CAPILLARY BLOOD GLUCOSE      POCT Blood Glucose.: 125 mg/dL (28 Dec 2020 07:01)      Culture - Blood (collected 12-27-20 @ 14:38)  Source: .Blood Blood-Peripheral  Preliminary Report (12-28-20 @ 03:00):    No growth at 12 hours    Culture - Blood (collected 12-27-20 @ 14:38)  Source: .Blood Blood-Peripheral  Preliminary Report (12-28-20 @ 03:00):    No growth at 12 hours        RADIOLOGY & ADDITIONAL TESTS: Reviewed.    ASSESSMENT:    PLAN:  OVERNIGHT EVENTS: ADRIENNE    SUBJECTIVE / INTERVAL HPI: Patient seen and examined at bedside.     VITAL SIGNS:  Vital Signs Last 24 Hrs  T(C): 36.9 (27 Dec 2020 20:15), Max: 38.7 (27 Dec 2020 12:11)  T(F): 98.5 (27 Dec 2020 20:15), Max: 101.7 (27 Dec 2020 12:11)  HR: 71 (27 Dec 2020 20:15) (71 - 103)  BP: 98/60 (27 Dec 2020 20:15) (98/60 - 112/70)  BP(mean): --  RR: 18 (27 Dec 2020 20:15) (18 - 22)  SpO2: 97% (27 Dec 2020 20:15) (96% - 100%)    12-27-20 @ 07:01  -  12-28-20 @ 07:00  --------------------------------------------------------  IN: 1050 mL / OUT: 675 mL / NET: 375 mL        PHYSICAL EXAM:    General: WDWN  HEENT: NC/AT; PERRL, anicteric sclera; MMM  Neck: supple  Cardiovascular: +S1/S2; RRR  Respiratory: CTA B/L; no W/R/R  Gastrointestinal: soft, NT/ND; +BSx4  Extremities: WWP; no edema, clubbing or cyanosis  Vascular: 2+ radial, DP/PT pulses B/L  Neurological: AAOx3; no focal deficits    MEDICATIONS:  MEDICATIONS  (STANDING):  apixaban 5 milliGRAM(s) Oral every 12 hours  cefTRIAXone   IVPB 1000 milliGRAM(s) IV Intermittent every 24 hours  clonazePAM  Tablet 1 milliGRAM(s) Oral at bedtime  dexAMETHasone     Tablet 6 milliGRAM(s) Oral daily  dextrose 40% Gel 15 Gram(s) Oral once  dextrose 5%. 1000 milliLiter(s) (50 mL/Hr) IV Continuous <Continuous>  dextrose 5%. 1000 milliLiter(s) (100 mL/Hr) IV Continuous <Continuous>  dextrose 50% Injectable 25 Gram(s) IV Push once  escitalopram 20 milliGRAM(s) Oral daily  famotidine    Tablet 20 milliGRAM(s) Oral daily  glucagon  Injectable 1 milliGRAM(s) IntraMuscular once  insulin lispro (ADMELOG) corrective regimen sliding scale   SubCutaneous Before meals and at bedtime  levETIRAcetam 250 milliGRAM(s) Oral two times a day  oxybutynin 5 milliGRAM(s) Oral two times a day  polyethylene glycol 3350 17 Gram(s) Oral every 24 hours  pregabalin 150 milliGRAM(s) Oral two times a day  remdesivir  IVPB   IV Intermittent   remdesivir  IVPB 100 milliGRAM(s) IV Intermittent every 24 hours  senna 2 Tablet(s) Oral at bedtime  tamsulosin 0.8 milliGRAM(s) Oral at bedtime    MEDICATIONS  (PRN):  baclofen 20 milliGRAM(s) Oral three times a day PRN Muscle Spasm      ALLERGIES:  Allergies    No Known Allergies    Intolerances        LABS:                        13.6   5.00  )-----------( 245      ( 27 Dec 2020 12:27 )             41.3     12-27    141  |  101  |  15  ----------------------------<  95  3.9   |  26  |  0.53    Ca    8.4      27 Dec 2020 12:27    TPro  5.9<L>  /  Alb  3.1<L>  /  TBili  0.2  /  DBili  x   /  AST  44<H>  /  ALT  22  /  AlkPhos  76  12-27    PT/INR - ( 27 Dec 2020 12:27 )   PT: 15.0 sec;   INR: 1.26          PTT - ( 27 Dec 2020 12:27 )  PTT:37.3 sec  Urinalysis Basic - ( 27 Dec 2020 12:34 )    Color: Yellow / Appearance: Turbid / SG: >=1.030 / pH: x  Gluc: x / Ketone: 40 mg/dL  / Bili: Negative / Urobili: 0.2 E.U./dL   Blood: x / Protein: 100 mg/dL / Nitrite: POSITIVE   Leuk Esterase: Moderate / RBC: 5-10 /HPF / WBC Loaded /HPF   Sq Epi: x / Non Sq Epi: 0-5 /HPF / Bacteria: Many /HPF      CAPILLARY BLOOD GLUCOSE      POCT Blood Glucose.: 125 mg/dL (28 Dec 2020 07:01)      Culture - Blood (collected 12-27-20 @ 14:38)  Source: .Blood Blood-Peripheral  Preliminary Report (12-28-20 @ 03:00):    No growth at 12 hours    Culture - Blood (collected 12-27-20 @ 14:38)  Source: .Blood Blood-Peripheral  Preliminary Report (12-28-20 @ 03:00):    No growth at 12 hours        RADIOLOGY & ADDITIONAL TESTS: Reviewed.    ASSESSMENT:    PLAN:  OVERNIGHT EVENTS: ADRIENNE    SUBJECTIVE / INTERVAL HPI: Patient seen and examined at bedside.     VITAL SIGNS:  Vital Signs Last 24 Hrs  T(C): 36.9 (27 Dec 2020 20:15), Max: 38.7 (27 Dec 2020 12:11)  T(F): 98.5 (27 Dec 2020 20:15), Max: 101.7 (27 Dec 2020 12:11)  HR: 71 (27 Dec 2020 20:15) (71 - 103)  BP: 98/60 (27 Dec 2020 20:15) (98/60 - 112/70)  BP(mean): --  RR: 18 (27 Dec 2020 20:15) (18 - 22)  SpO2: 97% (27 Dec 2020 20:15) (96% - 100%)    12-27-20 @ 07:01  -  12-28-20 @ 07:00  --------------------------------------------------------  IN: 1050 mL / OUT: 675 mL / NET: 375 mL    PHYSICAL EXAM:    General: thin female in NAD  HEENT: NC/AT; PERRL, anicteric sclera; MMM, nasal cannula in place   Neck: supple  Cardiovascular: +S1/S2; RRR  Respiratory: CTA B/L; no W/R/R  Gastrointestinal: soft, NT/ND; +BSx4  Extremities: WWP; no edema, clubbing or cyanosis  Vascular: 2+ radial, DP/PT pulses B/L  Neurological: AAOx3. Can move bilateral UE but cannot move b/L LE 2/2 MS.     MEDICATIONS:  MEDICATIONS  (STANDING):  apixaban 5 milliGRAM(s) Oral every 12 hours  cefTRIAXone   IVPB 1000 milliGRAM(s) IV Intermittent every 24 hours  clonazePAM  Tablet 1 milliGRAM(s) Oral at bedtime  dexAMETHasone     Tablet 6 milliGRAM(s) Oral daily  dextrose 40% Gel 15 Gram(s) Oral once  dextrose 5%. 1000 milliLiter(s) (50 mL/Hr) IV Continuous <Continuous>  dextrose 5%. 1000 milliLiter(s) (100 mL/Hr) IV Continuous <Continuous>  dextrose 50% Injectable 25 Gram(s) IV Push once  escitalopram 20 milliGRAM(s) Oral daily  famotidine    Tablet 20 milliGRAM(s) Oral daily  glucagon  Injectable 1 milliGRAM(s) IntraMuscular once  insulin lispro (ADMELOG) corrective regimen sliding scale   SubCutaneous Before meals and at bedtime  levETIRAcetam 250 milliGRAM(s) Oral two times a day  oxybutynin 5 milliGRAM(s) Oral two times a day  polyethylene glycol 3350 17 Gram(s) Oral every 24 hours  pregabalin 150 milliGRAM(s) Oral two times a day  remdesivir  IVPB   IV Intermittent   remdesivir  IVPB 100 milliGRAM(s) IV Intermittent every 24 hours  senna 2 Tablet(s) Oral at bedtime  tamsulosin 0.8 milliGRAM(s) Oral at bedtime    MEDICATIONS  (PRN):  baclofen 20 milliGRAM(s) Oral three times a day PRN Muscle Spasm      ALLERGIES:  Allergies    No Known Allergies    Intolerances        LABS:                        13.6   5.00  )-----------( 245      ( 27 Dec 2020 12:27 )             41.3     12-27    141  |  101  |  15  ----------------------------<  95  3.9   |  26  |  0.53    Ca    8.4      27 Dec 2020 12:27    TPro  5.9<L>  /  Alb  3.1<L>  /  TBili  0.2  /  DBili  x   /  AST  44<H>  /  ALT  22  /  AlkPhos  76  12-27    PT/INR - ( 27 Dec 2020 12:27 )   PT: 15.0 sec;   INR: 1.26          PTT - ( 27 Dec 2020 12:27 )  PTT:37.3 sec  Urinalysis Basic - ( 27 Dec 2020 12:34 )    Color: Yellow / Appearance: Turbid / SG: >=1.030 / pH: x  Gluc: x / Ketone: 40 mg/dL  / Bili: Negative / Urobili: 0.2 E.U./dL   Blood: x / Protein: 100 mg/dL / Nitrite: POSITIVE   Leuk Esterase: Moderate / RBC: 5-10 /HPF / WBC Loaded /HPF   Sq Epi: x / Non Sq Epi: 0-5 /HPF / Bacteria: Many /HPF      CAPILLARY BLOOD GLUCOSE      POCT Blood Glucose.: 125 mg/dL (28 Dec 2020 07:01)      Culture - Blood (collected 12-27-20 @ 14:38)  Source: .Blood Blood-Peripheral  Preliminary Report (12-28-20 @ 03:00):    No growth at 12 hours    Culture - Blood (collected 12-27-20 @ 14:38)  Source: .Blood Blood-Peripheral  Preliminary Report (12-28-20 @ 03:00):    No growth at 12 hours        RADIOLOGY & ADDITIONAL TESTS: Reviewed. OVERNIGHT EVENTS: ADRIENNE    SUBJECTIVE / INTERVAL HPI: Patient seen and examined at bedside. Patient states that she is anxious to go home and find HHA. She denies any symptoms - no chest pain, shortness of breath, dizziness, N/V/D.     VITAL SIGNS:  Vital Signs Last 24 Hrs  T(C): 36.9 (27 Dec 2020 20:15), Max: 38.7 (27 Dec 2020 12:11)  T(F): 98.5 (27 Dec 2020 20:15), Max: 101.7 (27 Dec 2020 12:11)  HR: 71 (27 Dec 2020 20:15) (71 - 103)  BP: 98/60 (27 Dec 2020 20:15) (98/60 - 112/70)  BP(mean): --  RR: 18 (27 Dec 2020 20:15) (18 - 22)  SpO2: 97% (27 Dec 2020 20:15) (96% - 100%)    12-27-20 @ 07:01  -  12-28-20 @ 07:00  --------------------------------------------------------  IN: 1050 mL / OUT: 675 mL / NET: 375 mL    PHYSICAL EXAM:    General: thin female in NAD  HEENT: NC/AT; PERRL, anicteric sclera; MMM, nasal cannula in place   Neck: supple  Cardiovascular: +S1/S2; RRR  Respiratory: CTA B/L; no W/R/R  Gastrointestinal: soft, NT/ND; +BSx4  Extremities: WWP; no edema, clubbing or cyanosis  Vascular: 2+ radial, DP/PT pulses B/L  Neurological: AAOx3. Can move bilateral UE but cannot move b/L LE 2/2 MS.     MEDICATIONS:  MEDICATIONS  (STANDING):  apixaban 5 milliGRAM(s) Oral every 12 hours  cefTRIAXone   IVPB 1000 milliGRAM(s) IV Intermittent every 24 hours  clonazePAM  Tablet 1 milliGRAM(s) Oral at bedtime  dexAMETHasone     Tablet 6 milliGRAM(s) Oral daily  dextrose 40% Gel 15 Gram(s) Oral once  dextrose 5%. 1000 milliLiter(s) (50 mL/Hr) IV Continuous <Continuous>  dextrose 5%. 1000 milliLiter(s) (100 mL/Hr) IV Continuous <Continuous>  dextrose 50% Injectable 25 Gram(s) IV Push once  escitalopram 20 milliGRAM(s) Oral daily  famotidine    Tablet 20 milliGRAM(s) Oral daily  glucagon  Injectable 1 milliGRAM(s) IntraMuscular once  insulin lispro (ADMELOG) corrective regimen sliding scale   SubCutaneous Before meals and at bedtime  levETIRAcetam 250 milliGRAM(s) Oral two times a day  oxybutynin 5 milliGRAM(s) Oral two times a day  polyethylene glycol 3350 17 Gram(s) Oral every 24 hours  pregabalin 150 milliGRAM(s) Oral two times a day  remdesivir  IVPB   IV Intermittent   remdesivir  IVPB 100 milliGRAM(s) IV Intermittent every 24 hours  senna 2 Tablet(s) Oral at bedtime  tamsulosin 0.8 milliGRAM(s) Oral at bedtime    MEDICATIONS  (PRN):  baclofen 20 milliGRAM(s) Oral three times a day PRN Muscle Spasm      ALLERGIES:  Allergies    No Known Allergies    Intolerances        LABS:                        13.6   5.00  )-----------( 245      ( 27 Dec 2020 12:27 )             41.3     12-27    141  |  101  |  15  ----------------------------<  95  3.9   |  26  |  0.53    Ca    8.4      27 Dec 2020 12:27    TPro  5.9<L>  /  Alb  3.1<L>  /  TBili  0.2  /  DBili  x   /  AST  44<H>  /  ALT  22  /  AlkPhos  76  12-27    PT/INR - ( 27 Dec 2020 12:27 )   PT: 15.0 sec;   INR: 1.26          PTT - ( 27 Dec 2020 12:27 )  PTT:37.3 sec  Urinalysis Basic - ( 27 Dec 2020 12:34 )    Color: Yellow / Appearance: Turbid / SG: >=1.030 / pH: x  Gluc: x / Ketone: 40 mg/dL  / Bili: Negative / Urobili: 0.2 E.U./dL   Blood: x / Protein: 100 mg/dL / Nitrite: POSITIVE   Leuk Esterase: Moderate / RBC: 5-10 /HPF / WBC Loaded /HPF   Sq Epi: x / Non Sq Epi: 0-5 /HPF / Bacteria: Many /HPF      CAPILLARY BLOOD GLUCOSE      POCT Blood Glucose.: 125 mg/dL (28 Dec 2020 07:01)      Culture - Blood (collected 12-27-20 @ 14:38)  Source: .Blood Blood-Peripheral  Preliminary Report (12-28-20 @ 03:00):    No growth at 12 hours    Culture - Blood (collected 12-27-20 @ 14:38)  Source: .Blood Blood-Peripheral  Preliminary Report (12-28-20 @ 03:00):    No growth at 12 hours        RADIOLOGY & ADDITIONAL TESTS: Reviewed.

## 2020-12-28 NOTE — PROGRESS NOTE ADULT - PROBLEM SELECTOR PLAN 9
Per notes from previous admission, had had a DVT of unknown extremity of unknown duration. Had been on eliquis 2.5mg BID per previous notes  - will start treatment with eliquis 5mg BID   - obtain collateral in AM regarding date/details of DVT

## 2020-12-28 NOTE — PROGRESS NOTE ADULT - PROBLEM SELECTOR PLAN 1
Meeting SIRS criteria on admission with fever, tachycardia, tachypnea. UA grossly positive. Pt also with previous covid positive. No leukocytosis. Lactate 0.8. Procalcitonin 1.07. Received 1L in ED.  - f/u blood culture  - f/u urine culture  - trend fever curve  - tylenol prn for fever  - ceftriaxone 1g QD for UTI Meeting SIRS criteria on admission with fever, tachycardia, tachypnea. UA grossly positive. Pt also with previous covid positive. No leukocytosis. Lactate 0.8. Procalcitonin 1.07. Received 1L in ED.  -Urine cx growing Enterococcus faecalis and E coli   -Blood culture NGTD  Plan:   -change ceftriaxone to ampicillin 2g q6H. Can change to amoxicillin on discharge   -F/u sensitivities of urine culture   - ceftriaxone 1g QD for UTI

## 2020-12-28 NOTE — PROGRESS NOTE ADULT - PROBLEM SELECTOR PLAN 6
- c/w keppra 250mg BID -Per medrec, patient on Wellbutrin 200mg per day.   -Holding in the setting of lowering seizure threshold.     #Anxiety  Also on clonazepam 1.5mg qhs - unclear whether for anxiety or some other indication  -Restarted 1.5mg qHS -Per medrec, patient on Wellbutrin 200mg per day.   -Will restart inpatient. Per outside records, neurologists aware of Keppra and Wellbutrin, no contraindication noted.     #Anxiety  Also on clonazepam 1.5mg qhs - unclear whether for anxiety or some other indication  -Restarted 1.5mg qHS

## 2020-12-28 NOTE — PROGRESS NOTE ADULT - ATTENDING COMMENTS
Added on Ampicillin today for E fecalis in urine >100K colony forming units.   # Metabolic encephalopathy (present on admission)   Likely from sepsis due to COVID19 and/or Urinary Tract infection (has history of recurrent UTIs, UCx growing >100k CFU ecoli, e faecalis.  Improved with treatment of UTI (antibiotics) and COVID19 (remdesivir, decadron)    Rest of history and plan per Dr. South's note above

## 2020-12-28 NOTE — PROGRESS NOTE ADULT - PROBLEM SELECTOR PLAN 8
Unclear home wellbutrin dose. Given seizure history, wellbutrin may not be best agent.    - med rec in AM    #?Anxiety  Also on clonazepam 1.5mg qhs - unclear whether for anxiety or some other indication  - restarted 1mg qhs given +Utox and risk for withdrawal  - med rec in AM F: none at this time   E: Replete PRN, goal K>4, Mg>2  N: Regular, vegetarian  DVT PPx: Eliquis  FULL CODE  Dispo: [facility]

## 2020-12-28 NOTE — PROGRESS NOTE ADULT - PROBLEM SELECTOR PLAN 4
Bedbound at baseline  - c/w baclofen Bedbound at baseline. Complications include neurogenic bladder and frequent UTIs. Per med rec, patient is not on baclofen.   -Continue to monitor   -Continue Lyrica 150mg BID    #Neurogenic bladder   -Continue Flomax and Oxybutynin

## 2020-12-28 NOTE — PROGRESS NOTE ADULT - ASSESSMENT
Ms. Chari Reilly is a 55F with a PMHx of multiple sclerosis (bedbound at baseline), intracranial hemorrhage, HTN, seizure disorder who presents with fever and AMS, likely 2/2 urosepsis. Ms. Chari Reilly is a 55F with a PMHx of multiple sclerosis (bedbound at baseline), intracranial hemorrhage, HTN, seizure disorder who presents with fever and AMS, likely sepsis 2/2 UTI.

## 2020-12-29 ENCOUNTER — TRANSCRIPTION ENCOUNTER (OUTPATIENT)
Age: 55
End: 2020-12-29

## 2020-12-29 LAB
-  AMPICILLIN/SULBACTAM: SIGNIFICANT CHANGE UP
-  AMPICILLIN/SULBACTAM: SIGNIFICANT CHANGE UP
-  AMPICILLIN: SIGNIFICANT CHANGE UP
-  CEFAZOLIN: SIGNIFICANT CHANGE UP
-  CEFAZOLIN: SIGNIFICANT CHANGE UP
-  CEFTRIAXONE: SIGNIFICANT CHANGE UP
-  CEFTRIAXONE: SIGNIFICANT CHANGE UP
-  CIPROFLOXACIN: SIGNIFICANT CHANGE UP
-  CIPROFLOXACIN: SIGNIFICANT CHANGE UP
-  GENTAMICIN: SIGNIFICANT CHANGE UP
-  LEVOFLOXACIN: SIGNIFICANT CHANGE UP
-  NITROFURANTOIN: SIGNIFICANT CHANGE UP
-  PIPERACILLIN/TAZOBACTAM: SIGNIFICANT CHANGE UP
-  TETRACYCLINE: SIGNIFICANT CHANGE UP
-  TOBRAMYCIN: SIGNIFICANT CHANGE UP
-  TRIMETHOPRIM/SULFAMETHOXAZOLE: SIGNIFICANT CHANGE UP
-  VANCOMYCIN: SIGNIFICANT CHANGE UP
ALBUMIN SERPL ELPH-MCNC: 2.9 G/DL — LOW (ref 3.3–5)
ALP SERPL-CCNC: 73 U/L — SIGNIFICANT CHANGE UP (ref 40–120)
ALT FLD-CCNC: 21 U/L — SIGNIFICANT CHANGE UP (ref 10–45)
ANION GAP SERPL CALC-SCNC: 10 MMOL/L — SIGNIFICANT CHANGE UP (ref 5–17)
AST SERPL-CCNC: 38 U/L — SIGNIFICANT CHANGE UP (ref 10–40)
BASOPHILS # BLD AUTO: 0 K/UL — SIGNIFICANT CHANGE UP (ref 0–0.2)
BASOPHILS NFR BLD AUTO: 0 % — SIGNIFICANT CHANGE UP (ref 0–2)
BILIRUB SERPL-MCNC: 0.2 MG/DL — SIGNIFICANT CHANGE UP (ref 0.2–1.2)
BUN SERPL-MCNC: 15 MG/DL — SIGNIFICANT CHANGE UP (ref 7–23)
BURR CELLS BLD QL SMEAR: PRESENT — SIGNIFICANT CHANGE UP
CALCIUM SERPL-MCNC: 8.2 MG/DL — LOW (ref 8.4–10.5)
CHLORIDE SERPL-SCNC: 107 MMOL/L — SIGNIFICANT CHANGE UP (ref 96–108)
CO2 SERPL-SCNC: 26 MMOL/L — SIGNIFICANT CHANGE UP (ref 22–31)
CREAT SERPL-MCNC: 0.43 MG/DL — LOW (ref 0.5–1.3)
CRP SERPL-MCNC: 2.34 MG/DL — HIGH (ref 0–0.4)
CULTURE RESULTS: SIGNIFICANT CHANGE UP
CULTURE RESULTS: SIGNIFICANT CHANGE UP
EOSINOPHIL # BLD AUTO: 0 K/UL — SIGNIFICANT CHANGE UP (ref 0–0.5)
EOSINOPHIL NFR BLD AUTO: 0 % — SIGNIFICANT CHANGE UP (ref 0–6)
FERRITIN SERPL-MCNC: 501 NG/ML — HIGH (ref 15–150)
GIANT PLATELETS BLD QL SMEAR: PRESENT — SIGNIFICANT CHANGE UP
GLUCOSE BLDC GLUCOMTR-MCNC: 108 MG/DL — HIGH (ref 70–99)
GLUCOSE BLDC GLUCOMTR-MCNC: 95 MG/DL — SIGNIFICANT CHANGE UP (ref 70–99)
GLUCOSE BLDC GLUCOMTR-MCNC: 96 MG/DL — SIGNIFICANT CHANGE UP (ref 70–99)
GLUCOSE BLDC GLUCOMTR-MCNC: 97 MG/DL — SIGNIFICANT CHANGE UP (ref 70–99)
GLUCOSE SERPL-MCNC: 116 MG/DL — HIGH (ref 70–99)
HCT VFR BLD CALC: 40.4 % — SIGNIFICANT CHANGE UP (ref 34.5–45)
HGB BLD-MCNC: 12.8 G/DL — SIGNIFICANT CHANGE UP (ref 11.5–15.5)
LYMPHOCYTES # BLD AUTO: 1.51 K/UL — SIGNIFICANT CHANGE UP (ref 1–3.3)
LYMPHOCYTES # BLD AUTO: 37.2 % — SIGNIFICANT CHANGE UP (ref 13–44)
MAGNESIUM SERPL-MCNC: 2.4 MG/DL — SIGNIFICANT CHANGE UP (ref 1.6–2.6)
MANUAL SMEAR VERIFICATION: SIGNIFICANT CHANGE UP
MCHC RBC-ENTMCNC: 30 PG — SIGNIFICANT CHANGE UP (ref 27–34)
MCHC RBC-ENTMCNC: 31.7 GM/DL — LOW (ref 32–36)
MCV RBC AUTO: 94.8 FL — SIGNIFICANT CHANGE UP (ref 80–100)
METAMYELOCYTES # FLD: 1.8 % — HIGH (ref 0–0)
METHOD TYPE: SIGNIFICANT CHANGE UP
MONOCYTES # BLD AUTO: 0.61 K/UL — SIGNIFICANT CHANGE UP (ref 0–0.9)
MONOCYTES NFR BLD AUTO: 15 % — HIGH (ref 2–14)
MYELOCYTES NFR BLD: 0.9 % — HIGH (ref 0–0)
NEUTROPHILS # BLD AUTO: 1.83 K/UL — SIGNIFICANT CHANGE UP (ref 1.8–7.4)
NEUTROPHILS NFR BLD AUTO: 44.2 % — SIGNIFICANT CHANGE UP (ref 43–77)
NEUTS BAND # BLD: 0.9 % — SIGNIFICANT CHANGE UP (ref 0–8)
NRBC # BLD: 1 /100 — HIGH (ref 0–0)
NRBC # BLD: SIGNIFICANT CHANGE UP /100 WBCS (ref 0–0)
ORGANISM # SPEC MICROSCOPIC CNT: SIGNIFICANT CHANGE UP
OVALOCYTES BLD QL SMEAR: SLIGHT — SIGNIFICANT CHANGE UP
PHOSPHATE SERPL-MCNC: 2.8 MG/DL — SIGNIFICANT CHANGE UP (ref 2.5–4.5)
PLAT MORPH BLD: ABNORMAL
PLATELET # BLD AUTO: 332 K/UL — SIGNIFICANT CHANGE UP (ref 150–400)
POIKILOCYTOSIS BLD QL AUTO: SLIGHT — SIGNIFICANT CHANGE UP
POLYCHROMASIA BLD QL SMEAR: SLIGHT — SIGNIFICANT CHANGE UP
POTASSIUM SERPL-MCNC: 4.1 MMOL/L — SIGNIFICANT CHANGE UP (ref 3.5–5.3)
POTASSIUM SERPL-SCNC: 4.1 MMOL/L — SIGNIFICANT CHANGE UP (ref 3.5–5.3)
PROCALCITONIN SERPL-MCNC: 0.4 NG/ML — HIGH (ref 0.02–0.1)
PROT SERPL-MCNC: 5.6 G/DL — LOW (ref 6–8.3)
RBC # BLD: 4.26 M/UL — SIGNIFICANT CHANGE UP (ref 3.8–5.2)
RBC # FLD: 13.8 % — SIGNIFICANT CHANGE UP (ref 10.3–14.5)
RBC BLD AUTO: ABNORMAL
SMUDGE CELLS # BLD: PRESENT — SIGNIFICANT CHANGE UP
SODIUM SERPL-SCNC: 143 MMOL/L — SIGNIFICANT CHANGE UP (ref 135–145)
SPECIMEN SOURCE: SIGNIFICANT CHANGE UP
WBC # BLD: 4.06 K/UL — SIGNIFICANT CHANGE UP (ref 3.8–10.5)
WBC # FLD AUTO: 4.06 K/UL — SIGNIFICANT CHANGE UP (ref 3.8–10.5)

## 2020-12-29 PROCEDURE — 99233 SBSQ HOSP IP/OBS HIGH 50: CPT | Mod: CS,GC

## 2020-12-29 RX ORDER — APIXABAN 2.5 MG/1
1 TABLET, FILM COATED ORAL
Qty: 0 | Refills: 0 | DISCHARGE

## 2020-12-29 RX ORDER — AMOXICILLIN 250 MG/5ML
1 SUSPENSION, RECONSTITUTED, ORAL (ML) ORAL
Qty: 22 | Refills: 0
Start: 2020-12-29 | End: 2021-01-08

## 2020-12-29 RX ORDER — AMOXICILLIN 250 MG/5ML
1 SUSPENSION, RECONSTITUTED, ORAL (ML) ORAL
Qty: 20 | Refills: 0
Start: 2020-12-29 | End: 2021-01-07

## 2020-12-29 RX ORDER — APIXABAN 2.5 MG/1
1 TABLET, FILM COATED ORAL
Qty: 60 | Refills: 0
Start: 2020-12-29 | End: 2021-01-27

## 2020-12-29 RX ORDER — CIPROFLOXACIN LACTATE 400MG/40ML
1 VIAL (ML) INTRAVENOUS
Qty: 8 | Refills: 0
Start: 2020-12-29 | End: 2021-01-01

## 2020-12-29 RX ORDER — APIXABAN 2.5 MG/1
1 TABLET, FILM COATED ORAL
Qty: 0 | Refills: 0 | DISCHARGE
Start: 2020-12-29

## 2020-12-29 RX ADMIN — Medication 216 GRAM(S): at 09:54

## 2020-12-29 RX ADMIN — TAMSULOSIN HYDROCHLORIDE 0.8 MILLIGRAM(S): 0.4 CAPSULE ORAL at 21:08

## 2020-12-29 RX ADMIN — Medication 150 MILLIGRAM(S): at 07:27

## 2020-12-29 RX ADMIN — Medication 1.5 MILLIGRAM(S): at 21:08

## 2020-12-29 RX ADMIN — Medication 150 MILLIGRAM(S): at 19:36

## 2020-12-29 RX ADMIN — BUPROPION HYDROCHLORIDE 200 MILLIGRAM(S): 150 TABLET, EXTENDED RELEASE ORAL at 13:49

## 2020-12-29 RX ADMIN — Medication 6 MILLIGRAM(S): at 06:36

## 2020-12-29 RX ADMIN — LEVETIRACETAM 250 MILLIGRAM(S): 250 TABLET, FILM COATED ORAL at 06:36

## 2020-12-29 RX ADMIN — Medication 5 MILLIGRAM(S): at 17:54

## 2020-12-29 RX ADMIN — FAMOTIDINE 20 MILLIGRAM(S): 10 INJECTION INTRAVENOUS at 13:49

## 2020-12-29 RX ADMIN — APIXABAN 5 MILLIGRAM(S): 2.5 TABLET, FILM COATED ORAL at 06:36

## 2020-12-29 RX ADMIN — REMDESIVIR 500 MILLIGRAM(S): 5 INJECTION INTRAVENOUS at 19:23

## 2020-12-29 RX ADMIN — Medication 216 GRAM(S): at 15:26

## 2020-12-29 RX ADMIN — ESCITALOPRAM OXALATE 20 MILLIGRAM(S): 10 TABLET, FILM COATED ORAL at 13:48

## 2020-12-29 RX ADMIN — Medication 216 GRAM(S): at 03:04

## 2020-12-29 RX ADMIN — POLYETHYLENE GLYCOL 3350 17 GRAM(S): 17 POWDER, FOR SOLUTION ORAL at 06:37

## 2020-12-29 RX ADMIN — LEVETIRACETAM 250 MILLIGRAM(S): 250 TABLET, FILM COATED ORAL at 17:53

## 2020-12-29 RX ADMIN — APIXABAN 5 MILLIGRAM(S): 2.5 TABLET, FILM COATED ORAL at 17:54

## 2020-12-29 RX ADMIN — Medication 216 GRAM(S): at 21:08

## 2020-12-29 RX ADMIN — Medication 5 MILLIGRAM(S): at 06:37

## 2020-12-29 NOTE — PROGRESS NOTE ADULT - PROBLEM SELECTOR PLAN 1
Meeting SIRS criteria on admission with fever, tachycardia, tachypnea. UA grossly positive. Pt also with previous covid positive. No leukocytosis. Lactate 0.8. Procalcitonin 1.07. Received 1L in ED.  -Urine cx growing Enterococcus faecalis and E coli, sensitive to amoxicillin   -Blood culture NGTD  Plan:   -change ceftriaxone to ampicillin 2g q6H. Can change to amoxicillin on discharge

## 2020-12-29 NOTE — DISCHARGE NOTE PROVIDER - NSDCFUSCHEDAPPT_GEN_ALL_CORE_FT
KIMBERLY NEGRO ; 01/05/2021 ; [facility] [address] KIMBERLY NEGRO ; 01/05/2021 ; [facility] [address]  KIMBERLY NEGRO ; 02/03/2021 ; \[facility]\"" [facility] [address]

## 2020-12-29 NOTE — DISCHARGE NOTE PROVIDER - HOSPITAL COURSE
#Discharge: do not delete    Patient is __ yo M/F with past medical history of _____  Presented with _____, found to have _____  Problem List/Main Diagnoses (system-based):   Inpatient treatment course:   New medications:   Labs to be followed outpatient:   Exam to be followed outpatient:    Inpatient Treatment Course:   Ms. Chari Reilly is a 55F with a PMHx of multiple sclerosis (bedbound at baseline), intracranial hemorrhage, HTN, seizure disorder who presents with fever and AMS, likely due to sepsis 2/2 UTI, also found to be COVID positive.     Patient is __ yo M/F with past medical history of _____  Presented with _____, found to have _____  Problem List/Main Diagnoses (system-based):   Inpatient treatment course:   New medications:   Labs to be followed outpatient:   Exam to be followed outpatient:    Inpatient Treatment Course:   Ms. Chari Reilly is a 55F with a PMHx of multiple sclerosis (bedbound at baseline), intracranial hemorrhage, HTN, seizure disorder who presents with fever and AMS, likely due to sepsis 2/2 UTI, also found to be COVID positive. Patient was started on remdesivir (completed 2 out of 5 days) and decadron (completed 3 out of 10 days) as well as ampicillin for UTI. Urine cultures positive for Ecoli and E Faecalis. Patient required minimal supplemental oxygen, and will be discharged without oxygen as she was saturating 91-92% on discharge.     Problem List/Main Diagnoses (system-based):   #Sepsis 2/2 UTI    Meeting SIRS criteria on admission with fever, tachycardia, tachypnea. UA grossly positive. Pt also with previous COVID positive. No leukocytosis. Lactate 0.8. Procalcitonin 1.07. Received 1L in ED.  -Urine cx growing Enterococcus faecalis and E coli, will be sending patient home on PO amoxicillin   -Blood culture NGTD    #UTI (urinary tract infection  Plan as above    -Patient has had history of multiple UTIs given neurogenic bladder from MS  -Continue antibiotics as above.     #COVID-19   Pt with previous positive COVID test. Reportedly hypoxic to 80s in the field. Currently denying cough or SOB. Pt SpO2 91-95% off O2 while off oxygen. Put on 2L NC. D-dimer wnl , CRP elevated  - given true hypoxia at bedside, though unclear whether driven by covid or an independent entity, will start remdesivir (12/27 - 12/31) and decadron (12/27 - 1/6)  -Currently patient is saturating well on RA 94%. Patient cannot walk, but sitting O2 is 94% RA.   - supplemental O2 as needed to maintain SpO2 >92%  - mISS while on decadron.      Problem/Plan - 4:  ·  Problem: MS (multiple sclerosis).  Plan: Bedbound at baseline. Complications include neurogenic bladder and frequent UTIs. Per med rec, patient is not on baclofen.   -Continue to monitor   -Continue Lyrica 150mg BID    #Neurogenic bladder   -Continue Flomax and Oxybutynin.      Problem/Plan - 5:  ·  Problem: Seizure.  Plan: VEEG on previous admission w/ seizure activity  - c/w Keppra 250mg BID (per most recent outpatient neuro notes)  - Neuro exam baseline - LE weakness>UE weakness, bedbound at baseline.      Problem/Plan - 6:  Problem: Depression, unspecified depression type. Plan: -Per medrec, patient on Wellbutrin 200mg per day.   -Will restart inpatient. Per outside records, neurologists aware of Keppra and Wellbutrin, no contraindication noted.     #Anxiety  Also on clonazepam 1.5mg qhs - unclear whether for anxiety or some other indication  -Restarted 1.5mg qHS.     Problem/Plan - 7:  ·  Problem: Deep vein thrombosis (DVT) of non-extremity vein, unspecified chronicity.  Plan: Per notes from previous admission, had had a DVT of unknown extremity of unknown duration. Had been on eliquis 2.5mg BID per previous notes  - will start treatment with Eliquis 5mg BID   - Need to obtain collateral regarding duration of Eliquis.  Inpatient treatment course:   New medications:   Labs to be followed outpatient:   Exam to be followed outpatient:    Inpatient Treatment Course:   Ms. Chari Reilly is a 55F with a PMHx of multiple sclerosis (bedbound at baseline), intracranial hemorrhage, HTN, seizure disorder who presents with fever and AMS, likely due to sepsis 2/2 UTI, also found to be COVID positive. Patient was started on remdesivir (completed 2 out of 5 days) and decadron (completed 3 out of 10 days) as well as ampicillin for UTI. Urine cultures positive for Ecoli and E Faecalis. Patient required minimal supplemental oxygen, and will be discharged without oxygen as she was saturating 91-92% on discharge.     Problem List/Main Diagnoses (system-based):   #Sepsis 2/2 UTI    Meeting SIRS criteria on admission with fever, tachycardia, tachypnea. UA grossly positive. Pt also with previous COVID positive. No leukocytosis. Lactate 0.8. Procalcitonin 1.07. Received 1L in ED.  -Urine cx growing Enterococcus faecalis and E coli, will be sending patient home on PO amoxicillin   -Blood culture NGTD    #UTI (urinary tract infection  Plan as above    -Patient has had history of multiple UTIs given neurogenic bladder from MS  -Continue antibiotics as above.     #COVID-19   Pt with previous positive COVID test. Reportedly hypoxic to 80s in the field. Currently denying cough or SOB. Pt SpO2 91-95% off O2 while off oxygen. Put on 2L NC. D-dimer wnl , CRP elevated  - S/p 2 days of remdesivir and 3 days of decadron   -Currently patient is saturating well on RA 94%. Patient cannot walk, but sitting O2 is 94% RA.   - Patient does not require supplemental oxygen on discharge - at home, can maintain oxygen >90% RA.     #MS (multiple sclerosis)   Bedbound at baseline. Complications include neurogenic bladder and frequent UTIs. Per med rec, patient is not on baclofen.   -Continue Lyrica 150mg BID    #Neurogenic bladder   -Continue Flomax and Oxybutynin.     #Seizure   VEEG on previous admission w/ seizure activity  - c/w Keppra 250mg BID (per most recent outpatient neuro notes)  - Neuro exam baseline - LE weakness>UE weakness, bedbound at baseline     #Depression, unspecified depression type  -Per medrec, patient on Wellbutrin 200mg per day.   -Will restart inpatient. Per outside records, neurologists aware of Keppra and Wellbutrin, no contraindication with Keppra noted.     #Anxiety  Also on clonazepam 1.5mg qhs  -Restarted 1.5mg qHS.    #Deep vein thrombosis (DVT) of non-extremity vein, unspecified chronicity  Per notes from previous admission, had had a DVT of unknown extremity of unknown duration. Had been on eliquis 2.5mg BID per previous notes  - will start treatment with Eliquis 5mg BID given no indication     New medications: Amoxicillin 875mg BID, Eliquis 5mg BID   Labs to be followed outpatient: none  Exam to be followed outpatient: none   Inpatient Treatment Course:   Ms. Chari Reilly is a 55F with a PMHx of multiple sclerosis (bedbound at baseline), intracranial hemorrhage, HTN, seizure disorder who presents with fever and AMS, likely due to sepsis 2/2 UTI, also found to be COVID positive. Patient was started on remdesivir (completed 2 out of 5 days) and decadron (completed 3 out of 10 days) as well as ampicillin for UTI. Urine cultures positive for Ecoli and E Faecalis. Patient required minimal supplemental oxygen, and will be discharged without oxygen as she was saturating 91-92% on discharge.     Problem List/Main Diagnoses (system-based):   #Sepsis 2/2 UTI    Meeting SIRS criteria on admission with fever, tachycardia, tachypnea. UA grossly positive. Pt also with previous COVID positive. No leukocytosis. Lactate 0.8. Procalcitonin 1.07. Received 1L in ED.  -Urine cx growing Enterococcus faecalis and E coli, will be sending patient home on PO amoxicillin   -Blood culture NGTD    #UTI (urinary tract infection  Plan as above    -Patient has had history of multiple UTIs given neurogenic bladder from MS  -Continue antibiotics as above.     #COVID-19   Pt with previous positive COVID test. Reportedly hypoxic to 80s in the field. Currently denying cough or SOB. Pt SpO2 91-95% off O2 while off oxygen. Put on 2L NC. D-dimer wnl , CRP elevated  - S/p 2 days of remdesivir and 3 days of decadron   -Currently patient is saturating well on RA 94%. Patient cannot walk, but sitting O2 is 94% RA.   - Patient does not require supplemental oxygen on discharge - at home, can maintain oxygen >90% RA.     #MS (multiple sclerosis)   Bedbound at baseline. Complications include neurogenic bladder and frequent UTIs. Per med rec, patient is not on baclofen.   -Continue Lyrica 150mg BID    #Neurogenic bladder   -Continue Flomax and Oxybutynin.     #Seizure   VEEG on previous admission w/ seizure activity  - c/w Keppra 250mg BID (per most recent outpatient neuro notes)  - Neuro exam baseline - LE weakness>UE weakness, bedbound at baseline     #Depression, unspecified depression type  -Per medrec, patient on Wellbutrin 200mg per day.   -Will restart inpatient. Per outside records, neurologists aware of Keppra and Wellbutrin, no contraindication with Keppra noted.     #Anxiety  Also on clonazepam 1.5mg qhs  -Restarted 1.5mg qHS.    #Deep vein thrombosis (DVT) of non-extremity vein, unspecified chronicity  Per notes from previous admission, had had a DVT of unknown extremity of unknown duration. Had been on eliquis 2.5mg BID per previous notes  - will start treatment with Eliquis 5mg BID given no indication     New medications: Ciprofloxacin 500mg BID x 4 days, Eliquis 5mg BID   Labs to be followed outpatient: none  Exam to be followed outpatient: none   Inpatient Treatment Course:   Ms. Chari Reilly is a 55F with a PMHx of multiple sclerosis (bedbound at baseline), intracranial hemorrhage, HTN, seizure disorder who presents with fever and AMS, likely due to sepsis 2/2 UTI, also found to be COVID positive. Patient was started on remdesivir (completed 2 out of 5 days) and decadron (completed 3 out of 10 days) as well as ampicillin for UTI. Urine cultures positive for Ecoli and E Faecalis. Patient required minimal supplemental oxygen, and will be discharged without oxygen as she was saturating 91-92% on discharge.     Problem List/Main Diagnoses (system-based):   #Sepsis 2/2 UTI    Meeting SIRS criteria on admission with fever, tachycardia, tachypnea. UA grossly positive. Pt also with previous COVID positive. No leukocytosis. Lactate 0.8. Procalcitonin 1.07. Received 1L in ED.  -Urine cx growing Enterococcus faecalis and E coli, will be sending patient home on PO amoxicillin   -Blood culture NGTD    #UTI (urinary tract infection  Plan as above    -Patient has had history of multiple UTIs given neurogenic bladder from MS  -Continue antibiotics as above.     #COVID-19   Pt with previous positive COVID test. Reportedly hypoxic to 80s in the field. Currently denying cough or SOB. Pt SpO2 91-95% off O2 while off oxygen. Put on 2L NC. D-dimer wnl , CRP elevated  - S/p 2 days of remdesivir and 3 days of decadron   -Currently patient is saturating well on RA 94%. Patient cannot walk, but sitting O2 is 94% RA.   - Patient does not require supplemental oxygen on discharge - at home, can maintain oxygen >90% RA.     #MS (multiple sclerosis)   Bedbound at baseline. Complications include neurogenic bladder and frequent UTIs. Per med rec, patient is not on baclofen.   -Continue Lyrica 150mg BID    #Neurogenic bladder   -Continue Flomax and Oxybutynin.     #Seizure   VEEG on previous admission w/ seizure activity  - c/w Keppra 250mg BID (per most recent outpatient neuro notes)  - Neuro exam baseline - LE weakness>UE weakness, bedbound at baseline     #Depression, unspecified depression type  -Per medrec, patient on Wellbutrin 200mg per day.   -Will restart inpatient. Per outside records, neurologists aware of Keppra and Wellbutrin, no contraindication with Keppra noted.     #Anxiety  Also on clonazepam 1.5mg qhs  -Restarted 1.5mg qHS.    #Deep vein thrombosis (DVT) of non-extremity vein, unspecified chronicity  Per notes from previous admission, had had a DVT of unknown extremity of unknown duration. Had been on eliquis 2.5mg BID per previous notes  - will start treatment with Eliquis 5mg BID given no indication for decreased dosing     New medications: Ciprofloxacin 500mg BID x 4 days, Eliquis 5mg BID   Labs to be followed outpatient: none  Exam to be followed outpatient: none

## 2020-12-29 NOTE — PROGRESS NOTE ADULT - PROBLEM SELECTOR PLAN 6
-Per medrec, patient on Wellbutrin 200mg per day.   -Will restart inpatient. Per outside records, neurologists aware of Keppra and Wellbutrin, no contraindication noted.     #Anxiety  Also on clonazepam 1.5mg qhs - unclear whether for anxiety or some other indication  -Restarted 1.5mg qHS

## 2020-12-29 NOTE — PROGRESS NOTE ADULT - PROBLEM SELECTOR PLAN 5
VEEG on previous admission w/ seizure activity  - c/w Keppra 250mg BID (per most recent outpatient neuro notes)  - Neuro exam baseline - LE weakness>UE weakness, bedbound at baseline.

## 2020-12-29 NOTE — DISCHARGE NOTE NURSING/CASE MANAGEMENT/SOCIAL WORK - PATIENT PORTAL LINK FT
You can access the FollowMyHealth Patient Portal offered by [facility] by registering at the following website: [email or web address]. By joining [employer]’s FollowMyHealth portal, you will also be able to view your health information using other applications (apps) compatible with our system.

## 2020-12-29 NOTE — PROGRESS NOTE ADULT - PROBLEM SELECTOR PLAN 3
Pt with previous positive COVID test. Reportedly hypoxic to 80s in the field. Currently denying cough or SOB. Pt SpO2 91-95% off O2 while off oxygen. Put on 2L NC. D-dimer wnl , CRP elevated  - given true hypoxia at bedside, though unclear whether driven by covid or an independent entity, will start remdesivir (12/27 - 12/31) and decadron (12/27 - 1/6)  -Currently patient is saturating well on RA 94%. Patient cannot walk, but sitting O2 is 94% RA.   - mISS while on decadron

## 2020-12-29 NOTE — PROGRESS NOTE ADULT - PROBLEM SELECTOR PLAN 4
Bedbound at baseline. Complications include neurogenic bladder and frequent UTIs. Per med rec, patient is not on baclofen.   -Continue to monitor   -Continue Lyrica 150mg BID    #Neurogenic bladder   -Continue Flomax and Oxybutynin

## 2020-12-29 NOTE — DISCHARGE NOTE PROVIDER - CARE PROVIDER_API CALL
Rashi Chicas)  Internal Medicine  [address] [place] [address]  Phone: [phone]  Fax: [phone]  Follow Up Time: 1 week   Stef Washington  [address] [place] [id number]  Phone: [phone]  Fax: (   )    -  Established Patient  Follow Up Time: 2 weeks

## 2020-12-29 NOTE — DISCHARGE NOTE PROVIDER - PROVIDER TOKENS
PROVIDER:[TOKEN:[[id number]],FOLLOWUP:[1 week]] FREE:[LAST:[[place]],FIRST:[Stef],PHONE:[phone]],FAX:[(   )    -],ADDRESS:[[address] [place] [address]],FOLLOWUP:[2 weeks],ESTABLISHEDPATIENT:[T]]

## 2020-12-29 NOTE — DISCHARGE NOTE PROVIDER - NSDCMRMEDTOKEN_GEN_ALL_CORE_FT
buPROPion 200 mg/12 hours (SR) oral tablet, extended release: 1 tab(s) orally once a day  clonazePAM 0.5 mg oral tablet: 3 tab(s) orally once a day (at bedtime)  Eliquis 2.5 mg oral tablet: 1 tab(s) orally 2 times a day  escitalopram 20 mg oral tablet: 2 tab(s) orally once a day  famotidine 20 mg oral tablet: 1 tab(s) orally once a day  Florastor 250 mg oral capsule: 1 cap(s) orally 2 times a day  Hiprex 1 g oral tablet: 1 tab(s) orally 2 times a day  Keppra 250 mg oral tablet: 1 tab(s) orally 2 times a day  magnesium oxide 400 mg (240 mg elemental magnesium) oral tablet: 4 tab(s) orally once a day  oxybutynin 10 mg/24 hr oral tablet, extended release: 1 tab(s) orally once a day  pregabalin 150 mg oral capsule: 1 cap(s) orally 2 times a day  Probiotic Formula: 1 tab(s) orally once a day (Probiomed)  tamsulosin 0.4 mg oral capsule: 2 cap(s) orally once a day  valACYclovir 500 mg oral tablet: 1 tab(s) orally every 12 hours  verapamil 120 mg/24 hours oral capsule, extended release: 1 cap(s) orally once a day  Vitamin B Complex 100: 1 cap(s) orally once a day  Vitamin C 500 mg oral tablet: 1 tab(s) orally 2 times a day   amoxicillin 875 mg oral tablet: 1 tab(s) orally every 12 hours   apixaban 5 mg oral tablet: 1 tab(s) orally every 12 hours  buPROPion 200 mg/12 hours (SR) oral tablet, extended release: 1 tab(s) orally once a day  clonazePAM 0.5 mg oral tablet: 3 tab(s) orally once a day (at bedtime)  escitalopram 20 mg oral tablet: 2 tab(s) orally once a day  famotidine 20 mg oral tablet: 1 tab(s) orally once a day  Florastor 250 mg oral capsule: 1 cap(s) orally 2 times a day  Hiprex 1 g oral tablet: 1 tab(s) orally 2 times a day  Keppra 250 mg oral tablet: 1 tab(s) orally 2 times a day  magnesium oxide 400 mg (240 mg elemental magnesium) oral tablet: 4 tab(s) orally once a day  oxybutynin 10 mg/24 hr oral tablet, extended release: 1 tab(s) orally once a day  pregabalin 150 mg oral capsule: 1 cap(s) orally 2 times a day  Probiotic Formula: 1 tab(s) orally once a day (Probiomed)  tamsulosin 0.4 mg oral capsule: 2 cap(s) orally once a day  valACYclovir 500 mg oral tablet: 1 tab(s) orally every 12 hours  verapamil 120 mg/24 hours oral capsule, extended release: 1 cap(s) orally once a day  Vitamin B Complex 100: 1 cap(s) orally once a day  Vitamin C 500 mg oral tablet: 1 tab(s) orally 2 times a day   apixaban 5 mg oral tablet: 1 tab(s) orally every 12 hours  buPROPion 200 mg/12 hours (SR) oral tablet, extended release: 1 tab(s) orally once a day  ciprofloxacin 500 mg oral tablet: 1 tab(s) orally every 12 hours   clonazePAM 0.5 mg oral tablet: 3 tab(s) orally once a day (at bedtime)  escitalopram 20 mg oral tablet: 2 tab(s) orally once a day  famotidine 20 mg oral tablet: 1 tab(s) orally once a day  Florastor 250 mg oral capsule: 1 cap(s) orally 2 times a day  Hiprex 1 g oral tablet: 1 tab(s) orally 2 times a day  Keppra 250 mg oral tablet: 1 tab(s) orally 2 times a day  magnesium oxide 400 mg (240 mg elemental magnesium) oral tablet: 4 tab(s) orally once a day  oxybutynin 10 mg/24 hr oral tablet, extended release: 1 tab(s) orally once a day  pregabalin 150 mg oral capsule: 1 cap(s) orally 2 times a day  Probiotic Formula: 1 tab(s) orally once a day (Probiomed)  tamsulosin 0.4 mg oral capsule: 2 cap(s) orally once a day  valACYclovir 500 mg oral tablet: 1 tab(s) orally every 12 hours  verapamil 120 mg/24 hours oral capsule, extended release: 1 cap(s) orally once a day  Vitamin B Complex 100: 1 cap(s) orally once a day  Vitamin C 500 mg oral tablet: 1 tab(s) orally 2 times a day

## 2020-12-29 NOTE — DISCHARGE NOTE PROVIDER - NSDCCPCAREPLAN_GEN_ALL_CORE_FT
PRINCIPAL DISCHARGE DIAGNOSIS  Diagnosis: UTI (urinary tract infection)  Assessment and Plan of Treatment: You had a urinary tract infection which grew Ecoli and Enterococcus faecalis in the urine. We gave you IV medications in the hospital.   We are sending you home on an antibiotic called amoxicillin 875mg twice a day. Please continue to take for 11 days total.      SECONDARY DISCHARGE DIAGNOSES  Diagnosis: Neurogenic bladder  Assessment and Plan of Treatment: Continue Flomax and Oxybutynin    Diagnosis: DVT, lower extremity  Assessment and Plan of Treatment: You were taking 2.5mg of Eliquis (Apixaban) twice a day at home. We increased this to 5mg of Eliquis twice a day. The new dosage has been sent to your pharmacy. Please make note of this change.    Diagnosis: Anxiety  Assessment and Plan of Treatment: Continue clonazepam    Diagnosis: Depression  Assessment and Plan of Treatment: Please continue your Wellbutrin    Diagnosis: Seizure  Assessment and Plan of Treatment: Continue with Keppra    Diagnosis: Multiple sclerosis  Assessment and Plan of Treatment: Please continue to take your Lyrica and follow up with your primary care and neurologist.    Diagnosis: COVID-19  Assessment and Plan of Treatment: You were diagnosed with Coronavirus disease (COVID-19), an infectious disease caused by a newly discovered coronavirus.  Most people infected with the COVID-19 virus will experience mild to moderate respiratory illness and recover without requiring special treatment.  Older people, and those with underlying medical problems like cardiovascular disease, diabetes, chronic respiratory disease, and cancer are more likely to develop serious illness.  The best way to prevent and slow down transmission is to be well informed about the COVID-19 virus, the disease it causes and how it spreads. Protect yourself and others from infection by washing your hands or using an alcohol based rub frequently and not touching your face.   The COVID-19 virus spreads primarily through droplets of saliva or discharge from the nose when an infected person coughs or sneezes, so it’s important that you also practice respiratory etiquette (for example, by coughing into a flexed elbow).  If you were discharged with a pulse oximeter and/or oxygen, it is essential for your health and safety to use your medical devices as prescribed. Not doing so could result in hypoxemia which can be deadly within minutes. You should try to maintain your oxygen above 90% on room air.   If you experience worsening shortness of breath, increased respiratory rate and difficulty breathing, please report to the emergency department as soon as possible or call 911 for emergency medical services.  In the hospital we treated you with steroids and an antiviral medication called Remdesivir. You will not need to go home on either of these. You will also not need supplemental oxygen on discharge as your oxygen levels in the hospital were >91% on room air (breathing on your own).     PRINCIPAL DISCHARGE DIAGNOSIS  Diagnosis: UTI (urinary tract infection)  Assessment and Plan of Treatment: You had a urinary tract infection which grew Ecoli and Enterococcus faecalis in the urine. We gave you IV medications in the hospital.   We are sending you home on an antibiotic called amoxicillin 875mg twice a day. Please continue to take for 10 additional days.      SECONDARY DISCHARGE DIAGNOSES  Diagnosis: Neurogenic bladder  Assessment and Plan of Treatment: Continue Flomax and Oxybutynin    Diagnosis: DVT, lower extremity  Assessment and Plan of Treatment: You were taking 2.5mg of Eliquis (Apixaban) twice a day at home. We increased this to 5mg of Eliquis twice a day. The new dosage has been sent to your pharmacy. Please make note of this change.    Diagnosis: Anxiety  Assessment and Plan of Treatment: Continue clonazepam    Diagnosis: Depression  Assessment and Plan of Treatment: Please continue your Wellbutrin    Diagnosis: Seizure  Assessment and Plan of Treatment: Continue with Keppra    Diagnosis: Multiple sclerosis  Assessment and Plan of Treatment: Please continue to take your Lyrica and follow up with your primary care and neurologist.    Diagnosis: COVID-19  Assessment and Plan of Treatment: You were diagnosed with Coronavirus disease (COVID-19), an infectious disease caused by a newly discovered coronavirus.  Most people infected with the COVID-19 virus will experience mild to moderate respiratory illness and recover without requiring special treatment.  Older people, and those with underlying medical problems like cardiovascular disease, diabetes, chronic respiratory disease, and cancer are more likely to develop serious illness.  The best way to prevent and slow down transmission is to be well informed about the COVID-19 virus, the disease it causes and how it spreads. Protect yourself and others from infection by washing your hands or using an alcohol based rub frequently and not touching your face.   The COVID-19 virus spreads primarily through droplets of saliva or discharge from the nose when an infected person coughs or sneezes, so it’s important that you also practice respiratory etiquette (for example, by coughing into a flexed elbow).  If you were discharged with a pulse oximeter and/or oxygen, it is essential for your health and safety to use your medical devices as prescribed. Not doing so could result in hypoxemia which can be deadly within minutes. You should try to maintain your oxygen above 90% on room air.   If you experience worsening shortness of breath, increased respiratory rate and difficulty breathing, please report to the emergency department as soon as possible or call 911 for emergency medical services.  In the hospital we treated you with steroids and an antiviral medication called Remdesivir. You will not need to go home on either of these. You will also not need supplemental oxygen on discharge as your oxygen levels in the hospital were >91% on room air (breathing on your own).     PRINCIPAL DISCHARGE DIAGNOSIS  Diagnosis: UTI (urinary tract infection)  Assessment and Plan of Treatment: You had a urinary tract infection which grew Ecoli and Enterococcus faecalis in the urine. We gave you IV medications in the hospital.   We are sending you home on an antibiotic called amoxicillin 875mg twice a day. Please continue to take for 10 additional days.      SECONDARY DISCHARGE DIAGNOSES  Diagnosis: Neurogenic bladder  Assessment and Plan of Treatment: Continue Flomax and Oxybutynin    Diagnosis: DVT, lower extremity  Assessment and Plan of Treatment: You were taking 2.5mg of Eliquis (Apixaban) twice a day at home. We increased this to 5mg of Eliquis twice a day. The new dosage has been sent to your pharmacy. Please make note of this change.    Diagnosis: Anxiety  Assessment and Plan of Treatment: Continue clonazepam    Diagnosis: Depression  Assessment and Plan of Treatment: Please continue your Wellbutrin    Diagnosis: Seizure  Assessment and Plan of Treatment: Continue with Keppra    Diagnosis: Multiple sclerosis  Assessment and Plan of Treatment: Please continue to take your Lyrica and follow up with your primary care and neurologist.    Diagnosis: COVID-19  Assessment and Plan of Treatment: You were diagnosed with Coronavirus disease (COVID-19), an infectious disease caused by a newly discovered coronavirus.  Most people infected with the COVID-19 virus will experience mild to moderate respiratory illness and recover without requiring special treatment.  Older people, and those with underlying medical problems like cardiovascular disease, diabetes, chronic respiratory disease, and cancer are more likely to develop serious illness.  The best way to prevent and slow down transmission is to be well informed about the COVID-19 virus, the disease it causes and how it spreads. Protect yourself and others from infection by washing your hands or using an alcohol based rub frequently and not touching your face.   The COVID-19 virus spreads primarily through droplets of saliva or discharge from the nose when an infected person coughs or sneezes, so it’s important that you also practice respiratory etiquette (for example, by coughing into a flexed elbow).  If you were discharged with a pulse oximeter and/or oxygen, it is essential for your health and safety to use your medical devices as prescribed. Not doing so could result in hypoxemia which can be deadly within minutes. You should try to maintain your oxygen above 90% on room air. You can check your oxygen levels by using a pulse oximiter. When discharged from the hospital you can consider checking 2-3 times per day.   If you experience worsening shortness of breath, increased respiratory rate and difficulty breathing, please report to the emergency department as soon as possible or call 911 for emergency medical services.  In the hospital we treated you with steroids and an antiviral medication called Remdesivir. You will not need to go home on either of these. You will also not need supplemental oxygen on discharge as your oxygen levels in the hospital were >91% on room air (breathing on your own).

## 2020-12-29 NOTE — PROGRESS NOTE ADULT - ASSESSMENT
Ms. Chari Reilly is a 55F with a PMHx of multiple sclerosis (bedbound at baseline), intracranial hemorrhage, HTN, seizure disorder who presents with fever and AMS, likely sepsis 2/2 UTI.      Problem/Plan - 1:  ·  Problem: Sepsis, E coli and E fecalis.  Plan: Meeting SIRS criteria on admission with fever, tachycardia, tachypnea. UA grossly positive. Pt also with previous covid positive. No leukocytosis. Lactate 0.8. Procalcitonin 1.07. Received 1L in ED.  -Urine cx growing Enterococcus faecalis and E coli   -Blood culture NGTD  Plan:   -change ceftriaxone to ampicillin 2g q6H. Can change to amoxicillin on discharge   -F/u sensitivities of urine culture      Problem/Plan - 2:  ·  Problem: UTI (urinary tract infection).  Plan: Plan as above.   -Patient has had history of multiple UTIs given neurogenic bladder from MS  -Continue antibiotics as above.      Problem/Plan - 3:  ·  Problem: COVID-19.  Plan: Pt with previous positive COVID test. Reportedly hypoxic to 80s in the field. Currently denying cough or SOB. Pt SpO2 91-95% off O2 while off oxygen. Put on 2L NC. D-dimer wnl , CRP elevated  - given true hypoxia at bedside, though unclear whether driven by covid or an independent entity, will start remdesivir (12/27 - 12/31) and decadron (12/27 - 1/6)  -Currently patient is saturating well on RA 94%. Patient cannot walk, but sitting O2 is 94% RA.   - supplemental O2 as needed to maintain SpO2 >92%  - mISS while on decadron.      Problem/Plan - 4:  ·  Problem: MS (multiple sclerosis).  Plan: Bedbound at baseline. Complications include neurogenic bladder and frequent UTIs. Per med rec, patient is not on baclofen.   -Continue to monitor   -Continue Lyrica 150mg BID    #Neurogenic bladder   -Continue Flomax and Oxybutynin.      Problem/Plan - 5:  ·  Problem: Seizure.  Plan: VEEG on previous admission w/ seizure activity  - c/w Keppra 250mg BID (per most recent outpatient neuro notes)  - Neuro exam baseline - LE weakness>UE weakness, bedbound at baseline.      Problem/Plan - 6:  Problem: Depression, unspecified depression type. Plan: -Per medrec, patient on Wellbutrin 200mg per day.   -Will restart inpatient. Per outside records, neurologists aware of Keppra and Wellbutrin, no contraindication noted.     #Anxiety  Also on clonazepam 1.5mg qhs - unclear whether for anxiety or some other indication  Verified on I-stop (please see chart note from 12/28/2020  -Restarted 1.5mg qHS.     Problem/Plan - 7:  ·  Problem: Deep vein thrombosis (DVT) of non-extremity vein, unspecified chronicity.  Plan: Per notes from previous admission, had had a DVT of unknown extremity of unknown duration. Had been on eliquis 2.5mg BID per previous notes  - will start treatment with Eliquis 5mg BID   [ ] Need to obtain collateral regarding duration of Eliquis.      Problem/Plan - 8:  ·  Problem: Nutrition, metabolism, and development symptoms.  Plan: F: none at this time   E: Replete PRN, goal K>4, Mg>2  N: Regular, vegetarian  DVT PPx: Eliquis  FULL CODE   Ms. Chair Reilly is a 55F with a PMHx of multiple sclerosis (bedbound at baseline), intracranial hemorrhage, HTN, seizure disorder who presents with fever and AMS, likely sepsis 2/2 UTI.      Problem/Plan - 1:  ·  Problem: Sepsis, E coli and E fecalis.  Plan: Meeting SIRS criteria on admission with fever, tachycardia, tachypnea. UA grossly positive. Pt also with previous covid positive. No leukocytosis. Lactate 0.8. Procalcitonin 1.07. Received 1L in ED.  -Urine cx growing Enterococcus faecalis and E coli   -Blood culture NGTD  Plan:   -change ceftriaxone to ampicillin 2g q6H. Can change to amoxicillin on discharge   -F/u sensitivities of urine culture      Problem/Plan - 2:  ·  Problem: UTI (urinary tract infection).  Plan: Plan as above.   -Patient has had history of multiple UTIs given neurogenic bladder from MS  -Continue antibiotics as above.      Problem/Plan - 3:  ·  Problem: COVID-19.  Plan: Pt with previous positive COVID test. Reportedly hypoxic to 80s in the field. Currently denying cough or SOB. Pt SpO2 91-95% off O2 while off oxygen. Put on 2L NC. D-dimer wnl , CRP elevated  - given true hypoxia at bedside, though unclear whether driven by covid or an independent entity, will start remdesivir (12/27 - 12/31) and decadron (12/27 - 1/6)  -Currently patient is saturating well on RA 94%. Patient cannot walk, but sitting O2 is 94% RA.   - supplemental O2 as needed to maintain SpO2 >92%  - mISS while on decadron.      Problem/Plan - 4:  ·  Problem: MS (multiple sclerosis).  Plan: Bedbound at baseline. Complications include neurogenic bladder and frequent UTIs. Per med rec, patient is not on baclofen.   -Continue to monitor   -Continue Lyrica 150mg BID    #Neurogenic bladder   -Continue Flomax and Oxybutynin.      Problem/Plan - 5:  ·  Problem: Seizure.  Plan: VEEG on previous admission w/ seizure activity  - c/w Keppra 250mg BID (per most recent outpatient neuro notes)  - Neuro exam baseline - LE weakness>UE weakness, bedbound at baseline.      Problem/Plan - 6:  Problem: Depression, unspecified depression type. Plan: -Per medrec, patient on Wellbutrin 200mg per day.   -Will restart inpatient. Per outside records, neurologists aware of Keppra and Wellbutrin, no contraindication noted.     #Anxiety  Also on clonazepam 1.5mg qhs - unclear whether for anxiety or some other indication  Verified on I-stop (please see chart note from 12/28/2020  -Restarted 1.5mg qHS.     Problem/Plan - 7:  ·  Problem: Deep vein thrombosis (DVT) of non-extremity vein, unspecified chronicity.  Plan: Per notes from previous admission, had had a DVT of unknown extremity of unknown duration. Had been on eliquis 2.5mg BID per previous notes  - will start treatment with Eliquis 5mg BID   [ ] Need to obtain collateral regarding duration of Eliquis.      Problem/Plan - 8:  ·  Problem: Nutrition, metabolism, and development symptoms.  Plan: F: none at this time   E: Replete PRN, goal K>4, Mg>2  N: Regular, vegetarian  DVT PPx: Eliquis  FULL CODE

## 2020-12-29 NOTE — PROGRESS NOTE ADULT - PROBLEM SELECTOR PROBLEM 6
All questions were answered/No apparent complications or complaints reguarding anesthesia care at this time Depression, unspecified depression type

## 2020-12-29 NOTE — PROGRESS NOTE ADULT - ASSESSMENT
Ms. Chari Reilly is a 55F with a PMHx of multiple sclerosis (bedbound at baseline), intracranial hemorrhage, HTN, seizure disorder who presents with fever and AMS, likely sepsis 2/2 UTI.

## 2020-12-29 NOTE — PROGRESS NOTE ADULT - SUBJECTIVE AND OBJECTIVE BOX
Patient is a 55y old  Female who presents with a chief complaint of Lethargy, AMS (29 Dec 2020 07:38)    INTERVAL EVENTS:  No acute events overnight  SpO2 stable on room air.   Urine Cultures returned showing E coli and E fecalis both susceptible to ampicillin    SUBJECTIVE  Patient is eager to return home.   Spoke with patient,  and her brother Fransico over the phone. Answered questions about discharge planning and follow up.         MEDICATIONS:  MEDICATIONS  (STANDING):  ampicillin  IVPB 2 Gram(s) IV Intermittent every 6 hours  apixaban 5 milliGRAM(s) Oral every 12 hours  buPROPion  milliGRAM(s) Oral daily  clonazePAM  Tablet 1.5 milliGRAM(s) Oral at bedtime  dexAMETHasone     Tablet 6 milliGRAM(s) Oral daily  dextrose 40% Gel 15 Gram(s) Oral once  dextrose 5%. 1000 milliLiter(s) (50 mL/Hr) IV Continuous <Continuous>  dextrose 5%. 1000 milliLiter(s) (100 mL/Hr) IV Continuous <Continuous>  dextrose 50% Injectable 25 Gram(s) IV Push once  escitalopram 20 milliGRAM(s) Oral every 24 hours  famotidine    Tablet 20 milliGRAM(s) Oral daily  glucagon  Injectable 1 milliGRAM(s) IntraMuscular once  insulin lispro (ADMELOG) corrective regimen sliding scale   SubCutaneous Before meals and at bedtime  levETIRAcetam 250 milliGRAM(s) Oral two times a day  oxybutynin 5 milliGRAM(s) Oral two times a day  polyethylene glycol 3350 17 Gram(s) Oral every 24 hours  pregabalin 150 milliGRAM(s) Oral two times a day  remdesivir  IVPB   IV Intermittent   remdesivir  IVPB 100 milliGRAM(s) IV Intermittent every 24 hours  senna 2 Tablet(s) Oral at bedtime  tamsulosin 0.8 milliGRAM(s) Oral at bedtime    MEDICATIONS  (PRN):  ondansetron Injectable 4 milliGRAM(s) IV Push every 8 hours PRN Nausea and/or Vomiting      Allergies    No Known Allergies    Intolerances        OBJECTIVE:  Vital Signs Last 24 Hrs  T(C): 36.6 (29 Dec 2020 17:28), Max: 36.8 (29 Dec 2020 05:30)  T(F): 97.9 (29 Dec 2020 17:28), Max: 98.3 (29 Dec 2020 05:30)  HR: 89 (29 Dec 2020 08:30) (79 - 93)  BP: 138/81 (29 Dec 2020 08:30) (107/63 - 138/81)  BP(mean): --  RR: 18 (29 Dec 2020 08:30) (18 - 20)  SpO2: 91% (29 Dec 2020 08:30) (91% - 92%)  I&O's Summary    28 Dec 2020 07:01  -  29 Dec 2020 07:00  --------------------------------------------------------  IN: 0 mL / OUT: 600 mL / NET: -600 mL        PHYSICAL EXAM:  General: thin female in NAD  HEENT: NC/AT; PERRL, anicteric sclera; MMM,   Neck: supple  Cardiovascular: +S1/S2; RRR  Respiratory: Adequate respiratory effort, no increased work of breathing.   Gastrointestinal: soft, NT/ND;   Extremities: WWP; no edema or cyanosis  Vascular: 2+ radial, DP/PT pulses present B/L  Neurological: AAOx3. Can move bilateral UE but cannot move b/L LE (reported baseline, 2/2 MS)    LABS:                        12.8   4.06  )-----------( 332      ( 29 Dec 2020 09:32 )             40.4     12-29    143  |  107  |  15  ----------------------------<  116<H>  4.1   |  26  |  0.43<L>    Ca    8.2<L>      29 Dec 2020 09:32  Phos  2.8     12-29  Mg     2.4     12-29    TPro  5.6<L>  /  Alb  2.9<L>  /  TBili  0.2  /  DBili  x   /  AST  38  /  ALT  21  /  AlkPhos  73  12-29    LIVER FUNCTIONS - ( 29 Dec 2020 09:32 )  Alb: 2.9 g/dL / Pro: 5.6 g/dL / ALK PHOS: 73 U/L / ALT: 21 U/L / AST: 38 U/L / GGT: x             CAPILLARY BLOOD GLUCOSE      POCT Blood Glucose.: 95 mg/dL (29 Dec 2020 17:08)  POCT Blood Glucose.: 96 mg/dL (29 Dec 2020 11:52)  POCT Blood Glucose.: 108 mg/dL (29 Dec 2020 07:06)        MICRODATA:    Culture - Urine (collected 27 Dec 2020 15:15)  Source: .Urine Clean Catch (Midstream)  Final Report (29 Dec 2020 17:31):    >100,000 CFU/ml Escherichia coli    >100,000 CFU/ml Enterococcus faecalis **Please Note**: This is a    Corrected Report**    Corrected result: on Enterococcus faecalis: Ampicillin: susceptible    Previously reported as: On Enterococcus faecalis, Ampicillin: resistant    Result called to and read back byLetha Carson  12/29/2020 17:30:08  Organism: Escherichia coli  Enterococcus faecalis  Escherichia coli (29 Dec 2020 16:09)  Organism: Escherichia coli (29 Dec 2020 16:09)  Organism: Enterococcus faecalis (29 Dec 2020 16:09)  Organism: Escherichia coli (29 Dec 2020 16:09)    Culture - Blood (collected 27 Dec 2020 14:38)  Source: .Blood Blood-Peripheral  Preliminary Report (29 Dec 2020 15:00):    No growth at 2 days.    Culture - Blood (collected 27 Dec 2020 14:38)  Source: .Blood Blood-Peripheral  Preliminary Report (29 Dec 2020 15:00):    No growth at 2 days.        RADIOLOGY/OTHER STUDIES:

## 2020-12-29 NOTE — DISCHARGE NOTE PROVIDER - NSDCFUADDAPPT_GEN_ALL_CORE_FT
Discussed with PCP Dr. Stef Washington ([phone]) who will contact patient himself to schedule follow up appointment in 1-2 weeks.  Discussed with PCP Mr. Stef Washington ([phone]) who will contact patient himself to schedule follow up appointment in 1-2 weeks.

## 2020-12-29 NOTE — DISCHARGE NOTE NURSING/CASE MANAGEMENT/SOCIAL WORK - NSDCFUADDAPPT_GEN_ALL_CORE_FT
Discussed with PCP Mr. Stef Washington ([phone]) who will contact patient himself to schedule follow up appointment in 1-2 weeks.

## 2020-12-29 NOTE — PROGRESS NOTE ADULT - PROBLEM SELECTOR PLAN 8
F: none at this time   E: Replete PRN, goal K>4, Mg>2  N: Regular, vegetarian  DVT PPx: Eliquis  FULL CODE  Dispo: [facility]    Patient is stable for d/c. SW/DURGA will contact [facility] 12/30 for patient to go home.

## 2020-12-29 NOTE — PROGRESS NOTE ADULT - SUBJECTIVE AND OBJECTIVE BOX
OVERNIGHT EVENTS: ADRIENNE    SUBJECTIVE / INTERVAL HPI: Patient seen and examined at bedside. Feels well, denies any chest pain, N/V/D/constipation. Has no SOB or chest pain.     VITAL SIGNS:  Vital Signs Last 24 Hrs  T(C): 36.6 (29 Dec 2020 17:28), Max: 36.8 (29 Dec 2020 05:30)  T(F): 97.9 (29 Dec 2020 17:28), Max: 98.3 (29 Dec 2020 05:30)  HR: 94 (29 Dec 2020 16:40) (79 - 94)  BP: 135/82 (29 Dec 2020 16:40) (107/63 - 138/81)  BP(mean): --  RR: 20 (29 Dec 2020 16:40) (18 - 20)  SpO2: 94% (29 Dec 2020 16:40) (91% - 94%)    12-28-20 @ 07:01  -  12-29-20 @ 07:00  --------------------------------------------------------  IN: 0 mL / OUT: 600 mL / NET: -600 mL        PHYSICAL EXAM:    General: thin female in NAD  HEENT: NC/AT; PERRL, anicteric sclera; MMM, nasal cannula in place   Neck: supple  Cardiovascular: +S1/S2; RRR  Respiratory: CTA B/L; no W/R/R  Gastrointestinal: soft, NT/ND; +BSx4  Extremities: WWP; no edema, clubbing or cyanosis  Vascular: 2+ radial, DP/PT pulses B/L  Neurological: AAOx3. Can move bilateral UE but cannot move b/L LE 2/2 MS.     MEDICATIONS:  MEDICATIONS  (STANDING):  ampicillin  IVPB 2 Gram(s) IV Intermittent every 6 hours  apixaban 5 milliGRAM(s) Oral every 12 hours  buPROPion  milliGRAM(s) Oral daily  clonazePAM  Tablet 1.5 milliGRAM(s) Oral at bedtime  dexAMETHasone     Tablet 6 milliGRAM(s) Oral daily  dextrose 40% Gel 15 Gram(s) Oral once  dextrose 5%. 1000 milliLiter(s) (50 mL/Hr) IV Continuous <Continuous>  dextrose 5%. 1000 milliLiter(s) (100 mL/Hr) IV Continuous <Continuous>  dextrose 50% Injectable 25 Gram(s) IV Push once  escitalopram 20 milliGRAM(s) Oral every 24 hours  famotidine    Tablet 20 milliGRAM(s) Oral daily  glucagon  Injectable 1 milliGRAM(s) IntraMuscular once  insulin lispro (ADMELOG) corrective regimen sliding scale   SubCutaneous Before meals and at bedtime  levETIRAcetam 250 milliGRAM(s) Oral two times a day  oxybutynin 5 milliGRAM(s) Oral two times a day  polyethylene glycol 3350 17 Gram(s) Oral every 24 hours  pregabalin 150 milliGRAM(s) Oral two times a day  remdesivir  IVPB   IV Intermittent   remdesivir  IVPB 100 milliGRAM(s) IV Intermittent every 24 hours  senna 2 Tablet(s) Oral at bedtime  tamsulosin 0.8 milliGRAM(s) Oral at bedtime    MEDICATIONS  (PRN):  ondansetron Injectable 4 milliGRAM(s) IV Push every 8 hours PRN Nausea and/or Vomiting      ALLERGIES:  Allergies    No Known Allergies    Intolerances        LABS:                        12.8   4.06  )-----------( 332      ( 29 Dec 2020 09:32 )             40.4     12-29    143  |  107  |  15  ----------------------------<  116<H>  4.1   |  26  |  0.43<L>    Ca    8.2<L>      29 Dec 2020 09:32  Phos  2.8     12-29  Mg     2.4     12-29    TPro  5.6<L>  /  Alb  2.9<L>  /  TBili  0.2  /  DBili  x   /  AST  38  /  ALT  21  /  AlkPhos  73  12-29        CAPILLARY BLOOD GLUCOSE      POCT Blood Glucose.: 95 mg/dL (29 Dec 2020 17:08)        RADIOLOGY & ADDITIONAL TESTS: Reviewed.

## 2020-12-29 NOTE — PROGRESS NOTE ADULT - PROBLEM SELECTOR PLAN 7
Per notes from previous admission, had had a DVT of unknown extremity of unknown duration. Had been on eliquis 2.5mg BID per previous notes  - will start treatment with Eliquis 5mg BID

## 2020-12-29 NOTE — PROGRESS NOTE ADULT - PROBLEM SELECTOR PLAN 2
Plan as above.   -Patient has had history of multiple UTIs given neurogenic bladder from MS  -Continue antibiotics as above

## 2020-12-30 VITALS
HEART RATE: 94 BPM | DIASTOLIC BLOOD PRESSURE: 78 MMHG | SYSTOLIC BLOOD PRESSURE: 129 MMHG | TEMPERATURE: 99 F | OXYGEN SATURATION: 93 % | RESPIRATION RATE: 17 BRPM

## 2020-12-30 PROBLEM — U07.1 COVID-19: Chronic | Status: ACTIVE | Noted: 2020-12-27

## 2020-12-30 LAB
GLUCOSE BLDC GLUCOMTR-MCNC: 102 MG/DL — HIGH (ref 70–99)
GLUCOSE BLDC GLUCOMTR-MCNC: 96 MG/DL — SIGNIFICANT CHANGE UP (ref 70–99)

## 2020-12-30 PROCEDURE — 99233 SBSQ HOSP IP/OBS HIGH 50: CPT | Mod: CS,GC

## 2020-12-30 RX ADMIN — FAMOTIDINE 20 MILLIGRAM(S): 10 INJECTION INTRAVENOUS at 13:16

## 2020-12-30 RX ADMIN — Medication 216 GRAM(S): at 10:40

## 2020-12-30 RX ADMIN — Medication 6 MILLIGRAM(S): at 06:40

## 2020-12-30 RX ADMIN — ESCITALOPRAM OXALATE 20 MILLIGRAM(S): 10 TABLET, FILM COATED ORAL at 13:14

## 2020-12-30 RX ADMIN — Medication 5 MILLIGRAM(S): at 06:40

## 2020-12-30 RX ADMIN — APIXABAN 5 MILLIGRAM(S): 2.5 TABLET, FILM COATED ORAL at 06:40

## 2020-12-30 RX ADMIN — Medication 216 GRAM(S): at 02:46

## 2020-12-30 RX ADMIN — BUPROPION HYDROCHLORIDE 200 MILLIGRAM(S): 150 TABLET, EXTENDED RELEASE ORAL at 13:18

## 2020-12-30 RX ADMIN — Medication 150 MILLIGRAM(S): at 06:40

## 2020-12-30 RX ADMIN — LEVETIRACETAM 250 MILLIGRAM(S): 250 TABLET, FILM COATED ORAL at 06:40

## 2020-12-30 NOTE — PROGRESS NOTE ADULT - SUBJECTIVE AND OBJECTIVE BOX
OVERNIGHT EVENTS: ARDIENNE    SUBJECTIVE / INTERVAL HPI: Patient seen and examined at bedside. Feels well, denies any chest pain, N/V/D/constipation. Has no SOB or chest pain.     VITAL SIGNS:  Vital Signs Last 24 Hrs  T(C): 36.6 (29 Dec 2020 17:28), Max: 36.8 (29 Dec 2020 05:30)  T(F): 97.9 (29 Dec 2020 17:28), Max: 98.3 (29 Dec 2020 05:30)  HR: 94 (29 Dec 2020 16:40) (79 - 94)  BP: 135/82 (29 Dec 2020 16:40) (107/63 - 138/81)  BP(mean): --  RR: 20 (29 Dec 2020 16:40) (18 - 20)  SpO2: 94% (29 Dec 2020 16:40) (91% - 94%)    12-28-20 @ 07:01  -  12-29-20 @ 07:00  --------------------------------------------------------  IN: 0 mL / OUT: 600 mL / NET: -600 mL        PHYSICAL EXAM:    General: thin female in NAD  HEENT: NC/AT; PERRL, anicteric sclera; MMM, nasal cannula in place   Neck: supple  Cardiovascular: +S1/S2; RRR  Respiratory: CTA B/L; no W/R/R  Gastrointestinal: soft, NT/ND; +BSx4  Extremities: WWP; no edema, clubbing or cyanosis  Vascular: 2+ radial, DP/PT pulses B/L  Neurological: AAOx3. Can move bilateral UE but cannot move b/L LE 2/2 MS.     MEDICATIONS:  MEDICATIONS  (STANDING):  ampicillin  IVPB 2 Gram(s) IV Intermittent every 6 hours  apixaban 5 milliGRAM(s) Oral every 12 hours  buPROPion  milliGRAM(s) Oral daily  clonazePAM  Tablet 1.5 milliGRAM(s) Oral at bedtime  dexAMETHasone     Tablet 6 milliGRAM(s) Oral daily  dextrose 40% Gel 15 Gram(s) Oral once  dextrose 5%. 1000 milliLiter(s) (50 mL/Hr) IV Continuous <Continuous>  dextrose 5%. 1000 milliLiter(s) (100 mL/Hr) IV Continuous <Continuous>  dextrose 50% Injectable 25 Gram(s) IV Push once  escitalopram 20 milliGRAM(s) Oral every 24 hours  famotidine    Tablet 20 milliGRAM(s) Oral daily  glucagon  Injectable 1 milliGRAM(s) IntraMuscular once  insulin lispro (ADMELOG) corrective regimen sliding scale   SubCutaneous Before meals and at bedtime  levETIRAcetam 250 milliGRAM(s) Oral two times a day  oxybutynin 5 milliGRAM(s) Oral two times a day  polyethylene glycol 3350 17 Gram(s) Oral every 24 hours  pregabalin 150 milliGRAM(s) Oral two times a day  remdesivir  IVPB   IV Intermittent   remdesivir  IVPB 100 milliGRAM(s) IV Intermittent every 24 hours  senna 2 Tablet(s) Oral at bedtime  tamsulosin 0.8 milliGRAM(s) Oral at bedtime    MEDICATIONS  (PRN):  ondansetron Injectable 4 milliGRAM(s) IV Push every 8 hours PRN Nausea and/or Vomiting      ALLERGIES:  Allergies    No Known Allergies    Intolerances        LABS:                        12.8   4.06  )-----------( 332      ( 29 Dec 2020 09:32 )             40.4     12-29    143  |  107  |  15  ----------------------------<  116<H>  4.1   |  26  |  0.43<L>    Ca    8.2<L>      29 Dec 2020 09:32  Phos  2.8     12-29  Mg     2.4     12-29    TPro  5.6<L>  /  Alb  2.9<L>  /  TBili  0.2  /  DBili  x   /  AST  38  /  ALT  21  /  AlkPhos  73  12-29        CAPILLARY BLOOD GLUCOSE      POCT Blood Glucose.: 95 mg/dL (29 Dec 2020 17:08)        RADIOLOGY & ADDITIONAL TESTS: Reviewed.

## 2020-12-30 NOTE — PROGRESS NOTE ADULT - PROBLEM SELECTOR PLAN 8
F: none at this time   E: Replete PRN, goal K>4, Mg>2  N: Regular, vegetarian  DVT PPx: Eliquis  FULL CODE  Dispo: [facility]    Patient is stable for d/c. SW/DURGA will contact [facility] 12/30 for patient to go home.

## 2020-12-31 ENCOUNTER — TRANSCRIPTION ENCOUNTER (OUTPATIENT)
Age: 55
End: 2020-12-31

## 2021-01-11 DIAGNOSIS — G35 MULTIPLE SCLEROSIS: ICD-10-CM

## 2021-01-11 DIAGNOSIS — F41.9 ANXIETY DISORDER, UNSPECIFIED: ICD-10-CM

## 2021-01-11 DIAGNOSIS — F32.9 MAJOR DEPRESSIVE DISORDER, SINGLE EPISODE, UNSPECIFIED: ICD-10-CM

## 2021-01-11 DIAGNOSIS — I10 ESSENTIAL (PRIMARY) HYPERTENSION: ICD-10-CM

## 2021-01-11 DIAGNOSIS — B96.20 UNSPECIFIED ESCHERICHIA COLI [E. COLI] AS THE CAUSE OF DISEASES CLASSIFIED ELSEWHERE: ICD-10-CM

## 2021-01-11 DIAGNOSIS — A41.89 OTHER SPECIFIED SEPSIS: ICD-10-CM

## 2021-01-11 DIAGNOSIS — N39.0 URINARY TRACT INFECTION, SITE NOT SPECIFIED: ICD-10-CM

## 2021-01-11 DIAGNOSIS — G93.41 METABOLIC ENCEPHALOPATHY: ICD-10-CM

## 2021-01-11 DIAGNOSIS — N31.9 NEUROMUSCULAR DYSFUNCTION OF BLADDER, UNSPECIFIED: ICD-10-CM

## 2021-01-11 DIAGNOSIS — I82.409 ACUTE EMBOLISM AND THROMBOSIS OF UNSPECIFIED DEEP VEINS OF UNSPECIFIED LOWER EXTREMITY: ICD-10-CM

## 2021-01-11 DIAGNOSIS — B95.2 ENTEROCOCCUS AS THE CAUSE OF DISEASES CLASSIFIED ELSEWHERE: ICD-10-CM

## 2021-01-11 DIAGNOSIS — G40.909 EPILEPSY, UNSPECIFIED, NOT INTRACTABLE, WITHOUT STATUS EPILEPTICUS: ICD-10-CM

## 2021-01-11 DIAGNOSIS — U07.1 COVID-19: ICD-10-CM

## 2021-02-03 ENCOUNTER — APPOINTMENT (OUTPATIENT)
Dept: NEUROLOGY | Facility: CLINIC | Age: 56
End: 2021-02-03

## 2021-02-09 ENCOUNTER — APPOINTMENT (OUTPATIENT)
Dept: NEUROLOGY | Facility: CLINIC | Age: 56
End: 2021-02-09

## 2021-06-03 PROCEDURE — 96375 TX/PRO/DX INJ NEW DRUG ADDON: CPT

## 2021-06-03 PROCEDURE — 84145 PROCALCITONIN (PCT): CPT

## 2021-06-03 PROCEDURE — 71045 X-RAY EXAM CHEST 1 VIEW: CPT

## 2021-06-03 PROCEDURE — 80307 DRUG TEST PRSMV CHEM ANLYZR: CPT

## 2021-06-03 PROCEDURE — 84132 ASSAY OF SERUM POTASSIUM: CPT

## 2021-06-03 PROCEDURE — 86140 C-REACTIVE PROTEIN: CPT

## 2021-06-03 PROCEDURE — 84295 ASSAY OF SERUM SODIUM: CPT

## 2021-06-03 PROCEDURE — 85384 FIBRINOGEN ACTIVITY: CPT

## 2021-06-03 PROCEDURE — 82550 ASSAY OF CK (CPK): CPT

## 2021-06-03 PROCEDURE — 83036 HEMOGLOBIN GLYCOSYLATED A1C: CPT

## 2021-06-03 PROCEDURE — 85379 FIBRIN DEGRADATION QUANT: CPT

## 2021-06-03 PROCEDURE — 85730 THROMBOPLASTIN TIME PARTIAL: CPT

## 2021-06-03 PROCEDURE — 83605 ASSAY OF LACTIC ACID: CPT

## 2021-06-03 PROCEDURE — 81001 URINALYSIS AUTO W/SCOPE: CPT

## 2021-06-03 PROCEDURE — 82728 ASSAY OF FERRITIN: CPT

## 2021-06-03 PROCEDURE — 87086 URINE CULTURE/COLONY COUNT: CPT

## 2021-06-03 PROCEDURE — 82330 ASSAY OF CALCIUM: CPT

## 2021-06-03 PROCEDURE — 87186 SC STD MICRODIL/AGAR DIL: CPT

## 2021-06-03 PROCEDURE — 83880 ASSAY OF NATRIURETIC PEPTIDE: CPT

## 2021-06-03 PROCEDURE — 84484 ASSAY OF TROPONIN QUANT: CPT

## 2021-06-03 PROCEDURE — 83615 LACTATE (LD) (LDH) ENZYME: CPT

## 2021-06-03 PROCEDURE — 87635 SARS-COV-2 COVID-19 AMP PRB: CPT

## 2021-06-03 PROCEDURE — 99285 EMERGENCY DEPT VISIT HI MDM: CPT | Mod: 25

## 2021-06-03 PROCEDURE — 82803 BLOOD GASES ANY COMBINATION: CPT

## 2021-06-03 PROCEDURE — 96365 THER/PROPH/DIAG IV INF INIT: CPT

## 2021-06-03 PROCEDURE — 87040 BLOOD CULTURE FOR BACTERIA: CPT

## 2021-06-03 PROCEDURE — 85025 COMPLETE CBC W/AUTO DIFF WBC: CPT

## 2021-06-03 PROCEDURE — 85610 PROTHROMBIN TIME: CPT

## 2021-06-03 PROCEDURE — 84100 ASSAY OF PHOSPHORUS: CPT

## 2021-06-03 PROCEDURE — 82962 GLUCOSE BLOOD TEST: CPT

## 2021-06-03 PROCEDURE — 83735 ASSAY OF MAGNESIUM: CPT

## 2021-06-03 PROCEDURE — U0003: CPT

## 2021-06-03 PROCEDURE — 36415 COLL VENOUS BLD VENIPUNCTURE: CPT

## 2021-06-03 PROCEDURE — 93005 ELECTROCARDIOGRAM TRACING: CPT

## 2021-06-03 PROCEDURE — 80053 COMPREHEN METABOLIC PANEL: CPT

## 2023-07-27 NOTE — PROGRESS NOTE ADULT - ASSESSMENT
Ms. Reilly is a 56 yo F with PMH of MS, previous ICH, and toxin induced coma previously who presents with coma, CT without new acute findings but notable for significant gliosis and encephalomalacia; given stated history of seizure/coma in setting of cannabis use and otherwise isolated lactic acidosis clinical suspicion for seizure is high. UTI noted may have precipitated seizure.      #r/o Coma vs seizure. Previously was on propofol and intubated. No longer intubated and not on propofol. A&Ox2 this morning. Per step brother she has waxing and waning cognition at baseline.   - vEEG showing seizure activity  - epilepsy and gen neurology following, recs appreciated   c/w keppra 500mg BID      #HTN.   - Pt slightly hypertensive and tachycardic today   - restarted home verapamil        # Partial Ileus or SBO. As patient has very dilated loops of bowel and small bowel with fluid within it. However pt did have vomitus and BMs in ED. Pt also had BM this morning. NGT was never placed but pt seems to be tolerating well without it.   - Speech and swallow consult for restarting diet, rec mechanical soft with thin liquids  - GI, bariatric surgery, and surgery consulted-NTD, no need for colonoscopy      # Spastic bladder  - No acute issues. Is urinating fine.   - dover removed, Pt w/ bladder scan 340cc, urinary retention agents restarted day prior. s/p straight cath/  - q8 bladder scans  - c/w urinary retention medications     # MS  - holding home antidepressants while patient is NPO. Can restart once evaluated by speech and swallow.  - c/w welbutrin      # UTI 2/2 EColi  In spite of lactic acidosis only meets 1/4 SIRS so not consistent with sepsis. Note that leukocytosis poss  after patient received Solu-medrol in ED, not secondary to infection.  - CTX 2 g q24h (11/7 - ) for EColi treatment      # Chronic anticoagulation  - was previously on eliquis 2.5 bid for DVT in the past, but per step brother she had dopplers last month that showed no DVT present.  - c/w eliquis 2.5 BID      -IVF: s/p 3L NS in ED. Currently not on fluids.  -Monitor, Replete to K>4 and Mg>2  -Diet: NPO, speech and swallow consult.  PROPHYLAXIS  -DVT: Eliquis 2.5  -GI: none     Otolaryngologist Procedure Text (A): After obtaining clear surgical margins the patient was sent to otolaryngology for surgical repair.  The patient understands they will receive post-surgical care and follow-up from the referring physician's office.

## 2023-10-03 NOTE — ED PROVIDER NOTE - OBJECTIVE STATEMENT
54 y/o F, PMHx of MS, as per EMS patient bedbound, 24h home health aide arrived in afternoon and patient was unresponsive. As this was a new aide she did not know pts medical problems or medications and did not come to hospital. Unable to contribute to hx.   Medications: Eliqiuis 2.5, Clonazepam, Trazedone,  Escitalopram, bupropion, verapamil, famotidine, florastor, Acyclovir, pregabalin, probiomed, oxybutynin, tamsulosin, baclofen. 56 y/o F, PM/Hx of MS, as per EMS patient bedbound, 24h home health aide arrived in afternoon and patient was unresponsive. As this was a new aide she did not know pt's medical problems or medications and did not come to hospital. Unable to contribute to hx.   Medications: Eliquis 2.5, Clonazepam, Trazadone,  Escitalopram, bupropion, verapamil, famotidine, Florastor, Acyclovir, pregabalin, Pro-biomed, oxybutynin, tamsulosin, baclofen. Warm

## 2024-02-16 NOTE — ED ADULT NURSE NOTE - NSIMPLEMENTINTERV_GEN_ALL_ED
Implemented All Fall Risk Interventions:  [place] to call system. Call bell, personal items and telephone within reach. Instruct patient to call for assistance. Room bathroom lighting operational. Non-slip footwear when patient is off stretcher. Physically safe environment: no spills, clutter or unnecessary equipment. Stretcher in lowest position, wheels locked, appropriate side rails in place. Provide visual cue, wrist band, yellow gown, etc. Monitor gait and stability. Monitor for mental status changes and reorient to person, place, and time. Review medications for side effects contributing to fall risk. Reinforce activity limits and safety measures with patient and family. Orthopedic

## 2025-02-19 NOTE — ED ADULT TRIAGE NOTE - HEIGHT IN INCHES
4
As per MOC and staff. Pt able to static stand this am for weight. Requires hand held assistance to ambulate short distances however limited due to nauseau and medications.